# Patient Record
Sex: FEMALE | Race: WHITE | NOT HISPANIC OR LATINO | Employment: UNEMPLOYED | URBAN - METROPOLITAN AREA
[De-identification: names, ages, dates, MRNs, and addresses within clinical notes are randomized per-mention and may not be internally consistent; named-entity substitution may affect disease eponyms.]

---

## 2019-10-03 ENCOUNTER — TRANSFERRED RECORDS (OUTPATIENT)
Dept: HEALTH INFORMATION MANAGEMENT | Facility: CLINIC | Age: 29
End: 2019-10-03

## 2019-10-08 ENCOUNTER — TRANSFERRED RECORDS (OUTPATIENT)
Dept: HEALTH INFORMATION MANAGEMENT | Facility: CLINIC | Age: 29
End: 2019-10-08

## 2019-10-09 ENCOUNTER — TRANSFERRED RECORDS (OUTPATIENT)
Dept: HEALTH INFORMATION MANAGEMENT | Facility: CLINIC | Age: 29
End: 2019-10-09

## 2019-10-11 ENCOUNTER — TELEPHONE (OUTPATIENT)
Dept: DERMATOLOGY | Facility: CLINIC | Age: 29
End: 2019-10-11

## 2019-10-11 NOTE — TELEPHONE ENCOUNTER
[facility] Call Center    Phone Message    May a detailed message be left on voicemail: yes    Reason for Call: Other: Pt being referred by [facility] for Hidradenitis Suppurativa By Dr. Jared Ceron. Will fax over the referral No records attached with referral.      Action Taken: Message routed to:  [facility]

## 2019-10-22 NOTE — TELEPHONE ENCOUNTER
FUTURE VISIT INFORMATION      FUTURE VISIT INFORMATION:    Date: 10.25.19    Time: 8:00 am    Location:  Derm  REFERRAL INFORMATION:    Referring provider:  Dr. Jared Ceron    Referring providers clinic:  [facility]    Reason for visit/diagnosis:  new HS    RECORDS REQUESTED FROM:       Clinic name Comments Records Status Imaging Status   [facility] Requested                                       Action Deborah Khalil 10.22.19 3:45 pm   Action Taken Faxed records request to [facility]     Action Deborah Khalil 10.24.19 2:55 pm   Action Taken Called [facility] as records have not yet been received. They said they would fax records yet today. I let Dr. Mix know that records may not arrive in time.

## 2019-10-25 ENCOUNTER — OFFICE VISIT (OUTPATIENT)
Dept: DERMATOLOGY | Facility: CLINIC | Age: 29
End: 2019-10-25
Payer: COMMERCIAL

## 2019-10-25 ENCOUNTER — PRE VISIT (OUTPATIENT)
Dept: DERMATOLOGY | Facility: CLINIC | Age: 29
End: 2019-10-25

## 2019-10-25 VITALS — DIASTOLIC BLOOD PRESSURE: 82 MMHG | SYSTOLIC BLOOD PRESSURE: 139 MMHG | HEART RATE: 81 BPM

## 2019-10-25 DIAGNOSIS — L73.2 HIDRADENITIS SUPPURATIVA: Primary | ICD-10-CM

## 2019-10-25 DIAGNOSIS — L70.0 ACNE VULGARIS: ICD-10-CM

## 2019-10-25 RX ORDER — TRETINOIN 0.25 MG/G
CREAM TOPICAL AT BEDTIME
Qty: 45 G | Refills: 3 | Status: SHIPPED | OUTPATIENT
Start: 2019-10-25 | End: 2023-05-16

## 2019-10-25 RX ORDER — CLINDAMYCIN PHOSPHATE 10 UG/ML
LOTION TOPICAL 2 TIMES DAILY
Qty: 60 ML | Refills: 3 | Status: SHIPPED | OUTPATIENT
Start: 2019-10-25 | End: 2023-05-16

## 2019-10-25 RX ORDER — CLINDAMYCIN HCL 300 MG
300 CAPSULE ORAL 2 TIMES DAILY
Qty: 60 CAPSULE | Refills: 3 | Status: SHIPPED | OUTPATIENT
Start: 2019-10-25 | End: 2023-05-16

## 2019-10-25 ASSESSMENT — PAIN SCALES - GENERAL: PAINLEVEL: NO PAIN (0)

## 2019-10-25 NOTE — LETTER
10/25/2019       RE: Toma Bello  [address]  [place] [address]     Dear Colleague,    Thank you for referring your patient, Toma Bello, to the [facility] DERMATOLOGY at [facility]. Please see a copy of my visit note below.    [facility] Dermatology Note      Dermatology Problem List:  1. Urias stage I/II hidradenitis suppurativa - Urias II in the groin, Urias I elsewhere (abdominal panniculus, axillae)   - patient will not use contraceptives for Mandaen reasons   - start clindamycin topical, clindamycin 300 mg BID, chlorhexidine   - until becomes sexually active, can use tretinoin 0.025% cream, but must stop once becomes sexually active    Encounter Date: Oct 25, 2019    CC:  Chief Complaint   Patient presents with     Derm Problem     Toma is here today for consult for HS          History of Present Illness:  Ms. Toma Bello is a 28 year old female who presents in consultation from Dr. Ceron at [facility] for treatment evaluation her Hidradenitis Suppurativa (HS). Ms. Bello was diagnosed with HS in 2008. She has previously been on birth control; however, had multiple negative side effects. When she was in college at the [employer], she was on cephalexin, spironolactone, and zinc, which was helpful in controlling her symptoms and flares. She moved to [place] and was switched from cephalexin to minocycline. She has been adequately controlled on minocycline as well. Ms. Bello would like to know what other treatment options are available to her as she is getting  in April 2020 and is planning on getting pregnant soon. She will not use contraceptives for Mandaen reasons. She knows that she cannot be on minocycline when pregnant. She states that her HS is primarily in the genital area, although she has had minor flares in the axillae and breast previously. She has never had any cosmetic management of her HS in  the groin area. She would like to know what options are available to her for black head control prior to getting .     Past Medical History:   Patient Active Problem List   Diagnosis     Hidradenitis suppurativa     History reviewed. No pertinent past medical history.  History reviewed. No pertinent surgical history.    Social History:  Patient reports that she has never smoked. She has never used smokeless tobacco.    Family History:  History reviewed. No pertinent family history.    Medications:  Current Outpatient Medications   Medication Sig Dispense Refill     chlorhexidine (HIBICLENS) 4 % liquid Apply topically daily 473 mL 11     clindamycin (CLEOCIN T) 1 % external lotion Apply topically 2 times daily 60 mL 3     clindamycin (CLEOCIN) 300 MG capsule Take 1 capsule (300 mg) by mouth 2 times daily 60 capsule 3     tretinoin (RETIN-A) 0.025 % external cream Apply topically At Bedtime 45 g 3     methylPREDNISolone (MEDROL DOSEPAK) 4 MG tablet Follow package instructions (Patient not taking: Reported on 10/25/2019) 21 tablet 0     No Known Allergies      Review of Systems:  -As per HPI  -Constitutional: Otherwise feeling well today, in usual state of health.  -HEENT: Patient denies nonhealing oral sores.  -Skin: As above in HPI. No additional skin concerns.    Physical exam:  Vitals: /82 (BP Location: Right arm, Patient Position: Sitting, Cuff Size: Adult Regular)   Pulse 81   GEN: This is a well developed, well-nourished female in no acute distress, in a pleasant mood.    SKIN: Exam of the face, neck, chest, abdomen, back, buttocks, upper extremities, lower extremities, scalp, axillae, genitalia, and groin  -Boyd skin type: II  -3-4 firm, hyperpigmented erythematous papulonodules primarily on the labia majora/mons  -4-5 1 cm hyperpigmented scars on the lower abdomen  -double-headed comedones on the abdominal panniculus, axillae, groin  -No other lesions of concern on areas examined.      Impression/Plan:  1. Hidradenitis Suppurativa - Urias II in the groin, Urias I elsewhere (abdominal panniculus, axillae). Management must be adapted to lack of contraception, as most treatments are not safe to use during pregnancy. We spent quite a bit of time discussing numerous topical/intralesional, systemic, and surgical therapeutic options, including risks/benefits/alternatives to all of these various treatments.    Start oral clindamycin 300 mg BID    Apply clindamycin 1% external lotion BID.     Use topical chlorhexidine 4% liquid    Apply tretinoin 0.025% external cream for black heads. Told to stop using when sexually active and not on birth control    Referral to Dr. Sofía Alfred for consultation regarding laser hair removal for blackhead treatment.    Follow-up in 3 months, earlier for new or changing lesions.     Staff Involved:  I, Stacey Reynoso MS3, saw and examined the patient in the presence of Dr. Mix.    Staff Physician:  I was present with the medical student who participated in the service and in the documentation of the note. I have verified the history and personally performed the physical exam and medical decision making. I edited the assessment and plan of care as documented in the note.     Armando Mix MD   of Dermatology  Department of Dermatology  [facility] [employer]             Again, thank you for allowing me to participate in the care of your patient.      Sincerely,    Armando Mix MD

## 2019-10-25 NOTE — PROGRESS NOTES
[facility] Dermatology Note      Dermatology Problem List:  1. Urias stage I/II hidradenitis suppurativa - Urias II in the groin, Urias I elsewhere (abdominal panniculus, axillae)   - patient will not use contraceptives for Confucianism reasons   - start clindamycin topical, clindamycin 300 mg BID, chlorhexidine   - until becomes sexually active, can use tretinoin 0.025% cream, but must stop once becomes sexually active    Encounter Date: Oct 25, 2019    CC:  Chief Complaint   Patient presents with     Derm Problem     Toma is here today for consult for HS          History of Present Illness:  Ms. Toma Bello is a 28 year old female who presents in consultation from Dr. Ceron at [facility] for treatment evaluation her Hidradenitis Suppurativa (HS). Ms. Bello was diagnosed with HS in 2008. She has previously been on birth control; however, had multiple negative side effects. When she was in college at the [employer], she was on cephalexin, spironolactone, and zinc, which was helpful in controlling her symptoms and flares. She moved to [place] and was switched from cephalexin to minocycline. She has been adequately controlled on minocycline as well. Ms. Bello would like to know what other treatment options are available to her as she is getting  in April 2020 and is planning on getting pregnant soon. She will not use contraceptives for Confucianism reasons. She knows that she cannot be on minocycline when pregnant. She states that her HS is primarily in the genital area, although she has had minor flares in the axillae and breast previously. She has never had any cosmetic management of her HS in the groin area. She would like to know what options are available to her for black head control prior to getting .     Past Medical History:   Patient Active Problem List   Diagnosis     Hidradenitis suppurativa     History reviewed. No pertinent past medical  history.  History reviewed. No pertinent surgical history.    Social History:  Patient reports that she has never smoked. She has never used smokeless tobacco.    Family History:  History reviewed. No pertinent family history.    Medications:  Current Outpatient Medications   Medication Sig Dispense Refill     chlorhexidine (HIBICLENS) 4 % liquid Apply topically daily 473 mL 11     clindamycin (CLEOCIN T) 1 % external lotion Apply topically 2 times daily 60 mL 3     clindamycin (CLEOCIN) 300 MG capsule Take 1 capsule (300 mg) by mouth 2 times daily 60 capsule 3     tretinoin (RETIN-A) 0.025 % external cream Apply topically At Bedtime 45 g 3     methylPREDNISolone (MEDROL DOSEPAK) 4 MG tablet Follow package instructions (Patient not taking: Reported on 10/25/2019) 21 tablet 0     No Known Allergies      Review of Systems:  -As per HPI  -Constitutional: Otherwise feeling well today, in usual state of health.  -HEENT: Patient denies nonhealing oral sores.  -Skin: As above in HPI. No additional skin concerns.    Physical exam:  Vitals: /82 (BP Location: Right arm, Patient Position: Sitting, Cuff Size: Adult Regular)   Pulse 81   GEN: This is a well developed, well-nourished female in no acute distress, in a pleasant mood.    SKIN: Exam of the face, neck, chest, abdomen, back, buttocks, upper extremities, lower extremities, scalp, axillae, genitalia, and groin  -Boyd skin type: II  -3-4 firm, hyperpigmented erythematous papulonodules primarily on the labia majora/mons  -4-5 1 cm hyperpigmented scars on the lower abdomen  -double-headed comedones on the abdominal panniculus, axillae, groin  -No other lesions of concern on areas examined.     Impression/Plan:  1. Hidradenitis Suppurativa - Urias II in the groin, Urias I elsewhere (abdominal panniculus, axillae). Management must be adapted to lack of contraception, as most treatments are not safe to use during pregnancy. We spent quite a bit of time  discussing numerous topical/intralesional, systemic, and surgical therapeutic options, including risks/benefits/alternatives to all of these various treatments.    Start oral clindamycin 300 mg BID    Apply clindamycin 1% external lotion BID.     Use topical chlorhexidine 4% liquid    Apply tretinoin 0.025% external cream for black heads. Told to stop using when sexually active and not on birth control    Referral to Dr. Sofía Alfred for consultation regarding laser hair removal for blackhead treatment.    Follow-up in 3 months, earlier for new or changing lesions.     Staff Involved:  I, Stacey Reynoso MS3, saw and examined the patient in the presence of Dr. Mix.    Staff Physician:  I was present with the medical student who participated in the service and in the documentation of the note. I have verified the history and personally performed the physical exam and medical decision making. I edited the assessment and plan of care as documented in the note.     Armando Mix MD   of Dermatology  Department of Dermatology  [facility]

## 2019-10-25 NOTE — NURSING NOTE
Chief Complaint   Patient presents with     Derm Problem     Toma is here today for consult for HS      Kenia Vasquez LPN

## 2020-01-31 NOTE — LETTER
Date:October 28, 2019      Patient was self referred, no letter generated. Do not send.        [facility] Health Information       05-Feb-2020

## 2020-03-10 ENCOUNTER — HEALTH MAINTENANCE LETTER (OUTPATIENT)
Age: 30
End: 2020-03-10

## 2020-12-20 ENCOUNTER — HEALTH MAINTENANCE LETTER (OUTPATIENT)
Age: 30
End: 2020-12-20

## 2021-04-24 ENCOUNTER — HEALTH MAINTENANCE LETTER (OUTPATIENT)
Age: 31
End: 2021-04-24

## 2021-10-03 ENCOUNTER — HEALTH MAINTENANCE LETTER (OUTPATIENT)
Age: 31
End: 2021-10-03

## 2022-05-15 ENCOUNTER — HEALTH MAINTENANCE LETTER (OUTPATIENT)
Age: 32
End: 2022-05-15

## 2022-09-11 ENCOUNTER — HEALTH MAINTENANCE LETTER (OUTPATIENT)
Age: 32
End: 2022-09-11

## 2022-10-06 ENCOUNTER — TELEPHONE (OUTPATIENT)
Dept: ORTHOPEDICS | Facility: CLINIC | Age: 32
End: 2022-10-06

## 2022-10-06 ENCOUNTER — TRANSCRIBE ORDERS (OUTPATIENT)
Dept: OTHER | Age: 32
End: 2022-10-06

## 2022-10-06 DIAGNOSIS — M79.671 RIGHT FOOT PAIN: ICD-10-CM

## 2022-10-06 DIAGNOSIS — M72.2 PLANTAR FASCIAL FIBROMATOSIS OF RIGHT FOOT: Primary | ICD-10-CM

## 2022-10-06 NOTE — TELEPHONE ENCOUNTER
[facility] Call Center    Phone Message    May a detailed message be left on voicemail: yes     Reason for Call: Other: Pt is having referral may be under  name Francis . Referring from DR Nolan Giraldo for Dr Redding for soft tissue tumor in foot please review and let pt know when is avail      Action Taken: Other: ortho csc    Travel Screening: Not Applicable

## 2022-10-10 ENCOUNTER — TELEPHONE (OUTPATIENT)
Dept: ORTHOPEDICS | Facility: CLINIC | Age: 32
End: 2022-10-10

## 2022-10-10 NOTE — TELEPHONE ENCOUNTER
LVM for patient to schedule [facility] NEW TUMOR appointment with Vahid Skaggs or Luci, next available.  diagnosis is soft tissue tumor in foot.  Patient was provided with contact information and encouraged to call.

## 2022-10-13 NOTE — TELEPHONE ENCOUNTER
Action October 13, 2022 4:03 PM MT   Action Taken Sent a request for images from [facility] [phone].     Action October 14, 2022 4:32 PM MT   Action Taken Called [facility] [place] film room, rep: Eri says she will send the images as soon as she can.      Action October 17, 2022 8:09 AM MT   Action Taken Images received and resolved to PACS.      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

## 2022-10-17 ENCOUNTER — OFFICE VISIT (OUTPATIENT)
Dept: ORTHOPEDICS | Facility: CLINIC | Age: 32
End: 2022-10-17
Payer: COMMERCIAL

## 2022-10-17 ENCOUNTER — PRE VISIT (OUTPATIENT)
Dept: ORTHOPEDICS | Facility: CLINIC | Age: 32
End: 2022-10-17

## 2022-10-17 VITALS — BODY MASS INDEX: 36.08 KG/M2 | HEIGHT: 70 IN | WEIGHT: 252 LBS

## 2022-10-17 DIAGNOSIS — M72.2 PLANTAR FASCIAL FIBROMATOSIS OF RIGHT FOOT: ICD-10-CM

## 2022-10-17 DIAGNOSIS — M79.671 RIGHT FOOT PAIN: ICD-10-CM

## 2022-10-17 PROBLEM — I82.4Y2 ACUTE DEEP VEIN THROMBOSIS (DVT) OF PROXIMAL VEIN OF LEFT LOWER EXTREMITY (H): Status: ACTIVE | Noted: 2021-06-03

## 2022-10-17 PROCEDURE — 99204 OFFICE O/P NEW MOD 45 MIN: CPT | Performed by: ORTHOPAEDIC SURGERY

## 2022-10-17 RX ORDER — ACETAMINOPHEN 325 MG/1
325-650 TABLET ORAL
Status: ON HOLD | COMMUNITY
Start: 2021-04-21 | End: 2023-01-17

## 2022-10-17 RX ORDER — DOXYCYCLINE 50 MG/1
50 CAPSULE ORAL
COMMUNITY
Start: 2021-12-28 | End: 2023-05-16

## 2022-10-17 RX ORDER — IBUPROFEN 200 MG
200-600 TABLET ORAL EVERY 6 HOURS PRN
COMMUNITY
Start: 2021-04-21 | End: 2023-05-16

## 2022-10-17 RX ORDER — VIT C/HESPERIDIN/BIOFLAVONOIDS 500-100 MG
TABLET ORAL
COMMUNITY
Start: 2021-12-28 | End: 2023-05-16

## 2022-10-17 NOTE — NURSING NOTE
"Chief Complaint   Patient presents with     Consult     Consult for soft tissue tumor in right foot, Referred by Dr. El. Pt noticed a mass that was painful when pressed on about 8 months ago. Pt got an MRI that appeared like a \"nerve sheath tumor.\" Only treatment that was attempted was a topical gel that did not help.       31 year old  1990    Ht 1.79 m (5' 10.47\")   Wt 114.3 kg (252 lb)   BMI 35.68 kg/m      No past surgical history on file.        Pain Assessment  Patient Currently in Pain: Denies (Denies pain at time of evaluation)                   [id number] - [place], [place] - [address] AT [id number] - [facility] [address] AT [facility] & [address]        No Known Allergies        Current Outpatient Medications   Medication     acetaminophen (TYLENOL) 325 MG tablet     doxycycline monohydrate (MONODOX) 50 MG capsule     ibuprofen (ADVIL/MOTRIN) 200 MG tablet     Zinc 30 MG TABS     chlorhexidine (HIBICLENS) 4 % liquid     clindamycin (CLEOCIN T) 1 % external lotion     clindamycin (CLEOCIN) 300 MG capsule     methylPREDNISolone (MEDROL DOSEPAK) 4 MG tablet     tretinoin (RETIN-A) 0.025 % external cream     No current facility-administered medications for this visit.             Questionnaires:      "

## 2022-10-17 NOTE — PROGRESS NOTES
[facility], Orthopaedic Oncology Surgery Consultation  by Hunter Redding M.D.    Toma Blanco MRN# [id number]    YOB: 1990     Requesting physician: QING Garcia Dr, [facility]  No primary care provider on file.            Assessment and Plan:   Assessment:  1. 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    2. History of left lower extremity DVT postpartum    Plan:  Discussed management consisting of either observation or proceeding directly with surgical excision.  Likelihood of malignancy is small.  Recommended avoiding core needle biopsy procedure due to potential nerve injury at time of biopsy and the fact that surgical exposure of the tumor likely would not change the nature and extent of subsequent surgery, or risk of forefoot amputation, should this prove to be malignancy.    Patient expressed a desire to undergo surgical excision.  Timing will depend upon patient's personal and family needs as her infant son has a pending cardiac procedure.  She will contact us regarding timing of surgery.  Meanwhile we will place case request.  I discussed the risk benefits and alternatives of the surgical procedure, along with his anticipated recovery of limited weightbearing for 2 weeks postoperatively, with the patient and she has a good understanding.           History of Present Illness:   31 year old female  chief complaint    Patient is noticed a mass along the plantar surface of the right foot for several years.  Over the past year she believes it has been increasing in size and she has begun having more frequent symptoms of dysesthesias involving the second third toes.  With palpation, she notices tenderness and tingling or shock type feeling into her toes.    Current symptoms:  Problem: Soft tissue tumor in right foot  Onset and duration: Pt has had pain for a  couple of years. Noticed a mass about 8 months ago.  Awakens from sleep due to sx's:  No  Precipitating Injury:  No    Other joints or sites painful:  No  Fever: No  Appetite change or weight loss: No  History of prior or existing cancer: No         Physical Exam:     EXAMINATION pertinent findings:   PSYCH: Pleasant, healthy-appearing, alert, oriented x3, cooperative. Normal mood and affect.  VITAL SIGNS: There were no vitals taken for this visit..  Reviewed nursing intake notes.   There is no height or weight on file to calculate BMI.  RESP: non labored breathing   ABD: benign, soft, non-tender, no acute peritoneal findings  SKIN: grossly normal .  Patient denies any café au lait spots birthmarks other than solitary resolving 1.  LYMPHATIC: grossly normal, no adenopathy, no extremity edema  NEURO: grossly normal , no motor deficits  VASCULAR: satisfactory perfusion of all extremities   MUSCULOSKELETAL:   Her gait is normal.  Full range of motion of hips and knees.  Palpable mass along the plantar surface of the right foot with associated tenderness.  Positive Tinel's sign.  Left foot examination is normal           Data:   All laboratory data reviewed  All imaging studies reviewed by me      MR FOOT RIGHT WWO CONTRAST    Anatomical Region Laterality Modality   FOOT R -- Magnetic Resonance     Impression    1.  Enhancing 1.7 cm lesion which is within or displacing the distal plantar fascia. While a plantar fibroma could cause this appearance, the lesion does have certain imaging characteristics that suggest that it is deep to the plantar fascia, and therefore an etiology such as a neurogenic/nerve sheath tumor is favored. Recommend tissue diagnosis.  Narrative    For Patients: As a result of the 21st Century Cures Act, medical imaging exams and procedure reports are released immediately into your electronic medical record. You may view this report before your referring provider. If you have questions, please  contact your health care provider.     EXAM: MR FOOT RIGHT WWO   LOCATION: [facility] MEDICAL IMAGING   DATE/TIME: 8/29/2022 7:31 PM     INDICATION: Plantar fascial fibromatosis of right foot.   COMPARISON: 2/21/2022 radiograph.   TECHNIQUE: Routine. Additional postgadolinium T1 sequences were obtained.   IV CONTRAST: Gadobutrol 1 mmol/mL IV 10 mL vial: 10 mL     FINDINGS:   MUSCLES AND SOFT TISSUES:   -Within or displacing the distal plantar fascia of the forefoot superficial to the 3rd metatarsal shaft there is a T2 hyperintense, T1 hypointense enhancing lesion measuring 1.7 x 1.6 x 1.0 cm. No additional soft tissue masses. Muscle bulk is normal. No fluid collections.     JOINTS AND BONES:   -No fracture or bone contusion. Normal articular cartilage. No effusion.     TENDONS:   -No tendon tear, tendinopathy, or tenosynovitis.     LIGAMENTS:   -Lisfranc ligament: Intact. No subluxation.        DATA for DOCUMENTATION:         Past Medical History:     Patient Active Problem List   Diagnosis     Hidradenitis suppurativa     No past medical history on file.    Also see scanned health assessment forms.       Past Surgical History:   No past surgical history on file.         Social History:     Social History     Socioeconomic History     Marital status: Single     Spouse name: Not on file     Number of children: Not on file     Years of education: Not on file     Highest education level: Not on file   Occupational History     Not on file   Tobacco Use     Smoking status: Never     Smokeless tobacco: Never   Substance and Sexual Activity     Alcohol use: Not on file     Drug use: Not on file     Sexual activity: Not on file   Other Topics Concern     Not on file   Social History Narrative     Not on file     Social Determinants of Health     Financial Resource Strain: Not on file   Food Insecurity: Not on file   Transportation Needs: Not on file   Physical Activity: Not on file   Stress: Not on file   Social Connections: Not  on file   Intimate Partner Violence: Not on file   Housing Stability: Not on file            Family History:     No family history on file.         Medications:     Current Outpatient Medications   Medication Sig     chlorhexidine (HIBICLENS) 4 % liquid Apply topically daily     clindamycin (CLEOCIN T) 1 % external lotion Apply topically 2 times daily     clindamycin (CLEOCIN) 300 MG capsule Take 1 capsule (300 mg) by mouth 2 times daily     methylPREDNISolone (MEDROL DOSEPAK) 4 MG tablet Follow package instructions (Patient not taking: Reported on 10/25/2019)     tretinoin (RETIN-A) 0.025 % external cream Apply topically At Bedtime     No current facility-administered medications for this visit.              Review of Systems:   A comprehensive 10 point review of systems (constitutional, ENT, cardiac, peripheral vascular, lymphatic, respiratory, GI, , Musculoskeletal, skin, Neurological) was performed and found to be negative except as described in this note.     See intake form completed by patient

## 2022-10-17 NOTE — LETTER
10/17/2022         RE: Toma Blanco  [address] [place] [address]        Dear Colleague,    Thank you for referring your patient, Toma Blanco, to the [facility] [place]. Please see a copy of my visit note below.        [facility], Orthopaedic Oncology Surgery Consultation  by Hunter Redding M.D.    Toma Blanco MRN# [id number]    YOB: 1990     Requesting physician: QING Garcia Dr, [facility]  No primary care provider on file.            Assessment and Plan:   Assessment:  1. 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    2. History of left lower extremity DVT postpartum    Plan:  Discussed management consisting of either observation or proceeding directly with surgical excision.  Likelihood of malignancy is small.  Recommended avoiding core needle biopsy procedure due to potential nerve injury at time of biopsy and the fact that surgical exposure of the tumor likely would not change the nature and extent of subsequent surgery, or risk of forefoot amputation, should this prove to be malignancy.    Patient expressed a desire to undergo surgical excision.  Timing will depend upon patient's personal and family needs as her infant son has a pending cardiac procedure.  She will contact us regarding timing of surgery.  Meanwhile we will place case request.  I discussed the risk benefits and alternatives of the surgical procedure, along with his anticipated recovery of limited weightbearing for 2 weeks postoperatively, with the patient and she has a good understanding.           History of Present Illness:   31 year old female  chief complaint    Patient is noticed a mass along the plantar surface of the right foot for several years.  Over the past year she believes it has been increasing in size and she has begun  having more frequent symptoms of dysesthesias involving the second third toes.  With palpation, she notices tenderness and tingling or shock type feeling into her toes.    Current symptoms:  Problem: Soft tissue tumor in right foot  Onset and duration: Pt has had pain for a couple of years. Noticed a mass about 8 months ago.  Awakens from sleep due to sx's:  No  Precipitating Injury:  No    Other joints or sites painful:  No  Fever: No  Appetite change or weight loss: No  History of prior or existing cancer: No         Physical Exam:     EXAMINATION pertinent findings:   PSYCH: Pleasant, healthy-appearing, alert, oriented x3, cooperative. Normal mood and affect.  VITAL SIGNS: There were no vitals taken for this visit..  Reviewed nursing intake notes.   There is no height or weight on file to calculate BMI.  RESP: non labored breathing   ABD: benign, soft, non-tender, no acute peritoneal findings  SKIN: grossly normal .  Patient denies any café au lait spots birthmarks other than solitary resolving 1.  LYMPHATIC: grossly normal, no adenopathy, no extremity edema  NEURO: grossly normal , no motor deficits  VASCULAR: satisfactory perfusion of all extremities   MUSCULOSKELETAL:   Her gait is normal.  Full range of motion of hips and knees.  Palpable mass along the plantar surface of the right foot with associated tenderness.  Positive Tinel's sign.  Left foot examination is normal           Data:   All laboratory data reviewed  All imaging studies reviewed by me      MR FOOT RIGHT WWO CONTRAST    Anatomical Region Laterality Modality   FOOT R -- Magnetic Resonance     Impression    1.  Enhancing 1.7 cm lesion which is within or displacing the distal plantar fascia. While a plantar fibroma could cause this appearance, the lesion does have certain imaging characteristics that suggest that it is deep to the plantar fascia, and therefore an etiology such as a neurogenic/nerve sheath tumor is favored. Recommend tissue  diagnosis.  Narrative    For Patients: As a result of the 21st Century Cures Act, medical imaging exams and procedure reports are released immediately into your electronic medical record. You may view this report before your referring provider. If you have questions, please contact your health care provider.     EXAM: MR FOOT RIGHT WWO   LOCATION: [facility] MEDICAL IMAGING   DATE/TIME: 8/29/2022 7:31 PM     INDICATION: Plantar fascial fibromatosis of right foot.   COMPARISON: 2/21/2022 radiograph.   TECHNIQUE: Routine. Additional postgadolinium T1 sequences were obtained.   IV CONTRAST: Gadobutrol 1 mmol/mL IV 10 mL vial: 10 mL     FINDINGS:   MUSCLES AND SOFT TISSUES:   -Within or displacing the distal plantar fascia of the forefoot superficial to the 3rd metatarsal shaft there is a T2 hyperintense, T1 hypointense enhancing lesion measuring 1.7 x 1.6 x 1.0 cm. No additional soft tissue masses. Muscle bulk is normal. No fluid collections.     JOINTS AND BONES:   -No fracture or bone contusion. Normal articular cartilage. No effusion.     TENDONS:   -No tendon tear, tendinopathy, or tenosynovitis.     LIGAMENTS:   -Lisfranc ligament: Intact. No subluxation.        DATA for DOCUMENTATION:         Past Medical History:     Patient Active Problem List   Diagnosis     Hidradenitis suppurativa     No past medical history on file.    Also see scanned health assessment forms.       Past Surgical History:   No past surgical history on file.         Social History:     Social History     Socioeconomic History     Marital status: Single     Spouse name: Not on file     Number of children: Not on file     Years of education: Not on file     Highest education level: Not on file   Occupational History     Not on file   Tobacco Use     Smoking status: Never     Smokeless tobacco: Never   Substance and Sexual Activity     Alcohol use: Not on file     Drug use: Not on file     Sexual activity: Not on file   Other Topics Concern     Not on  file   Social History Narrative     Not on file     Social Determinants of Health     Financial Resource Strain: Not on file   Food Insecurity: Not on file   Transportation Needs: Not on file   Physical Activity: Not on file   Stress: Not on file   Social Connections: Not on file   Intimate Partner Violence: Not on file   Housing Stability: Not on file            Family History:     No family history on file.         Medications:     Current Outpatient Medications   Medication Sig     chlorhexidine (HIBICLENS) 4 % liquid Apply topically daily     clindamycin (CLEOCIN T) 1 % external lotion Apply topically 2 times daily     clindamycin (CLEOCIN) 300 MG capsule Take 1 capsule (300 mg) by mouth 2 times daily     methylPREDNISolone (MEDROL DOSEPAK) 4 MG tablet Follow package instructions (Patient not taking: Reported on 10/25/2019)     tretinoin (RETIN-A) 0.025 % external cream Apply topically At Bedtime     No current facility-administered medications for this visit.              Review of Systems:   A comprehensive 10 point review of systems (constitutional, ENT, cardiac, peripheral vascular, lymphatic, respiratory, GI, , Musculoskeletal, skin, Neurological) was performed and found to be negative except as described in this note.     See intake form completed by patient        Hunter Redding MD

## 2022-10-25 ENCOUNTER — TELEPHONE (OUTPATIENT)
Dept: ORTHOPEDICS | Facility: CLINIC | Age: 32
End: 2022-10-25

## 2022-10-25 NOTE — TELEPHONE ENCOUNTER
-Spoke to the patient over the phone and performed pre-op teaching.    Teaching Flowsheet   Relevant Diagnosis: Pre-Op TeachingTeaching Topic: Excision and neuroplasty of R plantar foot mass         Person(s) involved in teaching:   Patient     Motivation Level:  Asks Questions: Yes  Eager to Learn: Yes  Cooperative: Yes  Receptive (willing/able to accept information): Yes  Any cultural factors/Confucianism beliefs that may influence understanding or compliance? No  Comments:      Patient demonstrates understanding of the following:  Reason for the appointment, diagnosis and treatment plan: Yes  Knowledge of proper use of medications and conditions for which they are ordered (with special attention to potential side effects or drug interactions): Yes  Which situations necessitate calling provider and whom to contact: Yes  What has the patient tried?    Has your symptoms improved?       Teaching Concerns Addressed:   Comments:      Proper use and care of surgical scrub.  (Which the patient received at her previous clinic visit).  (medical equip, care aids, etc.): Yes  Nutritional needs and diet plan: Yes  Pain management techniques: Yes  Wound Care: Yes  How and/when to access community resources: Yes     Instructional Materials Used/Given: Patient also notes that she received a surgery packet at her last clinic visit as well.    In addition to the information above, the following was also discussed:    -important contact info/ phone numbers  -map/ location of surgery  -medications to avoid  -showering instructions  -stoplight tool  -pre-op needed within 30 days  -Covid test: make take a test at home, 1-2 days before surgery.  If negative, bring in a photo of the negative result.  If positive, call our office.  -patient lists her , James, as the person to be driving her home and staying with her after surgery.    -Next step: Luke to contact the patient to schedule a surgery date.     Aurora Vanegas  RN  10/25/2022 9:58 AM        Time spent with patient: 15 minutes.

## 2022-11-17 ENCOUNTER — TELEPHONE (OUTPATIENT)
Dept: ORTHOPEDICS | Facility: CLINIC | Age: 32
End: 2022-11-17

## 2022-11-17 NOTE — TELEPHONE ENCOUNTER
Called patient to schedule surgery with Dr. Redding    Date of Surgery: 12/20/22    Location of surgery: [facility] OR    Pre-Op H&P: 12/06/22    Pre/Post Imaging:  No    Discussed COVID-19 Testing: Yes    Post-Op Appt Date: TBD-No info in case req    Surgery Packet Mailed: Given in clinic      Additional comments: Scheduled pt via my chart. Kyle PAC and routing to RN to look into POP as there is no mention in case request        Chica Peterson on 11/17/2022 at 9:16 AM

## 2022-11-18 NOTE — TELEPHONE ENCOUNTER
FUTURE VISIT INFORMATION        SURGERY INFORMATION:    Date: 12/20/22    [facility]    Surgeon:  Hunter Redding MD    Anesthesia Type:  choice    Procedure: Excision and neuroplasty of Right plantar foot mass    Consult: ov 10/17     RECORDS REQUESTED FROM:            Pertinent Medical History:  DVT

## 2022-12-06 ENCOUNTER — PRE VISIT (OUTPATIENT)
Dept: SURGERY | Facility: CLINIC | Age: 32
End: 2022-12-06

## 2022-12-06 ENCOUNTER — LAB (OUTPATIENT)
Dept: LAB | Facility: CLINIC | Age: 32
End: 2022-12-06
Payer: COMMERCIAL

## 2022-12-06 ENCOUNTER — OFFICE VISIT (OUTPATIENT)
Dept: SURGERY | Facility: CLINIC | Age: 32
End: 2022-12-06
Payer: COMMERCIAL

## 2022-12-06 ENCOUNTER — ANESTHESIA EVENT (OUTPATIENT)
Dept: SURGERY | Facility: CLINIC | Age: 32
End: 2022-12-06
Payer: COMMERCIAL

## 2022-12-06 VITALS
BODY MASS INDEX: 35.31 KG/M2 | DIASTOLIC BLOOD PRESSURE: 70 MMHG | OXYGEN SATURATION: 99 % | RESPIRATION RATE: 16 BRPM | TEMPERATURE: 97.5 F | HEIGHT: 71 IN | SYSTOLIC BLOOD PRESSURE: 110 MMHG | HEART RATE: 64 BPM | WEIGHT: 252.2 LBS

## 2022-12-06 DIAGNOSIS — Z01.818 PREOP EXAMINATION: ICD-10-CM

## 2022-12-06 DIAGNOSIS — M79.671 RIGHT FOOT PAIN: ICD-10-CM

## 2022-12-06 DIAGNOSIS — Z01.818 PREOP EXAMINATION: Primary | ICD-10-CM

## 2022-12-06 LAB
ANION GAP SERPL CALCULATED.3IONS-SCNC: 11 MMOL/L (ref 7–15)
BUN SERPL-MCNC: 9.5 MG/DL (ref 6–20)
CALCIUM SERPL-MCNC: 9.1 MG/DL (ref 8.6–10)
CHLORIDE SERPL-SCNC: 104 MMOL/L (ref 98–107)
CREAT SERPL-MCNC: 0.72 MG/DL (ref 0.51–0.95)
DEPRECATED HCO3 PLAS-SCNC: 24 MMOL/L (ref 22–29)
ERYTHROCYTE [DISTWIDTH] IN BLOOD BY AUTOMATED COUNT: 13 % (ref 10–15)
GFR SERPL CREATININE-BSD FRML MDRD: >90 ML/MIN/1.73M2
GLUCOSE SERPL-MCNC: 94 MG/DL (ref 70–99)
HCT VFR BLD AUTO: 41 % (ref 35–47)
HGB BLD-MCNC: 13.4 G/DL (ref 11.7–15.7)
MCH RBC QN AUTO: 27.7 PG (ref 26.5–33)
MCHC RBC AUTO-ENTMCNC: 32.7 G/DL (ref 31.5–36.5)
MCV RBC AUTO: 85 FL (ref 78–100)
PLATELET # BLD AUTO: 321 10E3/UL (ref 150–450)
POTASSIUM SERPL-SCNC: 4.6 MMOL/L (ref 3.4–5.3)
RBC # BLD AUTO: 4.83 10E6/UL (ref 3.8–5.2)
SODIUM SERPL-SCNC: 139 MMOL/L (ref 136–145)
WBC # BLD AUTO: 8.8 10E3/UL (ref 4–11)

## 2022-12-06 PROCEDURE — 36415 COLL VENOUS BLD VENIPUNCTURE: CPT | Performed by: PATHOLOGY

## 2022-12-06 PROCEDURE — 85027 COMPLETE CBC AUTOMATED: CPT | Performed by: PATHOLOGY

## 2022-12-06 PROCEDURE — 99204 OFFICE O/P NEW MOD 45 MIN: CPT | Performed by: CLINICAL NURSE SPECIALIST

## 2022-12-06 PROCEDURE — 80048 BASIC METABOLIC PNL TOTAL CA: CPT | Performed by: CLINICAL NURSE SPECIALIST

## 2022-12-06 ASSESSMENT — PAIN SCALES - GENERAL: PAINLEVEL: NO PAIN (0)

## 2022-12-06 ASSESSMENT — LIFESTYLE VARIABLES: TOBACCO_USE: 0

## 2022-12-06 ASSESSMENT — ENCOUNTER SYMPTOMS: SEIZURES: 0

## 2022-12-06 NOTE — H&P
Pre-Operative H & P     CC:  Preoperative exam to assess for increased cardiopulmonary risk while undergoing surgery and anesthesia.    Date of Encounter: 12/6/2022  Primary Care Physician:  No primary care provider on file.     Reason for visit:   Encounter Diagnoses   Name Primary?     Preop examination Yes     Right foot pain        HPI  Toma Blanco is a 31 year old female who presents for pre-operative H & P in preparation for  Procedure Information     Case: [id number] Date/Time: 12/20/22 1300    Procedure: Excision and neuroplasty of Right plantar foot mass (Right: Foot)    Anesthesia type: Choice    Diagnosis: Right foot pain [M79.671]    Pre-op diagnosis: Right foot pain [M79.671]    [facility]    Providers: Hunter Redding MD          History is obtained from the patient and chart review    Patient who has been recently followed by Dr. Redding for a mass along the plantar surface of the right foot for several years. This area has been increasing in size and she has begun having more frequent symptoms of dysesthesias involving the second third toes. She also has tenderness and tingling or shock type feelings into her toes with palpation. Her imaging was reviewed and she was counseled for above procedures.     Her history is otherwise significant for a kidney stone which she passed in 7/2022 with no recurrence. She has hidradenitis suppurativa, well managed. She developed an acute DVT of her left lower extremity post partum in 5/30/2021 and was anticoagulated for a time. No recurrence and she is no longer on anticoagulation. She does have family history of PE in a paternal uncle and DVT in her maternal grandmother. She was evaluated by Hematology and her work up was negative for inherited or other factors.       Hx of abnormal bleeding or anti-platelet use: Denies.     Menstrual history: Patient's last menstrual period was 11/27/2022 (exact date).     Past Medical History  Past Medical  History:   Diagnosis Date     Acute deep vein thrombosis (DVT) of proximal vein of left lower extremity (H)     post partum     Anxiety      Hidradenitis suppurativa      Infection due to 2019 novel coronavirus      Nephrolithiasis      Plantar fascial fibromatosis of right foot        Past Surgical History  Past Surgical History:   Procedure Laterality Date     [place] teeth extraction         Prior to Admission Medications  Current Outpatient Medications   Medication Sig Dispense Refill     acetaminophen (TYLENOL) 325 MG tablet Take 325-650 mg by mouth       ibuprofen (ADVIL/MOTRIN) 200 MG tablet Take 200-600 mg by mouth       chlorhexidine (HIBICLENS) 4 % liquid Apply topically daily (Patient not taking: Reported on 12/6/2022) 473 mL 11     clindamycin (CLEOCIN T) 1 % external lotion Apply topically 2 times daily (Patient not taking: Reported on 10/17/2022) 60 mL 3     clindamycin (CLEOCIN) 300 MG capsule Take 1 capsule (300 mg) by mouth 2 times daily (Patient not taking: Reported on 10/17/2022) 60 capsule 3     doxycycline monohydrate (MONODOX) 50 MG capsule Take 50 mg by mouth (Patient not taking: Reported on 12/6/2022)       methylPREDNISolone (MEDROL DOSEPAK) 4 MG tablet Follow package instructions (Patient not taking: Reported on 10/25/2019) 21 tablet 0     tretinoin (RETIN-A) 0.025 % external cream Apply topically At Bedtime (Patient not taking: Reported on 10/17/2022) 45 g 3     Zinc 30 MG TABS  (Patient not taking: Reported on 12/6/2022)         Allergies  No Known Allergies    Social History  Social History     Socioeconomic History     Marital status: Single     Spouse name: Not on file     Number of children: Not on file     Years of education: Not on file     Highest education level: Not on file   Occupational History     Not on file   Tobacco Use     Smoking status: Never     Smokeless tobacco: Never   Substance and Sexual Activity     Alcohol use: Yes     Alcohol/week: 1.0 - 2.0 standard drink      Types: 1 - 2 Standard drinks or equivalent per week     Comment: once weekly     Drug use: Never     Sexual activity: Not on file   Other Topics Concern     Not on file   Social History Narrative     Not on file     Social Determinants of Health     Financial Resource Strain: Not on file   Food Insecurity: Not on file   Transportation Needs: Not on file   Physical Activity: Not on file   Stress: Not on file   Social Connections: Not on file   Intimate Partner Violence: Not on file   Housing Stability: Not on file       Family History  Family History   Problem Relation Age of Onset     Diabetes Father      Kidney Disease Father      Post Operative Nausea and Vomiting Sister      Diabetes Brother      Mental Illness Brother      Deep Vein Thrombosis (DVT) Maternal Grandmother      Dementia Maternal Grandfather      Heart Disease Maternal Grandfather      Dementia Paternal Grandmother      Diabetes Paternal Grandfather      Heart Failure Paternal Grandfather       Birth Son      Pulmonary Embolism Paternal Uncle        Review of Systems  The complete review of systems is negative other than noted in the HPI or here.   Anesthesia Evaluation   Pt has had prior anesthetic. Type: MAC and Regional.    No history of anesthetic complications       ROS/MED HX  ENT/Pulmonary:     (+) DIMAS risk factors, obese,  (-) tobacco use and recent URI   Neurologic:  - neg neurologic ROS  (-) no seizures and no CVA   Cardiovascular: Comment: /70    Occ palpitations    (+) -----Irregular Heartbeat/Palpitations, No previous cardiac testing  (-) taking anticoagulants/antiplatelets and LAMBERT   METS/Exercise Tolerance: >4 METS    Hematologic:     (+) History of blood clots, pt is not anticoagulated,  (-) history of blood transfusion   Musculoskeletal: Comment: Foot pain      GI/Hepatic:  - neg GI/hepatic ROS  (-) GERD   Renal/Genitourinary:     (+) Nephrolithiasis ,     Endo:     (+) Obesity,     Psychiatric/Substance Use:  - neg  "psychiatric ROS  (-) psychiatric history   Infectious Disease:  - neg infectious disease ROS     Malignancy:  - neg malignancy ROS     Other: Comment: Hidradenitis suppurativa     (+) LMP: 11/27/22, ,         /70 (BP Location: Right arm, Patient Position: Sitting, Cuff Size: Adult Large)   Pulse 64   Temp 97.5  F (36.4  C) (Oral)   Resp 16   Ht 1.791 m (5' 10.5\")   Wt 114.4 kg (252 lb 3.2 oz)   LMP 11/27/2022 (Exact Date)   SpO2 99%   Breastfeeding No   BMI 35.68 kg/m      Physical Exam   Constitutional: Awake, alert, cooperative, no apparent distress, and appears stated age.   Eyes: Pupils equal, round and reactive to light, extra ocular muscles intact, sclera clear, conjunctiva normal.  HENT: Normocephalic, oral pharynx with moist mucus membranes, good dentition. No goiter appreciated.   Respiratory: Clear to auscultation bilaterally, no crackles or wheezing. No cough or obvious dyspnea.  Cardiovascular: Regular rate and rhythm, normal S1 and S2, and no murmur noted.  Carotids +2, no bruits. No edema. Bilateral calves soft and nontender to palpation. Palpable pulses to radial  DP and PT arteries.   GI: Normal bowel sounds, soft, non-distended, non-tender, no masses palpated, no hepatosplenomegaly.   Lymph/Hematologic: No cervical lymphadenopathy and no supraclavicular lymphadenopathy.  Genitourinary: Deferred.   Skin: Warm and dry.   Musculoskeletal: Full ROM of neck. There is no redness, warmth, or swelling of the joints. Gross motor strength is normal.    Neurologic: Awake, alert, oriented to name, place and time. Cranial nerves II-XII are grossly intact. Gait is normal.   Neuropsychiatric: Calm, cooperative. Normal affect.     Prior Labs/Diagnostic Studies   All labs and imaging personally reviewed     EKG: Not indicated    US Venous lower extremity 11/5/22     No deep venous thrombosis in the left lower extremity.    MR right foot 8/29/22    Enhancing 1.7 cm lesion which is within or displacing " the distal plantar fascia. While a plantar fibroma could cause this appearance, the lesion does have certain imaging characteristics that suggest that it is deep to the plantar fascia, and therefore an etiology such as a neurogenic/nerve sheath tumor is favored. Recommend tissue diagnosis.    6/22/22 CT abd/pelvis    1.  Moderate right hydroureteronephrosis with an obstructing 5 mm distal ureteral calculus, just proximal to the ureterovesical junction.    2.  Small nonobstructive left renal calculus. No left hydronephrosis.    3.  A 2.6 cm probable right ovarian cyst can be further evaluated by nonemergent transabdominal and transvaginal pelvic ultrasound.      The patient's records and results personally reviewed by this provider.     Outside records reviewed from: Care Everywhere    LAB/DIAGNOSTIC STUDIES TODAY:   Lab Results   Component Value Date    WBC 8.8 12/06/2022    WBC 8.9 12/06/2014     Lab Results   Component Value Date    RBC 4.83 12/06/2022    RBC 4.52 12/06/2014     Lab Results   Component Value Date    HGB 13.4 12/06/2022    HGB 12.7 12/06/2014     Lab Results   Component Value Date    HCT 41.0 12/06/2022    HCT 39.1 12/06/2014     Lab Results   Component Value Date    MCV 85 12/06/2022    MCV 87 12/06/2014     Lab Results   Component Value Date    MCH 27.7 12/06/2022    MCH 28.1 12/06/2014     Lab Results   Component Value Date    MCHC 32.7 12/06/2022    MCHC 32.5 12/06/2014     Lab Results   Component Value Date    RDW 13.0 12/06/2022    RDW 12.9 12/06/2014     Lab Results   Component Value Date     12/06/2022     12/06/2014     Last Comprehensive Metabolic Panel:  Sodium   Date Value Ref Range Status   12/06/2022 139 136 - 145 mmol/L Final     Potassium   Date Value Ref Range Status   12/06/2022 4.6 3.4 - 5.3 mmol/L Final     Chloride   Date Value Ref Range Status   12/06/2022 104 98 - 107 mmol/L Final     Carbon Dioxide (CO2)   Date Value Ref Range Status   12/06/2022 24 22 - 29  mmol/L Final     Anion Gap   Date Value Ref Range Status   12/06/2022 11 7 - 15 mmol/L Final     Glucose   Date Value Ref Range Status   12/06/2022 94 70 - 99 mg/dL Final     Urea Nitrogen   Date Value Ref Range Status   12/06/2022 9.5 6.0 - 20.0 mg/dL Final     Creatinine   Date Value Ref Range Status   12/06/2022 0.72 0.51 - 0.95 mg/dL Final     GFR Estimate   Date Value Ref Range Status   12/06/2022 >90 >60 mL/min/1.73m2 Final     Comment:     Effective December 21, 2021 eGFRcr in adults is calculated using the 2021 CKD-EPI creatinine equation which includes age and gender (Abhi et al., NEJM, DOI: 10.1056/SCOOef5016557)     Calcium   Date Value Ref Range Status   12/06/2022 9.1 8.6 - 10.0 mg/dL Final       Assessment      Toma Blanco is a 31 year old female seen as a PAC referral for risk assessment and optimization for anesthesia.    Plan/Recommendations  Pt will be optimized for the proposed procedure.  See below for details on the assessment, risk, and preoperative recommendations    NEUROLOGY  - No history of TIA, CVA or seizure    -Post Op delirium risk factors:  No risk identified    ENT  - No current airway concerns.  Will need to be reassessed day of surgery.  Mallampati: I  TM: > 3    CARDIAC  No cardiac history, symptoms or meds. Good exercise tolerance. /70  - METS (Metabolic Equivalents)>4    RCRI: 0.4% risk of serious cardiac events    PULMONARY  Denies asthma, cough or shortness of breath.     Low risk for DIMAS  - Tobacco History      History   Smoking Status     Never   Smokeless Tobacco     Never       GI: Denies GERD  PONV Medium Risk  Total Score: 2           1 AN PONV: Pt is Female    1 AN PONV: Patient is not a current smoker      Family history of PONV in sister.    /RENAL  - Baseline Creatinine  Cr 0.72  - History of kidney stone passed 7/2022. No recurrence.    ENDOCRINE    - BMI: Estimated body mass index is 35.68 kg/m  as calculated from the following:    Height as of this  "encounter: 1.791 m (5' 10.5\").    Weight as of this encounter: 114.4 kg (252 lb 3.2 oz).  Obesity (BMI >30)  - No history of Diabetes Mellitus Random BS 94    HEME VTE risk 3%  Personal and family history of blood clots  Patient had provoked DVT left calf (posterior tibial and peroneal vein) DVT on 5/30/21. Anticoagulated for a time. No recurrence. Heme evaluation negative.   Patient voices concern for post op DVT since she will have to be immobile for 3 weeks after surgery. Encouraged her to contact her Hematologist for recommendations and to inform Dr. Redding of these recommendations. After discussion with anesthesia also informed that a baby aspirin could be considered post op. She will finalize with her Hematologist.     Denies history of blood transfusion     MSK: Right foot pain.  Patient is not frail    SKIN: Hydradenitis suppurativa in groin area. Controlled with prn meds.     Different anesthesia methods/types have been discussed with the patient, but they are aware that the final plan will be decided by the assigned anesthesia provider on the date of service.    Patient was discussed with Dr Peña. Message also to Dr. Redding informing of patient's concern for post op DVT, and her plan to discuss with her Hematologist.     The patient is optimized for their procedure. AVS with information on surgery time/arrival time, meds and NPO status given by nursing staff. No further diagnostic testing indicated.      On the day of service:     Prep time: 12 minutes  Visit time: 16 minutes  Documentation time: 23 minutes  ------------------------------------------  Total time: 51 minutes      ROSITA Brownlee CNS  Preoperative Assessment Center  [facility]  Clinic and Surgery Center  Phone: [phone]  Fax: [phone]  "

## 2022-12-06 NOTE — PATIENT INSTRUCTIONS
Preparing for Your Surgery      Name:  Toma Blanco   MRN:  [id number]   :  1990   Today's Date:  2022       Arriving for surgery:  Surgery date:  22  Arrival time:  10:30 am     Surgeries and procedures: Adult patients can have 2 visitors all through the surgery process.     Visiting hours: 8 a.m. to 8:30 p.m.     Hospital: Adult patients and children under age 18 can have 4 visitor at a time     No visitors under the age of 5 are allowed for hospital patients.  Double occupancy rooms: Patients can have only two visitors at a time.     Patients with disabilities: Can have a support person with them (family member, service provider     Or someone well informed about their needs) plus the allowed number of visitors     Patients confirmed or suspected to have symptoms of COVID 19 or flu:     No visitors allowed for adult patients.   Children (under age 18) can have 1 named visitor.     People who are sick or showing symptoms of COVID 19 or flu:    Are not allowed to visit patients--we can only make exceptions in special situations.       Please follow these guidelines for your visit:   Arrive wearing a mask over your mouth and nose; we will give you a medical mask to wear    If you arrive wearing a cloth mask.   Keep it on during your entire visit, even when in patient's room.   If you don't wear a mask we'll ask you to leave.     Clean your hands with alcohol hand . Do this when you arrive at and leave the building and patient room,    And again after you touch your mask or anything in the room.     You can t visit if you have a fever, cough, shortness of breath, muscle aches, headaches, sore throat    Or diarrhea      Stay 6 feet away from others during your visit and between visits     Go directly to and from the room you are visiting.     Stay in the patient s room during your visit. Limit going to other places in the hospital as much as possible     Leave bags and jackets at home  or in the car.     For everyone s health, please don t come and go during your visit. That includes for smoking   during your visit.     Please come to:     [facility] [address]  [address] [place]  [address]    -Parking is available in the Green Lot.    -Proceed to the 3rd floor, check in at the Adult Surgery Waiting Lounge. [phone]    If an escort is needed stop at the Information Desk in the lobby. Inform the information person that you are here for surgery. An escort to the Adult Surgery Waiting Lounge will be provided.    What can I eat or drink?  -  You may eat and drink normally up to 8 hours prior to arrival time. (Until 2:30 am)  -  You may have clear liquids until 2 hours prior to arrival time. (Until 8:30 am)    Examples of clear liquids:  Water  Clear broth  Juices (apple, white grape, white cranberry  and cider) without pulp  Noncarbonated, powder based beverages  (lemonade and Riaz-Aid)  Sodas (Sprite, 7-Up, ginger ale and seltzer)  Coffee or tea (without milk or cream)  Gatorade    -  No Alcohol for at least 24 hours before surgery.     Which medicines can I take?  Hold Aspirin for 7 days before surgery.   Hold Multivitamins for 7 days before surgery.  Hold Supplements for 7 days before surgery.  Hold Ibuprofen (Advil, Motrin) for 1 day before surgery--unless otherwise directed by surgeon.  Hold Naproxen (Aleve) for 4 days before surgery.    -  PLEASE TAKE these medications the day of surgery:  Acetaminophen (Tylenol) if needed    How do I prepare myself?  - Please take 2 showers before surgery using Scrubcare or Hibiclens soap.    Use this soap only from the neck to your toes.     Leave the soap on your skin for one minute--then rinse thoroughly.      You may use your own shampoo and conditioner. No other hair products.   - Please remove all jewelry and body piercings.  - No lotions, deodorants or fragrance.  - No makeup or  fingernail polish.   - Bring your ID and insurance card.    -If you have a Deep Brain Stimulator, Spinal Cord Stimulator, or any Neuro Stimulator device---you must bring the remote control to the hospital.      ALL PATIENTS GOING HOME THE SAME DAY OF SURGERY ARE REQUIRED TO HAVE A RESPONSIBLE ADULT TO DRIVE AND BE IN ATTENDANCE WITH THEM FOR 24 HOURS FOLLOWING SURGERY.    Covid testing policy as of 12/06/2022  Your surgeon will notify and schedule you for a COVID test if one is needed before surgery--please direct any questions or COVID symptoms to your surgeon      Questions or Concerns:    - For any questions regarding the day of surgery or your hospital stay, please contact the Pre Admission Nursing Office at [phone].       - If you have health changes between today and your surgery, please call your surgeon.       - For questions after surgery, please call your surgeons office.

## 2022-12-13 ENCOUNTER — PREP FOR PROCEDURE (OUTPATIENT)
Dept: OTHER | Facility: CLINIC | Age: 32
End: 2022-12-13

## 2022-12-13 NOTE — PROGRESS NOTES
SURGERY PLAN (PRE-OP PLAN)    Patient Position (indicated by x):    Supine     Supine with torso rolled up on a bump     Floppy lateral on torso length bean bag     Lateral decubitus, bean bag, full length     Lateral decubitus, Wixson hip positioner     Safety paddle side supports x 2 clamped to side rail     Lithotomy, both legs in yellow padded leg garcia     Lithotomy, single leg in yellow padded leg garcia   x  Prone on blanket rolls/round gel pad     Prone on Jah (arched) frame on Ankit table     Single thigh in orange arthroscopy clamp     Beach chair semi recumbent     Spider limb positioner     Arm out on radiolucent arm table     Split drape with top bar     Revision NING drape with plastic side bags for leg   x  Extremity drape     Shoulder pack drape     Laparotomy drape     Vogel catheter          General Equipment Requests (indicated by x):      C-Arm with C-Armor drape     C-Arm (video capable, AOT Bedding [employer]00 model)     O-Arm with Stealth imaging     Fracture Table     Ankit XR Table     SurgiGraphic 6000 (diving board) fluoro table     Cell saver     Redding Biopsy trephine set w/ K-wire & pituitary rongeurs   x  Small pituitary rongeur   x  Vahid's angled curettes, narrow shaft     Bone graft, kapner gouges     Midas Suman Medtronic ilda, electric motor     Phenol 5%     Browning BMAC stem cell     Vancomycin 1 gram powder     Zometa 4 mg vials     Depo Medrol steroid     Blunt Pelvic Retractor (.55, Blunt Hohmann with  slight bend)     (1) Portable hand held radiation detector machine for sentinel node biopsy and (2) Lymphazurin     Lambotte Osteotomes     Specimens and cultures (indicated by x):     Tissue cultures, aerobic and anaerobic without gram stain     Frozen section   x  pathology specimens - fresh   x  pathology specimens - formalin         Umu Holt MD  Adult Reconstructive Surgery Fellow  Department of Orthopaedic Surgery, [facility]

## 2022-12-16 ENCOUNTER — MYC MEDICAL ADVICE (OUTPATIENT)
Dept: ORTHOPEDICS | Facility: CLINIC | Age: 32
End: 2022-12-16

## 2022-12-29 NOTE — TELEPHONE ENCOUNTER
Rescheduled  Patient is scheduled for surgery with Dr. Redding    Spoke with: Toma    Date of Surgery: 1/17/23    Location: [facility] OR    Informed patient they will need an adult  Yes    Pre op with Provider PAC    Pre-procedure COVID-19 Test: N/A    Additional imaging/appointments: POP Made     Additional comments: N/A

## 2022-12-29 NOTE — TELEPHONE ENCOUNTER
FUTURE VISIT INFORMATION      SURGERY INFORMATION:    Date: 1/17/23    [facility]    Surgeon:  Hunter Redding MD    Anesthesia Type:  choice    Procedure: Excision and neuroplasty of Right plantar foot mass    RECORDS REQUESTED FROM:       Primary Care Provider: Kristal    Pertinent Medical History: dvt

## 2023-01-05 ENCOUNTER — VIRTUAL VISIT (OUTPATIENT)
Dept: SURGERY | Facility: CLINIC | Age: 33
End: 2023-01-05
Payer: COMMERCIAL

## 2023-01-05 ENCOUNTER — PRE VISIT (OUTPATIENT)
Dept: SURGERY | Facility: CLINIC | Age: 33
End: 2023-01-05

## 2023-01-05 DIAGNOSIS — M79.671 RIGHT FOOT PAIN: ICD-10-CM

## 2023-01-05 DIAGNOSIS — Z01.818 PREOP EXAM FOR INTERNAL MEDICINE: Primary | ICD-10-CM

## 2023-01-05 PROCEDURE — 99215 OFFICE O/P EST HI 40 MIN: CPT | Mod: GT | Performed by: CLINICAL NURSE SPECIALIST

## 2023-01-05 RX ORDER — TAMSULOSIN HYDROCHLORIDE 0.4 MG/1
CAPSULE ORAL EVERY MORNING
COMMUNITY
Start: 2022-12-22 | End: 2023-05-16

## 2023-01-05 ASSESSMENT — ENCOUNTER SYMPTOMS: SEIZURES: 0

## 2023-01-05 ASSESSMENT — LIFESTYLE VARIABLES: TOBACCO_USE: 0

## 2023-01-05 ASSESSMENT — PAIN SCALES - GENERAL: PAINLEVEL: NO PAIN (0)

## 2023-01-05 NOTE — H&P
Pre-Operative H & P     CC:  Preoperative exam to assess for increased cardiopulmonary risk while undergoing surgery and anesthesia.    Date of Encounter: 1/5/2023  Primary Care Physician:  No primary care provider on file.     Reason for visit:   Encounter Diagnoses   Name Primary?     Preop exam Yes     Right foot pain        HPI  Toma Blanco is a 32 year old female who presents for pre-operative H & P in preparation for  Procedure Information     Case: [id number] Date/Time: 01/17/23 1230    Procedure: Excision and neuroplasty of Right plantar foot mass (Right: Foot)    Anesthesia type: Choice    Diagnosis: Right foot pain [M79.671]    Pre-op diagnosis: Right foot pain [M79.671]    Location: [facility] OR 14 / [facility] OR    Providers: Hunter Redding MD        History is obtained from the patient and chart review    Patient who has been recently followed by Dr. Redding for a mass along the plantar surface of the right foot for several years. This area has been increasing in size and she has begun having more frequent symptoms of dysesthesias involving the second third toes. She also has tenderness and tingling or shock type feelings into her toes with palpation. Her imaging was reviewed and she was counseled for above procedures.     She was originally evaluated in the Preop Assessment Center on 12/6/22 for an earlier surgery date, however she had to postpone surgery due to influenza, now resolved. In addition on 12/22/22 she had an ED visit for a kidney stone which was treated conservatively, and symptoms resolved. She remains on Flomax and will have Urology follow up in the future. She has additional history of a passed kidney stone in 7/2022.     She is now preparing for her surgery again and was seen virtually today for an updated preop evaluation.      Her history is otherwise significant for hidradenitis suppurativa, well managed. She developed an acute DVT of her left lower extremity post partum in 5/30/2021 and was  anticoagulated for a time. No recurrence and she is no longer on anticoagulation. She does have family history of PE in a paternal uncle and DVT in her maternal grandmother. She was evaluated by Hematology and her work up was negative for inherited or other factors.     Hx of abnormal bleeding or anti-platelet use: Denies    Menstrual history: Patient's last menstrual period was 12/19/2022 (exact date).     Past Medical History  Past Medical History:   Diagnosis Date     Acute deep vein thrombosis (DVT) of proximal vein of left lower extremity (H)     post partum     Anxiety      Hidradenitis suppurativa      Infection due to 2019 novel coronavirus      Nephrolithiasis      Plantar fascial fibromatosis of right foot        Past Surgical History  Past Surgical History:   Procedure Laterality Date     [place] teeth extraction         Prior to Admission Medications  Current Outpatient Medications   Medication Sig Dispense Refill     acetaminophen (TYLENOL) 325 MG tablet Take 325-650 mg by mouth       ibuprofen (ADVIL/MOTRIN) 200 MG tablet Take 200-600 mg by mouth every 6 hours as needed       tamsulosin (FLOMAX) 0.4 MG capsule every morning       chlorhexidine (HIBICLENS) 4 % liquid Apply topically daily (Patient not taking: Reported on 12/6/2022) 473 mL 11     clindamycin (CLEOCIN T) 1 % external lotion Apply topically 2 times daily (Patient not taking: Reported on 10/17/2022) 60 mL 3     clindamycin (CLEOCIN) 300 MG capsule Take 1 capsule (300 mg) by mouth 2 times daily (Patient not taking: Reported on 10/17/2022) 60 capsule 3     doxycycline monohydrate (MONODOX) 50 MG capsule Take 50 mg by mouth (Patient not taking: Reported on 12/6/2022)       tretinoin (RETIN-A) 0.025 % external cream Apply topically At Bedtime (Patient not taking: Reported on 10/17/2022) 45 g 3     Zinc 30 MG TABS  (Patient not taking: Reported on 12/6/2022)         Allergies  No Known Allergies    Social History  Social History     Socioeconomic  History     Marital status:      Spouse name: Not on file     Number of children: Not on file     Years of education: Not on file     Highest education level: Not on file   Occupational History     Not on file   Tobacco Use     Smoking status: Never     Smokeless tobacco: Never   Substance and Sexual Activity     Alcohol use: Yes     Alcohol/week: 1.0 - 2.0 standard drink     Types: 1 - 2 Standard drinks or equivalent per week     Comment: once weekly     Drug use: Never     Sexual activity: Not on file   Other Topics Concern     Not on file   Social History Narrative     Not on file     Social Determinants of Health     Financial Resource Strain: Not on file   Food Insecurity: Not on file   Transportation Needs: Not on file   Physical Activity: Not on file   Stress: Not on file   Social Connections: Not on file   Intimate Partner Violence: Not on file   Housing Stability: Not on file       Family History  Family History   Problem Relation Age of Onset     Diabetes Father      Kidney Disease Father      Post Operative Nausea and Vomiting Sister      Diabetes Brother      Mental Illness Brother      Deep Vein Thrombosis (DVT) Maternal Grandmother      Dementia Maternal Grandfather      Heart Disease Maternal Grandfather      Dementia Paternal Grandmother      Diabetes Paternal Grandfather      Heart Failure Paternal Grandfather       Birth Son      Pulmonary Embolism Paternal Uncle        Review of Systems  The complete review of systems is negative other than noted in the HPI or here.   Anesthesia Evaluation   Pt has had prior anesthetic. Type: MAC and Regional.    No history of anesthetic complications       ROS/MED HX  ENT/Pulmonary:     (+) DIMAS risk factors, obese, recent URI, resolved, influenza early to mid December:  (-) tobacco use   Neurologic:  - neg neurologic ROS  (-) no seizures and no CVA   Cardiovascular: Comment: BP range 110/70    (+) -----Irregular Heartbeat/Palpitations, No previous  cardiac testing  (-) taking anticoagulants/antiplatelets   METS/Exercise Tolerance: >4 METS    Hematologic:     (+) History of blood clots, pt is not anticoagulated,  (-) history of blood transfusion   Musculoskeletal: Comment: Foot pain      GI/Hepatic:    (-) GERD   Renal/Genitourinary: Comment: Recent kidney stone with ED visit 12/22/22. Resolved, assumed passed    (+) Nephrolithiasis ,     Endo:     (+) Obesity,     Psychiatric/Substance Use:  - neg psychiatric ROS     Infectious Disease:  - neg infectious disease ROS     Malignancy:  - neg malignancy ROS     Other: Comment: Hidradenitis suppurativa           Virtual visit -  No vitals were obtained    Physical Exam  Constitutional: Awake, alert, no apparent distress, and appears stated age.  HENT: Normocephalic  Respiratory: non labored breathing; no cough   Neurologic: Oriented to name, place and time.   Neuropsychiatric: Calm, cooperative. Normal affect.      Prior Labs/Diagnostic Studies   All labs and imaging personally reviewed   OSH 12/22/22  WBC 8.2  Hgb 14.1  hematocrit 41.8  Platelets 311    Na 140  K 3.9  Cl 111  Glu 121  Cr 0.80    EKG: Not indicated    US Venous lower extremity 11/5/22      No deep venous thrombosis in the left lower extremity.     MR right foot 8/29/22     Enhancing 1.7 cm lesion which is within or displacing the distal plantar fascia. While a plantar fibroma could cause this appearance, the lesion does have certain imaging characteristics that suggest that it is deep to the plantar fascia, and therefore an etiology such as a neurogenic/nerve sheath tumor is favored. Recommend tissue diagnosis.    CT abd/pelvis 12/22/22  Obstructing 5 mm stone distal right ureter with moderate upstream ureterectasis and hydronephrosis. Findings are similar to previous findings on 6/22/2022.     The patient's records and results personally reviewed by this provider.     Outside records reviewed from: Care Everywhere      Assessment      Toma MUKHERJEE  "Francis is a 32 year old female seen as a PAC referral for risk assessment and optimization for anesthesia.    Plan/Recommendations  Pt will be optimized for the proposed procedure.  See below for details on the assessment, risk, and preoperative recommendations    NEUROLOGY  - No history of TIA, CVA or seizure    -Post Op delirium risk factors:  No risk identified    ENT  - No current airway concerns.  Will need to be reassessed day of surgery.  Mallampati: Unable to assess  TM: Unable to assess    CARDIAC  No cardiac history, symptoms or meds. Good exercise tolerance. /70  - METS (Metabolic Equivalents)>4    RCRI: 0.4% risk of serious cardiac events    PULMONARY  Denies asthma, cough or shortness of breath.      Low risk for DIMAS    - Tobacco History      History   Smoking Status     Never   Smokeless Tobacco     Never       GI: Denies GERD  PONV Medium Risk  Total Score: 2           1 AN PONV: Pt is Female    1 AN PONV: Patient is not a current smoker      Family history of PONV in sister.    /RENAL  - Baseline Creatinine  0.80  - History of kidney stones passed 7/2022 and 12/22/22. Will take Flomax on DOS. Has planned Urology follow up.    ENDOCRINE   - BMI: Estimated body mass index is 35.68 kg/m  as calculated from the following:    Height as of 12/6/22: 1.791 m (5' 10.5\").    Weight as of 12/6/22: 114.4 kg (252 lb 3.2 oz).  Obesity (BMI >30)  - No history of Diabetes Mellitus  Random BG on 12/6/22- 94, in ED 12/22/22 121.    HEME VTE risk 3%  Personal and family history of blood clots  Patient had provoked DVT left calf (posterior tibial and peroneal vein) DVT on 5/30/21. Anticoagulated for a time. No recurrence. Heme evaluation negative.   Patient voices concern for post op DVT since she will have to be immobile for 3 weeks after surgery. Encouraged her to contact her Hematologist for recommendations and to inform Dr. Redding of these recommendations.    Patient did discuss with Hematologist who " recommends that patient have postoperative prophylactic Lovenox 40 mg daily until she is no longer immobile (nonweightbearing).  This recommendation has been provided to Dr. Redding/team     Denies history of blood transfusion     MSK: Right foot pain.  Patient is not frail     SKIN: Hydradenitis suppurativa in groin area. Controlled with prn meds.      Different anesthesia methods/types have been discussed with the patient, but they are aware that the final plan will be decided by the assigned anesthesia provider on the date of service.    Patient was discussed with Dr. Salas Will proceed    The patient is optimized for their procedure. AVS with information on surgery time/arrival time, meds and NPO status given by nursing staff. No further diagnostic testing indicated.    Please refer to the physical examination documented by the anesthesiologist in the anesthesia record on the day of surgery.    Video-Visit Details    Type of service:  Video Visit    Provider received verbal consent for a Video Visit from the patient? Yes     Originating Location (pt. Location): Home    Distant Location (provider location):  Off-site  Mode of Communication:  Video Conference via simpleFLOORS  On the day of service:     Prep time:15 minutes  Visit time: 12 minutes  Documentation time: 22 minutes  ------------------------------------------  Total time: 49 minutes      ROSITA Brownlee CNS  Preoperative Assessment Center  [facility]  Clinic and Surgery Center  Phone: [phone]  Fax: [phone]

## 2023-01-05 NOTE — PROGRESS NOTES
Toma is a 32 year old who is being evaluated via a billable video visit.      How would you like to obtain your AVS? MyChart    Pre-Op Exam      HPI       Review of Systems         Objective    Vitals - Patient Reported  Pain Score: No Pain (0)        Physical Exam       SUSAN Morales LPN

## 2023-01-05 NOTE — PATIENT INSTRUCTIONS
Preparing for Your Surgery      Name:  Toma Blanco   MRN:  [id number]   :  1990   Today's Date:  2023       Arriving for surgery:  Surgery date:  23  Arrival time:  10 am     Surgeries and procedures: Adult patients can have 2 visitors all through the surgery process.     Visiting hours: 8 a.m. to 8:30 p.m.     Hospital: Adult patients and children under age 18 can have 4 visitor at a time     No visitors under the age of 5 are allowed for hospital patients.  Double occupancy rooms: Patients can have only two visitors at a time.     Patients with disabilities: Can have a support person with them (family member, service provider     Or someone well informed about their needs) plus the allowed number of visitors     Patients confirmed or suspected to have symptoms of COVID 19 or flu:     No visitors allowed for adult patients.   Children (under age 18) can have 1 named visitor.     People who are sick or showing symptoms of COVID 19 or flu:    Are not allowed to visit patients--we can only make exceptions in special situations.       Please follow these guidelines for your visit:   Arrive wearing a mask over your mouth and nose; we will give you a medical mask to wear    If you arrive wearing a cloth mask.   Keep it on during your entire visit, even when in patient's room.   If you don't wear a mask we'll ask you to leave.     Clean your hands with alcohol hand . Do this when you arrive at and leave the building and patient room,    And again after you touch your mask or anything in the room.     You can t visit if you have a fever, cough, shortness of breath, muscle aches, headaches, sore throat    Or diarrhea      Stay 6 feet away from others during your visit and between visits     Go directly to and from the room you are visiting.     Stay in the patient s room during your visit. Limit going to other places in the hospital as much as possible     Leave bags and jackets at home or  in the car.     For everyone s health, please don t come and go during your visit. That includes for smoking   during your visit.     Please come to:     [facility] [address]  (Address takes you to the [facility]  [address] [place]  [address]    -Parking is available in the Green Lot.    -Proceed to the 3rd floor, check in at the Adult Surgery Waiting Lounge. [phone]    If an escort is needed stop at the Information Desk in the lobby. Inform the information person that you are here for surgery. An escort to the Adult Surgery Waiting Lounge will be provided.    What can I eat or drink?  -  You may eat and drink normally up to 8 hours prior to arrival time. (Until 2 am)  -  You may have clear liquids until 2 hours prior to arrival time. (Until 8 am)    Examples of clear liquids:  Water  Clear broth  Juices (apple, white grape, white cranberry  and cider) without pulp  Noncarbonated, powder based beverages  (lemonade and Riaz-Aid)  Sodas (Sprite, 7-Up, ginger ale and seltzer)  Coffee or tea (without milk or cream)  Gatorade    -  No Alcohol for at least 24 hours before surgery.     Which medicines can I take?  Hold Aspirin for 7 days before surgery.   Hold Multivitamins for 7 days before surgery.  Hold Supplements for 7 days before surgery.  Hold Ibuprofen (Advil, Motrin) for 1 day before surgery--unless otherwise directed by surgeon.  Hold Naproxen (Aleve) for 4 days before surgery.    -  PLEASE TAKE these medications the day of surgery:  Tamsulosin (Flomax),  Acetaminophen (Tylenol) if needed    How do I prepare myself?  - Please take 2 showers before surgery using Scrubcare or Hibiclens soap.    Use this soap only from the neck to your toes.     Leave the soap on your skin for one minute--then rinse thoroughly.      You may use your own shampoo and conditioner. No other hair products.   - Please remove all jewelry and body  piercings.  - No lotions, deodorants or fragrance.  - No makeup or fingernail polish.   - Bring your ID and insurance card.    -If you have a Deep Brain Stimulator, Spinal Cord Stimulator, or any Neuro Stimulator device---you must bring the remote control to the hospital.      ALL PATIENTS GOING HOME THE SAME DAY OF SURGERY ARE REQUIRED TO HAVE A RESPONSIBLE ADULT TO DRIVE AND BE IN ATTENDANCE WITH THEM FOR 24 HOURS FOLLOWING SURGERY.    Covid testing policy as of 12/06/2022  Your surgeon will notify and schedule you for a COVID test if one is needed before surgery--please direct any questions or COVID symptoms to your surgeon      Questions or Concerns:    - For any questions regarding the day of surgery or your hospital stay, please contact the Pre Admission Nursing Office at [phone].       - If you have health changes between today and your surgery, please call your surgeon.       - For questions after surgery, please call your surgeons office.

## 2023-01-09 ENCOUNTER — TEAM CONFERENCE (OUTPATIENT)
Dept: ORTHOPEDICS | Facility: CLINIC | Age: 33
End: 2023-01-09

## 2023-01-09 NOTE — TELEPHONE ENCOUNTER
===View-only below this line===  ----- Message -----  From: Brittany Overton APRN CNS  Sent: 1/5/2023   4:23 PM CST  To: Hunter Redding MD    Patient received the following recommendations from her Hematologist. I believe this was provided for your team but making sure:    Hematologist recommends that patient have post operative prophylactic Lovenox 40 mg daily until she is no longer immobile (nonweightbearing).    Brittany LYN

## 2023-01-16 ASSESSMENT — ENCOUNTER SYMPTOMS: SEIZURES: 0

## 2023-01-16 ASSESSMENT — LIFESTYLE VARIABLES: TOBACCO_USE: 0

## 2023-01-16 NOTE — ANESTHESIA PREPROCEDURE EVALUATION
Anesthesia Pre-Procedure Evaluation    Patient: Toma Blanco   MRN: [id number] : 1990        Procedure : Procedure(s):  Excision and Neuroplasty of Right Plantar Foot Mass          Past Medical History:   Diagnosis Date     Acute deep vein thrombosis (DVT) of proximal vein of left lower extremity (H)     post partum     Anxiety      Hidradenitis suppurativa      Infection due to 2019 novel coronavirus      Nephrolithiasis      Plantar fascial fibromatosis of right foot       Past Surgical History:   Procedure Laterality Date     [place] teeth extraction        No Known Allergies   Social History     Tobacco Use     Smoking status: Never     Smokeless tobacco: Never   Substance Use Topics     Alcohol use: Yes     Alcohol/week: 1.0 - 2.0 standard drink     Types: 1 - 2 Standard drinks or equivalent per week     Comment: once weekly      Wt Readings from Last 1 Encounters:   22 114.4 kg (252 lb 3.2 oz)        Anesthesia Evaluation   Pt has had prior anesthetic. Type: MAC and Regional.    No history of anesthetic complications       ROS/MED HX  ENT/Pulmonary:     (+) DIMAS risk factors, obese, recent URI, resolved, influenza early to mid December:  (-) tobacco use   Neurologic:  - neg neurologic ROS  (-) no seizures and no CVA   Cardiovascular: Comment: BP range 110/70    (+) -----Irregular Heartbeat/Palpitations, No previous cardiac testing  (-) taking anticoagulants/antiplatelets   METS/Exercise Tolerance: >4 METS    Hematologic:     (+) History of blood clots, pt is not anticoagulated,  (-) history of blood transfusion   Musculoskeletal: Comment: Foot pain      GI/Hepatic:    (-) GERD   Renal/Genitourinary: Comment: Recent kidney stone with ED visit 22. Resolved, assumed passed    (+) Nephrolithiasis ,     Endo:     (+) Obesity,     Psychiatric/Substance Use:  - neg psychiatric ROS     Infectious Disease:  - neg infectious disease ROS     Malignancy:  - neg malignancy ROS     Other: Comment:  Hidradenitis suppurativa           Physical Exam    Airway        Mallampati: II       Respiratory Devices and Support         Dental           Cardiovascular          Rhythm and rate: regular     Pulmonary           breath sounds clear to auscultation           OUTSIDE LABS:  CBC:   Lab Results   Component Value Date    WBC 8.8 12/06/2022    WBC 8.9 12/06/2014    HGB 13.4 12/06/2022    HGB 12.7 12/06/2014    HCT 41.0 12/06/2022    HCT 39.1 12/06/2014     12/06/2022     12/06/2014     BMP:   Lab Results   Component Value Date     12/06/2022    POTASSIUM 4.6 12/06/2022    CHLORIDE 104 12/06/2022    CO2 24 12/06/2022    BUN 9.5 12/06/2022    CR 0.72 12/06/2022    GLC 94 12/06/2022     COAGS: No results found for: PTT, INR, FIBR  POC: No results found for: BGM, HCG, HCGS  HEPATIC: No results found for: ALBUMIN, PROTTOTAL, ALT, AST, GGT, ALKPHOS, BILITOTAL, BILIDIRECT, MANE  OTHER:   Lab Results   Component Value Date    TAURUS 9.1 12/06/2022    SED 27 (H) 12/06/2014       Anesthesia Plan    ASA Status:  2   NPO Status:  NPO Appropriate    Anesthesia Type: General.     - Airway: ETT   Induction: Intravenous.   Maintenance: TIVA.        Consents    Anesthesia Plan(s) and associated risks, benefits, and realistic alternatives discussed. Questions answered and patient/representative(s) expressed understanding.    - Discussed:     - Discussed with:  Patient         Postoperative Care    Pain management: Oral pain medications, IV analgesics, Multi-modal analgesia.   PONV prophylaxis: Ondansetron (or other 5HT-3), Dexamethasone or Solumedrol     Comments:                Pierre Swift MD

## 2023-01-17 ENCOUNTER — ANESTHESIA (OUTPATIENT)
Dept: SURGERY | Facility: CLINIC | Age: 33
End: 2023-01-17
Payer: COMMERCIAL

## 2023-01-17 ENCOUNTER — HOSPITAL ENCOUNTER (OUTPATIENT)
Facility: CLINIC | Age: 33
Discharge: HOME | End: 2023-01-17
Attending: ORTHOPAEDIC SURGERY | Admitting: ORTHOPAEDIC SURGERY
Payer: COMMERCIAL

## 2023-01-17 ENCOUNTER — DOCUMENTATION ONLY (OUTPATIENT)
Dept: SURGERY | Facility: CLINIC | Age: 33
End: 2023-01-17

## 2023-01-17 VITALS
SYSTOLIC BLOOD PRESSURE: 103 MMHG | DIASTOLIC BLOOD PRESSURE: 53 MMHG | RESPIRATION RATE: 16 BRPM | BODY MASS INDEX: 35.85 KG/M2 | OXYGEN SATURATION: 99 % | HEIGHT: 70 IN | WEIGHT: 250.44 LBS | HEART RATE: 62 BPM | TEMPERATURE: 97.7 F

## 2023-01-17 DIAGNOSIS — M79.89 MASS OF SOFT TISSUE OF FOOT: Primary | ICD-10-CM

## 2023-01-17 PROCEDURE — 370N000017 HC ANESTHESIA TECHNICAL FEE, PER MIN: Performed by: ORTHOPAEDIC SURGERY

## 2023-01-17 PROCEDURE — 258N000003 HC RX IP 258 OP 636: Performed by: NURSE ANESTHETIST, CERTIFIED REGISTERED

## 2023-01-17 PROCEDURE — 250N000013 HC RX MED GY IP 250 OP 250 PS 637: Performed by: PHYSICIAN ASSISTANT

## 2023-01-17 PROCEDURE — 360N000075 HC SURGERY LEVEL 2, PER MIN: Performed by: ORTHOPAEDIC SURGERY

## 2023-01-17 PROCEDURE — 250N000011 HC RX IP 250 OP 636: Performed by: ANESTHESIOLOGY

## 2023-01-17 PROCEDURE — 250N000013 HC RX MED GY IP 250 OP 250 PS 637: Performed by: ANESTHESIOLOGY

## 2023-01-17 PROCEDURE — 710N000012 HC RECOVERY PHASE 2, PER MINUTE: Performed by: ORTHOPAEDIC SURGERY

## 2023-01-17 PROCEDURE — 710N000010 HC RECOVERY PHASE 1, LEVEL 2, PER MIN: Performed by: ORTHOPAEDIC SURGERY

## 2023-01-17 PROCEDURE — 88307 TISSUE EXAM BY PATHOLOGIST: CPT | Mod: TC | Performed by: ORTHOPAEDIC SURGERY

## 2023-01-17 PROCEDURE — 999N000141 HC STATISTIC PRE-PROCEDURE NURSING ASSESSMENT: Performed by: ORTHOPAEDIC SURGERY

## 2023-01-17 PROCEDURE — 250N000009 HC RX 250: Performed by: NURSE ANESTHETIST, CERTIFIED REGISTERED

## 2023-01-17 PROCEDURE — 28045 EXC FOOT/TOE TUM DEEP <1.5CM: CPT | Mod: RT | Performed by: ORTHOPAEDIC SURGERY

## 2023-01-17 PROCEDURE — 250N000011 HC RX IP 250 OP 636: Performed by: PHYSICIAN ASSISTANT

## 2023-01-17 PROCEDURE — 272N000001 HC OR GENERAL SUPPLY STERILE: Performed by: ORTHOPAEDIC SURGERY

## 2023-01-17 PROCEDURE — 250N000025 HC SEVOFLURANE, PER MIN: Performed by: ORTHOPAEDIC SURGERY

## 2023-01-17 PROCEDURE — 250N000011 HC RX IP 250 OP 636: Performed by: NURSE ANESTHETIST, CERTIFIED REGISTERED

## 2023-01-17 RX ORDER — ONDANSETRON 4 MG/1
4 TABLET, ORALLY DISINTEGRATING ORAL EVERY 8 HOURS PRN
Qty: 4 TABLET | Refills: 0 | Status: SHIPPED | OUTPATIENT
Start: 2023-01-17 | End: 2023-05-16

## 2023-01-17 RX ORDER — SODIUM CHLORIDE, SODIUM LACTATE, POTASSIUM CHLORIDE, CALCIUM CHLORIDE 600; 310; 30; 20 MG/100ML; MG/100ML; MG/100ML; MG/100ML
INJECTION, SOLUTION INTRAVENOUS CONTINUOUS
Status: DISCONTINUED | OUTPATIENT
Start: 2023-01-17 | End: 2023-01-17 | Stop reason: HOSPADM

## 2023-01-17 RX ORDER — FENTANYL CITRATE 50 UG/ML
25 INJECTION, SOLUTION INTRAMUSCULAR; INTRAVENOUS
Status: DISCONTINUED | OUTPATIENT
Start: 2023-01-17 | End: 2023-01-17 | Stop reason: HOSPADM

## 2023-01-17 RX ORDER — FENTANYL CITRATE 50 UG/ML
50 INJECTION, SOLUTION INTRAMUSCULAR; INTRAVENOUS EVERY 5 MIN PRN
Status: DISCONTINUED | OUTPATIENT
Start: 2023-01-17 | End: 2023-01-17 | Stop reason: HOSPADM

## 2023-01-17 RX ORDER — PROPOFOL 10 MG/ML
INJECTION, EMULSION INTRAVENOUS PRN
Status: DISCONTINUED | OUTPATIENT
Start: 2023-01-17 | End: 2023-01-17

## 2023-01-17 RX ORDER — ONDANSETRON 2 MG/ML
INJECTION INTRAMUSCULAR; INTRAVENOUS PRN
Status: DISCONTINUED | OUTPATIENT
Start: 2023-01-17 | End: 2023-01-17

## 2023-01-17 RX ORDER — LIDOCAINE HYDROCHLORIDE 20 MG/ML
INJECTION, SOLUTION INFILTRATION; PERINEURAL PRN
Status: DISCONTINUED | OUTPATIENT
Start: 2023-01-17 | End: 2023-01-17

## 2023-01-17 RX ORDER — FENTANYL CITRATE 50 UG/ML
25 INJECTION, SOLUTION INTRAMUSCULAR; INTRAVENOUS EVERY 5 MIN PRN
Status: DISCONTINUED | OUTPATIENT
Start: 2023-01-17 | End: 2023-01-17 | Stop reason: HOSPADM

## 2023-01-17 RX ORDER — OXYCODONE HYDROCHLORIDE 5 MG/1
5 TABLET ORAL EVERY 4 HOURS PRN
Status: DISCONTINUED | OUTPATIENT
Start: 2023-01-17 | End: 2023-01-17 | Stop reason: HOSPADM

## 2023-01-17 RX ORDER — ONDANSETRON 2 MG/ML
4 INJECTION INTRAMUSCULAR; INTRAVENOUS EVERY 30 MIN PRN
Status: DISCONTINUED | OUTPATIENT
Start: 2023-01-17 | End: 2023-01-17 | Stop reason: HOSPADM

## 2023-01-17 RX ORDER — NALOXONE HYDROCHLORIDE 0.4 MG/ML
0.2 INJECTION, SOLUTION INTRAMUSCULAR; INTRAVENOUS; SUBCUTANEOUS
Status: DISCONTINUED | OUTPATIENT
Start: 2023-01-17 | End: 2023-01-17 | Stop reason: HOSPADM

## 2023-01-17 RX ORDER — FENTANYL CITRATE 50 UG/ML
INJECTION, SOLUTION INTRAMUSCULAR; INTRAVENOUS PRN
Status: DISCONTINUED | OUTPATIENT
Start: 2023-01-17 | End: 2023-01-17

## 2023-01-17 RX ORDER — SODIUM CHLORIDE, SODIUM LACTATE, POTASSIUM CHLORIDE, CALCIUM CHLORIDE 600; 310; 30; 20 MG/100ML; MG/100ML; MG/100ML; MG/100ML
INJECTION, SOLUTION INTRAVENOUS CONTINUOUS PRN
Status: DISCONTINUED | OUTPATIENT
Start: 2023-01-17 | End: 2023-01-17

## 2023-01-17 RX ORDER — DEXAMETHASONE SODIUM PHOSPHATE 4 MG/ML
INJECTION, SOLUTION INTRA-ARTICULAR; INTRALESIONAL; INTRAMUSCULAR; INTRAVENOUS; SOFT TISSUE PRN
Status: DISCONTINUED | OUTPATIENT
Start: 2023-01-17 | End: 2023-01-17

## 2023-01-17 RX ORDER — ACETAMINOPHEN 325 MG/1
650 TABLET ORAL
Status: DISCONTINUED | OUTPATIENT
Start: 2023-01-17 | End: 2023-01-17 | Stop reason: HOSPADM

## 2023-01-17 RX ORDER — ACETAMINOPHEN 325 MG/1
975 TABLET ORAL ONCE
Status: COMPLETED | OUTPATIENT
Start: 2023-01-17 | End: 2023-01-17

## 2023-01-17 RX ORDER — HYDROMORPHONE HYDROCHLORIDE 1 MG/ML
0.2 INJECTION, SOLUTION INTRAMUSCULAR; INTRAVENOUS; SUBCUTANEOUS EVERY 5 MIN PRN
Status: DISCONTINUED | OUTPATIENT
Start: 2023-01-17 | End: 2023-01-17 | Stop reason: HOSPADM

## 2023-01-17 RX ORDER — KETOROLAC TROMETHAMINE 30 MG/ML
INJECTION, SOLUTION INTRAMUSCULAR; INTRAVENOUS PRN
Status: DISCONTINUED | OUTPATIENT
Start: 2023-01-17 | End: 2023-01-17

## 2023-01-17 RX ORDER — OXYCODONE HYDROCHLORIDE 10 MG/1
10 TABLET ORAL EVERY 4 HOURS PRN
Status: DISCONTINUED | OUTPATIENT
Start: 2023-01-17 | End: 2023-01-17 | Stop reason: HOSPADM

## 2023-01-17 RX ORDER — HYDROMORPHONE HYDROCHLORIDE 1 MG/ML
0.4 INJECTION, SOLUTION INTRAMUSCULAR; INTRAVENOUS; SUBCUTANEOUS EVERY 5 MIN PRN
Status: DISCONTINUED | OUTPATIENT
Start: 2023-01-17 | End: 2023-01-17 | Stop reason: HOSPADM

## 2023-01-17 RX ORDER — OXYCODONE HYDROCHLORIDE 5 MG/1
5 TABLET ORAL EVERY 6 HOURS PRN
Qty: 10 TABLET | Refills: 0 | Status: SHIPPED | OUTPATIENT
Start: 2023-01-17 | End: 2023-05-16

## 2023-01-17 RX ORDER — ACETAMINOPHEN 325 MG/1
650 TABLET ORAL EVERY 4 HOURS PRN
Qty: 50 TABLET | Refills: 0 | Status: SHIPPED | OUTPATIENT
Start: 2023-01-17 | End: 2023-05-16

## 2023-01-17 RX ORDER — OXYCODONE HYDROCHLORIDE 5 MG/1
5 TABLET ORAL
Status: DISCONTINUED | OUTPATIENT
Start: 2023-01-17 | End: 2023-01-17 | Stop reason: HOSPADM

## 2023-01-17 RX ORDER — ONDANSETRON 4 MG/1
4 TABLET, ORALLY DISINTEGRATING ORAL EVERY 30 MIN PRN
Status: DISCONTINUED | OUTPATIENT
Start: 2023-01-17 | End: 2023-01-17 | Stop reason: HOSPADM

## 2023-01-17 RX ORDER — MEPERIDINE HYDROCHLORIDE 25 MG/ML
12.5 INJECTION INTRAMUSCULAR; INTRAVENOUS; SUBCUTANEOUS
Status: DISCONTINUED | OUTPATIENT
Start: 2023-01-17 | End: 2023-01-17 | Stop reason: HOSPADM

## 2023-01-17 RX ORDER — NALOXONE HYDROCHLORIDE 0.4 MG/ML
0.4 INJECTION, SOLUTION INTRAMUSCULAR; INTRAVENOUS; SUBCUTANEOUS
Status: DISCONTINUED | OUTPATIENT
Start: 2023-01-17 | End: 2023-01-17 | Stop reason: HOSPADM

## 2023-01-17 RX ORDER — AMOXICILLIN 250 MG
1-2 CAPSULE ORAL 2 TIMES DAILY
Qty: 30 TABLET | Refills: 0 | Status: SHIPPED | OUTPATIENT
Start: 2023-01-17 | End: 2023-05-16

## 2023-01-17 RX ORDER — CEFAZOLIN SODIUM/WATER 2 G/20 ML
2 SYRINGE (ML) INTRAVENOUS
Status: COMPLETED | OUTPATIENT
Start: 2023-01-17 | End: 2023-01-17

## 2023-01-17 RX ORDER — CEFAZOLIN SODIUM/WATER 2 G/20 ML
2 SYRINGE (ML) INTRAVENOUS SEE ADMIN INSTRUCTIONS
Status: DISCONTINUED | OUTPATIENT
Start: 2023-01-17 | End: 2023-01-17 | Stop reason: HOSPADM

## 2023-01-17 RX ADMIN — FENTANYL CITRATE 50 MCG: 50 INJECTION INTRAMUSCULAR; INTRAVENOUS at 09:17

## 2023-01-17 RX ADMIN — ONDANSETRON 4 MG: 2 INJECTION INTRAMUSCULAR; INTRAVENOUS at 08:46

## 2023-01-17 RX ADMIN — FENTANYL CITRATE 75 MCG: 50 INJECTION, SOLUTION INTRAMUSCULAR; INTRAVENOUS at 08:08

## 2023-01-17 RX ADMIN — LIDOCAINE HYDROCHLORIDE 60 MG: 20 INJECTION, SOLUTION INFILTRATION; PERINEURAL at 08:08

## 2023-01-17 RX ADMIN — DEXAMETHASONE SODIUM PHOSPHATE 4 MG: 4 INJECTION, SOLUTION INTRA-ARTICULAR; INTRALESIONAL; INTRAMUSCULAR; INTRAVENOUS; SOFT TISSUE at 08:19

## 2023-01-17 RX ADMIN — ACETAMINOPHEN 975 MG: 325 TABLET ORAL at 07:08

## 2023-01-17 RX ADMIN — FENTANYL CITRATE 25 MCG: 50 INJECTION, SOLUTION INTRAMUSCULAR; INTRAVENOUS at 09:06

## 2023-01-17 RX ADMIN — PROPOFOL 200 MG: 10 INJECTION, EMULSION INTRAVENOUS at 08:08

## 2023-01-17 RX ADMIN — MIDAZOLAM 2 MG: 1 INJECTION INTRAMUSCULAR; INTRAVENOUS at 08:00

## 2023-01-17 RX ADMIN — Medication 50 MG: at 08:09

## 2023-01-17 RX ADMIN — PROPOFOL 150 MCG/KG/MIN: 10 INJECTION, EMULSION INTRAVENOUS at 08:25

## 2023-01-17 RX ADMIN — SODIUM CHLORIDE, POTASSIUM CHLORIDE, SODIUM LACTATE AND CALCIUM CHLORIDE: 600; 310; 30; 20 INJECTION, SOLUTION INTRAVENOUS at 08:00

## 2023-01-17 RX ADMIN — OXYCODONE HYDROCHLORIDE 5 MG: 5 TABLET ORAL at 09:37

## 2023-01-17 RX ADMIN — KETOROLAC TROMETHAMINE 30 MG: 30 INJECTION, SOLUTION INTRAMUSCULAR at 08:46

## 2023-01-17 RX ADMIN — Medication 2 G: at 08:15

## 2023-01-17 ASSESSMENT — ACTIVITIES OF DAILY LIVING (ADL)
ADLS_ACUITY_SCORE: 35

## 2023-01-17 NOTE — BRIEF OP NOTE
[facility]    Brief Operative Note    Pre-operative diagnosis: Right foot pain [M79.671]  Post-operative diagnosis Same as pre-operative diagnosis    Procedure: Procedure(s):  Excision and Neuroplasty of Right Plantar Foot Mass  Surgeon: Surgeon(s) and Role:     * Hunter Redding MD - Primary     * Mohit Cisneros PA-C - Assisting  Anesthesia: Choice   Estimated Blood Loss: Minimal    Drains: None  Specimens:   ID Type Source Tests Collected by Time Destination   1 : Right Foot Mass Tissue Foot, Right SURGICAL PATHOLOGY EXAM Hunter Redding MD 1/17/2023  8:48 AM      Findings:   See full operative note.  Complications: None.  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 Redding    Activity: Up with assist.  Weight bearing status: NWB RLE x2 weeks.  Antibiotics: Ancef periop comp;lete  Diet: Begin with clear fluids and progress diet as tolerated.  DVT prophylaxis: Eliquis for DVT PPx  Elevation: Elevate RLE as much as possible.  Wound Care: Dressing to remain clean/dry/intact  Pain management: Over-the-counter pain medications as needed, oxycodone prescription at discharge  Pathology: Monitor    Follow-up: Clinic with Mohit Cisneros PA-C in 2 weeks for wound check, suture removal, and review of surgical pathology    Disposition: Discharge home from PACU when meets criteria    Kris Ames MD  Orthopaedic Surgery PGY-4  [phone]    Please page me at [phone] with any questions/concerns. If there is no response, if it is a weekend, or if it is during evening hours then please page the orthopaedic surgery resident on call.

## 2023-01-17 NOTE — PROGRESS NOTES
Prior Authorization **APPROVED**    Authorization Effective Date: 12/18/2022  Authorization Expiration Date: 1/17/2024  Medication: Eliquis 2.5mg tabs **APPROVED**  Approved Dose/Quantity: 2 tablets daily  Reference #: CoverMyMeds Key: [id number] - [place] Case ID: [id number], [employer] Case ID: [id number]   Insurance Company: [employer] ([employer]) - Phone [phone] Fax [phone]  Expected CoPay: $0.00     CoPay Card Available: No    Foundation Assistance Needed: n/a  Which Pharmacy is filling the prescription (Not needed for infusion/clinic administered): [facility] [place], [place] - [address]  Pharmacy Notified: Yes  Patient Notified: Yes  Comments:  -        Oliva Coleman CPhT  [facility]charge [facility] Liaison  Pronouns: She/Her/Hers    [facility]  [address] [facility]  [address] [facility] [address], [place] [address]   [email or web address]  [email or web address]   Phone: [phone]  Pager: [phone]  Fax: [phone]

## 2023-01-17 NOTE — ANESTHESIA CARE TRANSFER NOTE
Patient: Toma Blanco    Procedure: Procedure(s):  Excision and Neuroplasty of Right Plantar Foot Mass       Diagnosis: Right foot pain [M79.671]  Diagnosis Additional Information: No value filed.    Anesthesia Type:   General     Note:    Oropharynx: oropharynx clear of all foreign objects  Level of Consciousness: drowsy  Oxygen Supplementation: face mask    Independent Airway: airway patency satisfactory and stable  Dentition: dentition unchanged  Vital Signs Stable: post-procedure vital signs reviewed and stable  Report to RN Given: handoff report given  Patient transferred to: PACU    Handoff Report: Identifed the Patient, Identified the Reponsible Provider, Reviewed the pertinent medical history, Discussed the surgical course, Reviewed Intra-OP anesthesia mangement and issues during anesthesia, Set expectations for post-procedure period and Allowed opportunity for questions and acknowledgement of understanding      Vitals:  Vitals Value Taken Time   BP     Temp     Pulse 71 01/17/23 0906   Resp 17 01/17/23 0906   SpO2 100 % 01/17/23 0906   Vitals shown include unvalidated device data.    Electronically Signed By: ROSITA Mcmillan CRNA  January 17, 2023  9:07 AM

## 2023-01-17 NOTE — PROGRESS NOTES
SPIRITUAL HEALTH SERVICES  [facility] [address]  PRE-SURGERY VISIT    Had pre-surgery visit with pt who had requested  and let them know that  was unable to visit. Pt declined  visit after surgery. Provided spiritual support.    Tessa James  Chaplain Resident  Pager [phone]    * [facility] remains available 24/7 for emergent requests/referrals, either by having the switchboard page the on-call  or by entering an ASAP/STAT consult in Epic (this will also page the on-call ). Routine Epic consults receive an initial response within 24 hours.*

## 2023-01-17 NOTE — OP NOTE
DATE OF SURGERY: 1/17/2023    PREOPERATIVE DIAGNOSIS: Right foot pain [M79.671]             POSTOPERATIVE DIAGNOSIS: Same as above    PROCEDURES:  1.  Marginal excision of right plantar midfoot, deep, subfascial soft tissue mass, 1 x 1 cm    PRIMARY SURGEON: Hunter Redding MD  ASSISTANTS:   1. Kris Ames MD, PGY-4  2. Mohit Cisneros PA-C    ANESTHESIA: General Endotracheal    COMPLICATIONS: None apparent immediately postoperatively.    SPECIMENS:       ID Type Source Tests Collected by Time Destination   1 : Right Foot Mass Tissue Foot, Right SURGICAL PATHOLOGY EXAM Hunter Redding MD 1/17/2023  8:48 AM          DRAINS: N/a    ESTIMATED BLOOD LOSS: 1 ml     INDICATIONS:                          Toma Blanco is a 32 year old female who noticed a mass on the plantar surface of the right foot for several years with increasing size and frequency of dysesthesias to the second and third toes.  On exam, palpable mass along the plantar surface of the right foot with associated tenderness and positive Tinel sign.  MRI with 1.7 cm enhancing lesion concerning for possible neurogenic versus nerve sheath tumor.  Patient extensively counseled on the risks, benefits, and alternatives to surgical intervention and elected to proceed    SIGNIFICANT FINDINGS:  1 x 1 cm subfascial plantar midfoot soft tissue mass    DESCRIPTION OF PROCEDURE:             Toma Blanco was met in the preoperative holding area where the correct surgical site was identified and marked by the primary surgeon. The patient was then taken to the operating room, where the Anesthesiology Service induced satisfactory general anesthesia.  Ancef was given IV.  Venous thromboembolic prophylaxis was performed with sequential devices. The patient was placed prone on the operating room table with all bony prominences well padded and a safety strap across the patient's back. A tourniquet was placed proximal to the surgical site on the operative extremity.  The patient was then prepped and draped in the usual sterile fashion. Prior to proceeding with the operation a timeout was held per hospital policy correctly identifying the patient, operative site, and procedure to be performed. All in the room agreed.    The right lower extremity was elevated to obtain exsanguination and the tourniquet was insufflated to 250 mmHg.  Palpable soft tissue mass was felt to determine the location of the incision.  A 2 cm incision in line with the second ray was created right over the palpable mass with a 15 blade scalpel.  The incision was carried down through the subcutaneous tissue to the level of the fascia.  The fascia was incised in line with the skin incision with proper visualization of the soft tissue mass.  The mass was mobilized circumferentially via blunt dissection with a Schnidt and the marginal excision completed by truncating the proximal soft tissues.  The specimen was sent in formalin for surgical pathology.  The fascia was closed with figure-of-eight 0 Vicryl sutures.  The skin was approximated with 3-0 nylon sutures.  The tourniquet was deflated and no significant bleeding appreciated.  10 cc of bupivacaine was injected about the incision for a local anesthesia.  The incision was dressed with Adaptic, 4 x 4 gauze, an ABD pad, Webril cast padding, and overlying Ace and immobilized with a postop shoe    All surgical counts were correct at the end of the case.    Dr. Redding was present for all critical portions of the case.    POSTOPERATIVE PLAN:  Activity: Up with assist.  Weight bearing status: NWB RLE x2 weeks.  Antibiotics: Ancef periop comp;lete  Diet: Begin with clear fluids and progress diet as tolerated.  DVT prophylaxis: Eliquis for DVT PPx  Elevation: Elevate RLE as much as possible.  Wound Care: Dressing to remain clean/dry/intact  Pain management: Over-the-counter pain medications as needed, oxycodone prescription at discharge  Pathology:  Monitor     Follow-up: Clinic with Mohit Cisneros PA-C in 2 weeks for wound check, suture removal, and review of surgical pathology     Disposition: Discharge home from PACU when meets criteria     Kris Ames MD  Orthopaedic Surgery PGY-4  [phone]     Please page me at [phone] with any questions/concerns. If there is no response, if it is a weekend, or if it is during evening hours then please page the orthopaedic surgery resident on call.      Attending MD (Dr. Hunter Redding) Attestation:  I was present during the key portions of the procedure and I was immediately available for the entire procedure between opening and closing.    MD Reji Moralez Family Professor  Oncology and Adult Reconstructive Surgery  Dept Orthopaedic Surgery, [facility]

## 2023-01-17 NOTE — DISCHARGE INSTRUCTIONS

## 2023-01-17 NOTE — ANESTHESIA POSTPROCEDURE EVALUATION
Patient: Toma Blanco    Procedure: Procedure(s):  Excision and Neuroplasty of Right Plantar Foot Mass       Anesthesia Type:  General    Note:  Disposition: Outpatient   Postop Pain Control: Uneventful            Sign Out: Well controlled pain   PONV: No   Neuro/Psych: Uneventful            Sign Out: Acceptable/Baseline neuro status   Airway/Respiratory: Uneventful            Sign Out: Acceptable/Baseline resp. status   CV/Hemodynamics: Uneventful            Sign Out: Acceptable CV status; No obvious hypovolemia; No obvious fluid overload   Other NRE: NONE   DID A NON-ROUTINE EVENT OCCUR?            Last vitals:  Vitals Value Taken Time   /58 01/17/23 1000   Temp 36.7  C (98  F) 01/17/23 1000   Pulse 45 01/17/23 1000   Resp 12 01/17/23 1000   SpO2 100 % 01/17/23 1006   Vitals shown include unvalidated device data.    Electronically Signed By: Pierre Swift MD  January 17, 2023  1:14 PM

## 2023-01-17 NOTE — ANESTHESIA PROCEDURE NOTES
Airway       Patient location during procedure: OR       Procedure Start/Stop Times: 1/17/2023 8:13 AM  Staff -        CRNA: Dilcia Snow APRN CRNA       Performed By: other anesthesia staff and resident  Consent for Airway        Urgency: elective  Indications and Patient Condition       Indications for airway management: arabella-procedural       Induction type:intravenous       Mask difficulty assessment: 1 - vent by mask    Final Airway Details       Final airway type: endotracheal airway       Successful airway: ETT - single and Oral  Endotracheal Airway Details        ETT size (mm): 7.5       Cuffed: yes       Successful intubation technique: video laryngoscopy       VL Blade Size: MAC 3       Grade View of Cords: 1       Adjucts: stylet       Position: Right       Measured from: gums/teeth       Secured at (cm): 22       Bite block used: Soft    Post intubation assessment        Placement verified by: capnometry, equal breath sounds and chest rise        Number of attempts at approach: 1       Secured with: silk tape       Ease of procedure: easy       Dentition: Intact and Unchanged    Medication(s) Administered   Medication Administration Time: 1/17/2023 8:13 AM

## 2023-01-19 LAB
PATH REPORT.COMMENTS IMP SPEC: NORMAL
PATH REPORT.COMMENTS IMP SPEC: NORMAL
PATH REPORT.FINAL DX SPEC: NORMAL
PATH REPORT.GROSS SPEC: NORMAL
PATH REPORT.MICROSCOPIC SPEC OTHER STN: NORMAL
PATH REPORT.RELEVANT HX SPEC: NORMAL
PHOTO IMAGE: NORMAL

## 2023-01-19 PROCEDURE — 88307 TISSUE EXAM BY PATHOLOGIST: CPT | Mod: 26 | Performed by: PATHOLOGY

## 2023-01-20 NOTE — RESULT ENCOUNTER NOTE
Dear Toma Blanco:    As expected, the pathology report confirmed a benign tumor known as schwannoma.  Nothing more to do for treatment.    Best regards,    Hunter Redding MD  [employer] Family Professor  Oncology and Adult Reconstructive Surgery  Dept Orthopaedic Surgery, [facility]  [phone] office  [email or web address]

## 2023-01-30 ENCOUNTER — OFFICE VISIT (OUTPATIENT)
Dept: ORTHOPEDICS | Facility: CLINIC | Age: 33
End: 2023-01-30
Payer: COMMERCIAL

## 2023-01-30 DIAGNOSIS — D36.13 SCHWANNOMA OF NERVE OF LOWER EXTREMITY: Primary | ICD-10-CM

## 2023-01-30 PROCEDURE — 99024 POSTOP FOLLOW-UP VISIT: CPT | Performed by: PHYSICIAN ASSISTANT

## 2023-01-30 NOTE — LETTER
1/30/2023         RE: Toma Blanco  [address] [place] [address]        Dear Colleague,    Thank you for referring your patient, Toma Blanco, to the [facility] [place]. Please see a copy of my visit note below.        [facility]  Orthopaedic Surgery  by Mohit Cisneros PA-C    Toma Blanco MRN# [id number]    YOB: 1990            Assessment and Plan:   Assessment:  32-year-old female who is approximately 2 weeks status post excision of right plantar foot mass.  Recovering appropriately (schwannoma)     Plan:    Sutures removed incisions healing well.  Steri-Strips placed.  She can start to weight-bear as tolerated and increase her activities as tolerated.  Should avoid high impact exercises for 2 weeks.  Recommended hard bottom shoes or postop boot for comfort.  Also wearing multiple layers of socks or padding could help with the sensitive area of incisional.    Pathology consistent with benign schwannoma.  No surveillance needed.  Recommended symptomatic surveillance.  She will follow-up on an as-needed basis     Mohit Cisneros PA-C  Physician Assistant   Oncology and Adult Reconstructive Surgery  Dept Orthopaedic Surgery, [facility]             History of Present Illness:   32 year old female who presents to clinic today 2 weeks status post excision of right foot soft tissue mass.  Overall she is doing well and her pain is controlled with no medications.  She has been nonweightbearing and using a knee scooter for ambulation.  Postoperatively she had some neuropathic pain which I prescribed gabapentin for.  She states that worked well to alleviate the pins-and-needles pain.  She states that the foot is still sensitive to the touch she denies any chest pain, shortness of breath, or concerns for infection.    Background history:  DX:  1. Right foot mass    TREATMENTS:  1. 1/17/2023, excision and neuroplasty of right plantar  foot mass ([place]) [facility]           Physical Exam:     EXAMINATION pertinent findings:   PSYCH: Pleasant, healthy-appearing, alert, oriented x3, cooperative. Normal mood and affect.  VITAL SIGNS: Last menstrual period 12/19/2022, not currently breastfeeding..  Reviewed nursing intake notes.   There is no height or weight on file to calculate BMI.  RESP: non labored breathing   ABD: benign, soft, non-tender, no acute peritoneal findings  SKIN: grossly normal   LYMPHATIC: grossly normal, no adenopathy, no extremity edema  NEURO: grossly normal , no motor deficits  VASCULAR: satisfactory perfusion of all extremities   MUSCULOSKELETAL:     Right foot: Incision is clean dry and intact with no dehiscence, erythema, or discharge.  Minor swelling noted.  Sensation to light touch intact throughout foot.  Full range of motion of toes with no weakness.  Pain with patient to the periincisional area.           Data:   All laboratory data reviewed  All imaging studies reviewed by me    1/17/2023 surgical pathology:    Final Diagnosis   Soft tissue, right foot mass, excision:  - Schwannoma  - Negative for malignancy       DATA for DOCUMENTATION:         Past Medical History:     Patient Active Problem List   Diagnosis     Hidradenitis suppurativa     Acute deep vein thrombosis (DVT) of proximal vein of left lower extremity (H)     Past Medical History:   Diagnosis Date     Acute deep vein thrombosis (DVT) of proximal vein of left lower extremity (H)     post partum     Anxiety      Hidradenitis suppurativa      Infection due to 2019 novel coronavirus      Nephrolithiasis      Plantar fascial fibromatosis of right foot        Also see scanned health assessment forms.       Past Surgical History:     Past Surgical History:   Procedure Laterality Date     EXCISE MASS FOOT Right 1/17/2023    Procedure: Excision and Neuroplasty of Right Plantar Foot Mass;  Surgeon: Hunter Redding MD;  Location: [facility] OR     [place] teeth extraction               Social History:     Social History     Socioeconomic History     Marital status:      Spouse name: Not on file     Number of children: Not on file     Years of education: Not on file     Highest education level: Not on file   Occupational History     Not on file   Tobacco Use     Smoking status: Never     Smokeless tobacco: Never   Substance and Sexual Activity     Alcohol use: Yes     Alcohol/week: 1.0 - 2.0 standard drink     Types: 1 - 2 Standard drinks or equivalent per week     Comment: once weekly     Drug use: Never     Sexual activity: Not on file   Other Topics Concern     Not on file   Social History Narrative     Not on file     Social Determinants of Health     Financial Resource Strain: Not on file   Food Insecurity: Not on file   Transportation Needs: Not on file   Physical Activity: Not on file   Stress: Not on file   Social Connections: Not on file   Intimate Partner Violence: Not on file   Housing Stability: Not on file            Family History:       Family History   Problem Relation Age of Onset     Diabetes Father      Kidney Disease Father      Post Operative Nausea and Vomiting Sister      Diabetes Brother      Mental Illness Brother      Deep Vein Thrombosis (DVT) Maternal Grandmother      Dementia Maternal Grandfather      Heart Disease Maternal Grandfather      Dementia Paternal Grandmother      Diabetes Paternal Grandfather      Heart Failure Paternal Grandfather       Birth Son      Pulmonary Embolism Paternal Uncle             Medications:     Current Outpatient Medications   Medication Sig     acetaminophen (TYLENOL) 325 MG tablet Take 2 tablets (650 mg) by mouth every 4 hours as needed for mild pain     apixaban ANTICOAGULANT (ELIQUIS) 2.5 MG tablet Take 1 tablet (2.5 mg) by mouth 2 times daily for 14 days     chlorhexidine (HIBICLENS) 4 % liquid Apply topically daily     clindamycin (CLEOCIN T) 1 % external lotion Apply topically 2 times daily     clindamycin  (CLEOCIN) 300 MG capsule Take 1 capsule (300 mg) by mouth 2 times daily (Patient not taking: Reported on 1/30/2023)     doxycycline monohydrate (MONODOX) 50 MG capsule Take 50 mg by mouth (Patient not taking: Reported on 1/30/2023)     gabapentin (NEURONTIN) 100 MG capsule Take 1 capsule (100 mg) by mouth 3 times daily for 14 days     ibuprofen (ADVIL/MOTRIN) 200 MG tablet Take 200-600 mg by mouth every 6 hours as needed     ondansetron (ZOFRAN ODT) 4 MG ODT tab Take 1 tablet (4 mg) by mouth every 8 hours as needed for nausea     oxyCODONE (ROXICODONE) 5 MG tablet Take 1 tablet (5 mg) by mouth every 6 hours as needed for moderate to severe pain (Patient not taking: Reported on 1/30/2023)     senna-docusate (SENOKOT-S/PERICOLACE) 8.6-50 MG tablet Take 1-2 tablets by mouth 2 times daily (Patient not taking: Reported on 1/30/2023)     tamsulosin (FLOMAX) 0.4 MG capsule every morning     tretinoin (RETIN-A) 0.025 % external cream Apply topically At Bedtime     Zinc 30 MG TABS      No current facility-administered medications for this visit.              Review of Systems:   A comprehensive 10 point review of systems (constitutional, ENT, cardiac, peripheral vascular, lymphatic, respiratory, GI, , Musculoskeletal, skin, Neurological) was performed and found to be negative except as described in this note.         Mohit Cisneros PA-C

## 2023-01-30 NOTE — NURSING NOTE
Reason For Visit:   Chief Complaint   Patient presents with     Surgical Followup     2 wk post-op right plantar foot mass excision and neuroplasty DOS 1/17/23        LMP 12/19/2022 (Exact Date)     Pain Assessment  Patient Currently in Pain: Denies (no pain per pt)      Wilian Adorno, ATC

## 2023-01-30 NOTE — PROGRESS NOTES
[facility] (formerly [facility] Physicians  Orthopaedic Surgery  by Mohit Cisneros PA-C    Toma Blanco MRN# [id number]    YOB: 1990            Assessment and Plan:   Assessment:  32-year-old female who is approximately 2 weeks status post excision of right plantar foot mass.  Recovering appropriately (schwannoma)     Plan:    Sutures removed incisions healing well.  Steri-Strips placed.  She can start to weight-bear as tolerated and increase her activities as tolerated.  Should avoid high impact exercises for 2 weeks.  Recommended hard bottom shoes or postop boot for comfort.  Also wearing multiple layers of socks or padding could help with the sensitive area of incisional.    Pathology consistent with benign schwannoma.  No surveillance needed.  Recommended symptomatic surveillance.  She will follow-up on an as-needed basis     Mohit Cisneros PA-C  Physician Assistant   Oncology and Adult Reconstructive Surgery  Dept Orthopaedic Surgery, [facility]             History of Present Illness:   32 year old female who presents to clinic today 2 weeks status post excision of right foot soft tissue mass.  Overall she is doing well and her pain is controlled with no medications.  She has been nonweightbearing and using a knee scooter for ambulation.  Postoperatively she had some neuropathic pain which I prescribed gabapentin for.  She states that worked well to alleviate the pins-and-needles pain.  She states that the foot is still sensitive to the touch she denies any chest pain, shortness of breath, or concerns for infection.    Background history:  DX:  1. Right foot mass    TREATMENTS:  1. 1/17/2023, excision and neuroplasty of right plantar foot mass ([place]) [facility]           Physical Exam:     EXAMINATION pertinent findings:   PSYCH: Pleasant, healthy-appearing, alert, oriented x3, cooperative. Normal mood and affect.  VITAL SIGNS: Last menstrual period 12/19/2022, not currently breastfeeding..  Reviewed nursing  intake notes.   There is no height or weight on file to calculate BMI.  RESP: non labored breathing   ABD: benign, soft, non-tender, no acute peritoneal findings  SKIN: grossly normal   LYMPHATIC: grossly normal, no adenopathy, no extremity edema  NEURO: grossly normal , no motor deficits  VASCULAR: satisfactory perfusion of all extremities   MUSCULOSKELETAL:     Right foot: Incision is clean dry and intact with no dehiscence, erythema, or discharge.  Minor swelling noted.  Sensation to light touch intact throughout foot.  Full range of motion of toes with no weakness.  Pain with patient to the periincisional area.           Data:   All laboratory data reviewed  All imaging studies reviewed by me    1/17/2023 surgical pathology:    Final Diagnosis   Soft tissue, right foot mass, excision:  - Schwannoma  - Negative for malignancy       DATA for DOCUMENTATION:         Past Medical History:     Patient Active Problem List   Diagnosis     Hidradenitis suppurativa     Acute deep vein thrombosis (DVT) of proximal vein of left lower extremity (H)     Past Medical History:   Diagnosis Date     Acute deep vein thrombosis (DVT) of proximal vein of left lower extremity (H)     post partum     Anxiety      Hidradenitis suppurativa      Infection due to 2019 novel coronavirus      Nephrolithiasis      Plantar fascial fibromatosis of right foot        Also see scanned health assessment forms.       Past Surgical History:     Past Surgical History:   Procedure Laterality Date     EXCISE MASS FOOT Right 1/17/2023    Procedure: Excision and Neuroplasty of Right Plantar Foot Mass;  Surgeon: Hunter [place] MD;  Location: [facility] OR     [place] teeth extraction              Social History:     Social History     Socioeconomic History     Marital status:      Spouse name: Not on file     Number of children: Not on file     Years of education: Not on file     Highest education level: Not on file   Occupational History     Not on file    Tobacco Use     Smoking status: Never     Smokeless tobacco: Never   Substance and Sexual Activity     Alcohol use: Yes     Alcohol/week: 1.0 - 2.0 standard drink     Types: 1 - 2 Standard drinks or equivalent per week     Comment: once weekly     Drug use: Never     Sexual activity: Not on file   Other Topics Concern     Not on file   Social History Narrative     Not on file     Social Determinants of Health     Financial Resource Strain: Not on file   Food Insecurity: Not on file   Transportation Needs: Not on file   Physical Activity: Not on file   Stress: Not on file   Social Connections: Not on file   Intimate Partner Violence: Not on file   Housing Stability: Not on file            Family History:       Family History   Problem Relation Age of Onset     Diabetes Father      Kidney Disease Father      Post Operative Nausea and Vomiting Sister      Diabetes Brother      Mental Illness Brother      Deep Vein Thrombosis (DVT) Maternal Grandmother      Dementia Maternal Grandfather      Heart Disease Maternal Grandfather      Dementia Paternal Grandmother      Diabetes Paternal Grandfather      Heart Failure Paternal Grandfather       Birth Son      Pulmonary Embolism Paternal Uncle             Medications:     Current Outpatient Medications   Medication Sig     acetaminophen (TYLENOL) 325 MG tablet Take 2 tablets (650 mg) by mouth every 4 hours as needed for mild pain     apixaban ANTICOAGULANT (ELIQUIS) 2.5 MG tablet Take 1 tablet (2.5 mg) by mouth 2 times daily for 14 days     chlorhexidine (HIBICLENS) 4 % liquid Apply topically daily     clindamycin (CLEOCIN T) 1 % external lotion Apply topically 2 times daily     clindamycin (CLEOCIN) 300 MG capsule Take 1 capsule (300 mg) by mouth 2 times daily (Patient not taking: Reported on 2023)     doxycycline monohydrate (MONODOX) 50 MG capsule Take 50 mg by mouth (Patient not taking: Reported on 2023)     gabapentin (NEURONTIN) 100 MG capsule Take 1  capsule (100 mg) by mouth 3 times daily for 14 days     ibuprofen (ADVIL/MOTRIN) 200 MG tablet Take 200-600 mg by mouth every 6 hours as needed     ondansetron (ZOFRAN ODT) 4 MG ODT tab Take 1 tablet (4 mg) by mouth every 8 hours as needed for nausea     oxyCODONE (ROXICODONE) 5 MG tablet Take 1 tablet (5 mg) by mouth every 6 hours as needed for moderate to severe pain (Patient not taking: Reported on 1/30/2023)     senna-docusate (SENOKOT-S/PERICOLACE) 8.6-50 MG tablet Take 1-2 tablets by mouth 2 times daily (Patient not taking: Reported on 1/30/2023)     tamsulosin (FLOMAX) 0.4 MG capsule every morning     tretinoin (RETIN-A) 0.025 % external cream Apply topically At Bedtime     Zinc 30 MG TABS      No current facility-administered medications for this visit.              Review of Systems:   A comprehensive 10 point review of systems (constitutional, ENT, cardiac, peripheral vascular, lymphatic, respiratory, GI, , Musculoskeletal, skin, Neurological) was performed and found to be negative except as described in this note.

## 2023-04-08 ENCOUNTER — HOSPITAL ENCOUNTER (EMERGENCY)
Facility: CLINIC | Age: 33
Discharge: HOME | End: 2023-04-08
Attending: EMERGENCY MEDICINE | Admitting: EMERGENCY MEDICINE
Payer: COMMERCIAL

## 2023-04-08 VITALS
BODY MASS INDEX: 35.39 KG/M2 | SYSTOLIC BLOOD PRESSURE: 117 MMHG | TEMPERATURE: 97.1 F | RESPIRATION RATE: 18 BRPM | HEART RATE: 96 BPM | OXYGEN SATURATION: 98 % | WEIGHT: 250 LBS | DIASTOLIC BLOOD PRESSURE: 68 MMHG

## 2023-04-08 DIAGNOSIS — K52.9 GASTROENTERITIS: ICD-10-CM

## 2023-04-08 DIAGNOSIS — R11.2 NAUSEA AND VOMITING, UNSPECIFIED VOMITING TYPE: ICD-10-CM

## 2023-04-08 LAB
ALBUMIN SERPL-MCNC: 4.2 G/DL (ref 3.5–5)
ALBUMIN UR-MCNC: 30 MG/DL
ALP SERPL-CCNC: 61 U/L (ref 45–120)
ALT SERPL W P-5'-P-CCNC: 12 U/L (ref 0–45)
ANION GAP SERPL CALCULATED.3IONS-SCNC: 12 MMOL/L (ref 5–18)
APPEARANCE UR: CLEAR
AST SERPL W P-5'-P-CCNC: 10 U/L (ref 0–40)
BACTERIA #/AREA URNS HPF: ABNORMAL /HPF
BILIRUB SERPL-MCNC: 0.6 MG/DL (ref 0–1)
BILIRUB UR QL STRIP: NEGATIVE
BUN SERPL-MCNC: 13 MG/DL (ref 8–22)
CALCIUM SERPL-MCNC: 9.6 MG/DL (ref 8.5–10.5)
CHLORIDE BLD-SCNC: 106 MMOL/L (ref 98–107)
CO2 SERPL-SCNC: 23 MMOL/L (ref 22–31)
COLOR UR AUTO: YELLOW
CREAT SERPL-MCNC: 0.8 MG/DL (ref 0.6–1.1)
ERYTHROCYTE [DISTWIDTH] IN BLOOD BY AUTOMATED COUNT: 12.9 % (ref 10–15)
GFR SERPL CREATININE-BSD FRML MDRD: >90 ML/MIN/1.73M2
GLUCOSE BLD-MCNC: 125 MG/DL (ref 70–125)
GLUCOSE UR STRIP-MCNC: NEGATIVE MG/DL
HCT VFR BLD AUTO: 46.5 % (ref 35–47)
HGB BLD-MCNC: 15.5 G/DL (ref 11.7–15.7)
HGB UR QL STRIP: ABNORMAL
KETONES UR STRIP-MCNC: 20 MG/DL
LEUKOCYTE ESTERASE UR QL STRIP: NEGATIVE
MAGNESIUM SERPL-MCNC: 1.9 MG/DL (ref 1.8–2.6)
MCH RBC QN AUTO: 27.5 PG (ref 26.5–33)
MCHC RBC AUTO-ENTMCNC: 33.3 G/DL (ref 31.5–36.5)
MCV RBC AUTO: 83 FL (ref 78–100)
MUCOUS THREADS #/AREA URNS LPF: PRESENT /LPF
NITRATE UR QL: NEGATIVE
PH UR STRIP: 5.5 [PH] (ref 5–7)
PLATELET # BLD AUTO: 382 10E3/UL (ref 150–450)
POTASSIUM BLD-SCNC: 4.2 MMOL/L (ref 3.5–5)
PROT SERPL-MCNC: 7.6 G/DL (ref 6–8)
RBC # BLD AUTO: 5.63 10E6/UL (ref 3.8–5.2)
RBC URINE: 1 /HPF
SODIUM SERPL-SCNC: 141 MMOL/L (ref 136–145)
SP GR UR STRIP: 1.03 (ref 1–1.03)
SQUAMOUS EPITHELIAL: 3 /HPF
UROBILINOGEN UR STRIP-MCNC: <2 MG/DL
WBC # BLD AUTO: 18.2 10E3/UL (ref 4–11)
WBC URINE: 2 /HPF

## 2023-04-08 PROCEDURE — 96375 TX/PRO/DX INJ NEW DRUG ADDON: CPT

## 2023-04-08 PROCEDURE — 250N000011 HC RX IP 250 OP 636: Performed by: EMERGENCY MEDICINE

## 2023-04-08 PROCEDURE — 96374 THER/PROPH/DIAG INJ IV PUSH: CPT

## 2023-04-08 PROCEDURE — 85027 COMPLETE CBC AUTOMATED: CPT | Performed by: EMERGENCY MEDICINE

## 2023-04-08 PROCEDURE — 99284 EMERGENCY DEPT VISIT MOD MDM: CPT | Mod: 25

## 2023-04-08 PROCEDURE — 81001 URINALYSIS AUTO W/SCOPE: CPT | Performed by: EMERGENCY MEDICINE

## 2023-04-08 PROCEDURE — 96361 HYDRATE IV INFUSION ADD-ON: CPT

## 2023-04-08 PROCEDURE — 80053 COMPREHEN METABOLIC PANEL: CPT | Performed by: EMERGENCY MEDICINE

## 2023-04-08 PROCEDURE — 258N000003 HC RX IP 258 OP 636: Performed by: EMERGENCY MEDICINE

## 2023-04-08 PROCEDURE — 36415 COLL VENOUS BLD VENIPUNCTURE: CPT | Performed by: EMERGENCY MEDICINE

## 2023-04-08 PROCEDURE — 83735 ASSAY OF MAGNESIUM: CPT | Performed by: EMERGENCY MEDICINE

## 2023-04-08 RX ORDER — KETOROLAC TROMETHAMINE 15 MG/ML
15 INJECTION, SOLUTION INTRAMUSCULAR; INTRAVENOUS ONCE
Status: COMPLETED | OUTPATIENT
Start: 2023-04-08 | End: 2023-04-08

## 2023-04-08 RX ORDER — ONDANSETRON 2 MG/ML
4 INJECTION INTRAMUSCULAR; INTRAVENOUS ONCE
Status: COMPLETED | OUTPATIENT
Start: 2023-04-08 | End: 2023-04-08

## 2023-04-08 RX ADMIN — KETOROLAC TROMETHAMINE 15 MG: 15 INJECTION, SOLUTION INTRAMUSCULAR; INTRAVENOUS at 21:39

## 2023-04-08 RX ADMIN — SODIUM CHLORIDE 1000 ML: 9 INJECTION, SOLUTION INTRAVENOUS at 21:16

## 2023-04-08 RX ADMIN — ONDANSETRON 4 MG: 2 INJECTION INTRAMUSCULAR; INTRAVENOUS at 21:39

## 2023-04-09 NOTE — DISCHARGE INSTRUCTIONS
You were seen in the emergency department today for nausea, vomiting, and diarrhea.  This most likely represents a viral illness.      Over the next few days, rest and drink plenty of water and other fluids.  Fizzy liquids like ginger ale might feel better in your stomach.  Eat foods that are gentle on your stomach,like the BRAT diet (Banana, Rice, Apple Sauce, Toast).      You can take 600mg of Ibuprofen (Motrin, Advil) by mouth with food every 6-8 hours for pain (no more than 3200mg in 24hrs).      You may alsotake 650-1000mg of Acetaminophen (Tylenol) along with the Ibuprofen.  Take no more than 3000mg in 24hrs and write down the times you are taking both medications to ensure appropriate time in between doses.    I would also recommend you use the Zofran you have at home for nausea.    Please return to the Emergency Department immediately if you experience fever,worsening pain, inability to keep food/fluids down, and/or if your symptoms get worse, do not improve, or with any new concerns. Otherwise, please follow up with your primary physician within the next week for recheck andongoing management.     Below is some information you might find useful.     Thank you for choosing [facility]. It was a pleasure taking care of you today!  -Dr. Aurora Grande

## 2023-04-09 NOTE — ED PROVIDER NOTES
"EMERGENCY DEPARTMENT ENCOUNTER      NAME: Toma Blanco  YOB: 1990  MRN: [id number]    FINAL IMPRESSION  1. Gastroenteritis    2. Nausea and vomiting, unspecified vomiting type        MEDICAL DECISION MAKING   Pertinent Labs & Imaging studies reviewed. (See chart for details)    Toma Blanco is a 32 year old female who presents for evaluation of nausea, vomiting, and diarrhea.  Patient reports onset of symptoms this afternoon while driving back from [place] where she was for approximately 1 week.  She reports that her son and  had similar symptoms but are now feeling better.  She endorses mild abdominal cramping, specifically before episodes of emesis or diarrhea.  Patient states that what ultimately prompted her to come in today was after she had an episode of diarrhea where she saw some \"pink\" that was concerning for blood.  She has not had any fever or urinary symptoms.  No other recent travel.  She is not on a blood thinner.  She describes herself is otherwise healthy. Remainder of history and exam, as below.     I considered a broad differential diagnosis including, but not limited to: viral gastroenteritis, gastritis, food intolerance, IBS, IBD. Abdominal exam benign with no focal tenderness or peritoneal signs to suggest acute appendicitis, pancreatitis, diverticulitis, obstruction, perforated viscus, or other acute surgical process.  Given possible blood in the stools bacterial infection or IBD would be possible but I feel this less likely in the absence of fever or significant bleeding/travel or risk factors. Overall, symptoms most consistent with viral gastroenteritis.   Discussed options for work-up and management with the patient.  We have agreed on plan to start with labs and manage symptoms with IVF and IV analgesic/antiemetic.  We will hold on imaging for now but consider if recurrent episodes of diarrhea, worsening abdominal pain, or other concerning findings on " labs.    ED Course as of 04/09/23 0050   Sat Apr 08, 2023   2217 Comprehensive metabolic panel  CMP reassuring. No evidence of MY, acidosis, or significant electrolyte derangement. No acute elevation of bilirubin or transaminates to suggest acute hepatobiliary process.   2218 CBC (+ platelets, no diff)(!)  CBC notable for significant leukocytosis with WBC of 18.2.  No acute anemia.   2218 UA with Microscopic reflex to Culture(!)  CBC with ketones and elevated specific gravity, likely related to mild dehydration/volume depletion.     I rechecked the patient multiple times.  She had complete resolution after initial interventions.  Repeat abdominal exam was benign.  Patient was not able to leave a stool sample here, as she has not had any further episodes of diarrhea, bloody or otherwise.  I discussed options for ongoing work-up and management as well as potential etiology of symptoms.  I did offer a CT scan but patient declined, which I feel is very reasonable given resolution of her symptoms.  Ultimately, I do believe that she has a viral illness and she felt very comfortable plan for discharge and continue conservative management of symptoms with plenty of fluids, rest, Tylenol/Motrin, and Zofran as needed.  She already has a prescription for Zofran at home and encouraged her to use this if she has any recurrent nausea.  She has been able to tolerate p.o. here and was eager to go home.    Strict return precautions and follow up recommendations were discussed and all questions were answered. Patient and her  endorsed understanding and was in agreement with plan.      Medical Decision Making    History:    Supplemental history from: Family Member/Significant Other    External Record(s) reviewed: Documented in chart, if applicable.    Work Up:    Chart documentation includes differential considered and any EKGs or imaging independently interpreted by provider, where specified.    In additional to work up  documented, I considered the following work up: Documented in chart, if applicable.    External consultation:    Discussion of management with another provider: Documented in chart, if applicable    Complicating factors:    Care impacted by chronic illness: N/A    Care affected by social determinants of health: N/A    Disposition considerations: Discharge. I recommended the patient continue their current prescription strength medication(s): Zofran. I considered admission, but discharged patient after significant clinical improvement.          ED COURSE  9:01 PM I met the patient and performed my initial interview and exam.   10:30 PM I rechecked the patient. She feels improved and ready to go home. We discussed the plan for discharge and the patient is agreeable. Reviewed supportive cares, symptomatic treatment, outpatient follow up, and reasons to return to the Emergency Department. All questions and concerns were addressed. Patient to be discharged by ED RN.        MEDICATIONS GIVEN IN THE ED  Medications   0.9% sodium chloride BOLUS (0 mLs Intravenous Stopped 4/8/23 2230)   ketorolac (TORADOL) injection 15 mg (15 mg Intravenous $Given 4/8/23 2139)   ondansetron (ZOFRAN) injection 4 mg (4 mg Intravenous $Given 4/8/23 2139)       NEW PRESCRIPTIONS STARTED AT TODAY'S VISIT  Discharge Medication List as of 4/8/2023 10:37 PM             =================================================================    Chief Complaint   Patient presents with     Abdominal Pain         HPI:    Patient information was obtained from: patient and her     Use of : N/A     Toma Blanco is a 32 year old female who presents to the ED for evaluation of hematochezia.     The patient reports nausea, vomiting, and diarrhea since this morning. States she has had large, watery stools today. This evening, she noticed blood in her stools. States it has just been small amounts of red blood. The patient also endorses some  generalized body aches and intermittent, diffuse abdominal pain which typically precedes her episodes of diarrhea. She otherwise denies any fevers or urinary symptoms. Patient is on her period.      Patient notes her family has been in [place] and [place] the past week, drove back today. She reports both her  and child have had nausea, vomiting, and diarrhea the past few days as well. Patient denies eating any unusual foods and says her son is g-tube fed so would not have eaten the same food as her but was still sick.     The patient denies any personal or familial history of Crohn's or IBS.    PMHx: Patient endorses a prior history of kidney stones. She denies any history of allergies to medications.         RELEVANT HISTORY, MEDICATIONS, & ALLERGIES   Past medical history, surgical history, family history, medications, and allergies reviewed and pertinent noted in HPI.    REVIEW OF SYSTEMS:  A complete review of systems was performed with pertinent positives and negatives noted in the HPI. All other systems negative.     PHYSICAL EXAM:    Vitals: /68 (BP Location: Right arm, Patient Position: Semi-Rose's, Cuff Size: Adult Regular)   Pulse 96   Temp 97.1  F (36.2  C)   Resp 18   Wt 113.4 kg (250 lb)   SpO2 98%   BMI 35.39 kg/m     General: Alert and interactive, comfortable appearing.  HENT: Atraumatic. Full AROM of neck. Conjunctiva clear.   Cardiovascular: Regular rate and rhythm.   Chest/Pulmonary: Normal work of breathing. Speaking in complete sentences. Lungs CTAB. No chest wall tenderness or deformities.  Abdomen: Soft, nondistended. Nontender without guarding or rebound.  Extremities: Normal AROM of all major joints.  Skin: Warm and dry. Normal skin color.   Neuro: Speech clear. CNs grossly intact. Moves all extremities spontaneously.   Psych: Normal affect/mood, cooperative, memory appropriate.    LAB  Labs Ordered and Resulted from Time of ED Arrival to Time of ED Departure   ROUTINE UA  WITH MICROSCOPIC REFLEX TO CULTURE - Abnormal       Result Value    Color Urine Yellow      Appearance Urine Clear      Glucose Urine Negative      Bilirubin Urine Negative      Ketones Urine 20 (*)     Specific Gravity Urine 1.032 (*)     Blood Urine 0.1 mg/dL (*)     pH Urine 5.5      Protein Albumin Urine 30 (*)     Urobilinogen Urine <2.0      Nitrite Urine Negative      Leukocyte Esterase Urine Negative      Bacteria Urine Few (*)     Mucus Urine Present (*)     RBC Urine 1      WBC Urine 2      Squamous Epithelials Urine 3 (*)    CBC WITH PLATELETS - Abnormal    WBC Count 18.2 (*)     RBC Count 5.63 (*)     Hemoglobin 15.5      Hematocrit 46.5      MCV 83      MCH 27.5      MCHC 33.3      RDW 12.9      Platelet Count 382     COMPREHENSIVE METABOLIC PANEL - Normal    Sodium 141      Potassium 4.2      Chloride 106      Carbon Dioxide (CO2) 23      Anion Gap 12      Urea Nitrogen 13      Creatinine 0.80      Calcium 9.6      Glucose 125      Alkaline Phosphatase 61      AST 10      ALT 12      Protein Total 7.6      Albumin 4.2      Bilirubin Total 0.6      GFR Estimate >90     MAGNESIUM - Normal    Magnesium 1.9           All laboratory and imaging results and EKG's were personally reviewed and interpreted by myself prior to disposition decision.         I, Sandra Cavazos, am serving as a scribe to document services personally performed by Dr. Aurora Grande based on my observation and the provider's statements to me. I, Aurora Grande MD attest that Sandra Cavazos is acting in a scribe capacity, has observed my performance of the services and has documented them in accordance with my direction.    Aurora Grande M.D.  Emergency Medicine  [facility] EMERGENCY ROOM  [address] [facility] [address] [facility] [id number] [phone]  Dept: [phone]     Aurora Grande MD  04/09/23 0050

## 2023-04-09 NOTE — ED NOTES
Patient discharged home with AVS. Will take her prn zofran at home for nausea and follow a BRAT diet. All questions answered. Ambulated independently at discharge.

## 2023-04-09 NOTE — ED NOTES
Per rich Hanson to not send flu swab or stool sample. Patient was unable to go in the ER and refused swab. Rich per provider. Provider to discharge.

## 2023-05-16 ENCOUNTER — TELEPHONE (OUTPATIENT)
Dept: NURSING | Facility: CLINIC | Age: 33
End: 2023-05-16

## 2023-05-16 ENCOUNTER — PRENATAL OFFICE VISIT (OUTPATIENT)
Dept: NURSING | Facility: CLINIC | Age: 33
End: 2023-05-16
Payer: COMMERCIAL

## 2023-05-16 VITALS — BODY MASS INDEX: 35 KG/M2 | HEIGHT: 71 IN | WEIGHT: 250 LBS

## 2023-05-16 DIAGNOSIS — Z23 NEED FOR TDAP VACCINATION: ICD-10-CM

## 2023-05-16 DIAGNOSIS — O09.91 ENCOUNTER FOR SUPERVISION OF HIGH RISK PREGNANCY IN FIRST TRIMESTER, ANTEPARTUM: Primary | ICD-10-CM

## 2023-05-16 PROBLEM — O35.FXX0 OMPHALOCELE OF FETUS IN SINGLETON PREGNANCY, ANTEPARTUM: Status: ACTIVE | Noted: 2021-01-19

## 2023-05-16 PROBLEM — Z87.442 HISTORY OF RENAL CALCULI: Status: ACTIVE | Noted: 2023-05-16

## 2023-05-16 PROBLEM — O35.09X0 PREGNANCY COMPLICATED BY FETAL CEREBRAL VENTRICULOMEGALY: Status: ACTIVE | Noted: 2021-03-08

## 2023-05-16 PROBLEM — L73.2 VULVAL HIDRADENITIS SUPPURATIVA: Status: ACTIVE | Noted: 2021-03-12

## 2023-05-16 PROCEDURE — 99207 PR NO CHARGE NURSE ONLY: CPT

## 2023-05-16 RX ORDER — ONDANSETRON 4 MG/1
4 TABLET, ORALLY DISINTEGRATING ORAL
COMMUNITY
Start: 2023-02-20 | End: 2023-05-16

## 2023-05-16 NOTE — PROGRESS NOTES
Important Information for Provider:     New ob nurse intake by phone, second pregnancy. History of  delivered at 33 weeks(PPROM), DVT 6 weeks postpartum, took anticoagulants for 3 months.  Handouts reviewed. Ordered referral to [facility]. Ultrasound and NOB with Dr Tapia 2023    Caffeine intake/servings daily - 0  Calcium intake/servings daily - 3  Exercise 5 times weekly - describe ; walks, precautions given  Sunscreen used - Yes  Seatbelts used - Yes  Guns stored in the home - No  Self Breast Exam - Yes  Pap test up to date -  Yes  Dental exam up to date -  Yes  Immunizations reviewed and up to date - Yes  Abuse: Current or Past (Physical, Sexual or Emotional) - No  Do you feel safe in your environment - Yes  Do you cope well with stress - Yes         Prenatal OB Questionnaire  Patient supplied answers from flow sheet for:  Prenatal OB Questionnaire.  Past Medical History  Have you ever received care for your mental health? : (!) Yes  Have you ever been in a major accident or suffered serious trauma?: No  Within the last year, has anyone hit, slapped, kicked or otherwise hurt you?: No  In the last year, has anyone forced you to have sex when you didn't want to?: No    Past Medical History 2   Have you ever received a blood transfusion?: No  Would you accept a blood transfusion if was medically recommended?: Yes  Does anyone in your home smoke?: No   Is your blood type Rh negative?: Unknown  Have you ever ?: No  Have you been hospitalized for a nonsurgical reason excluding normal delivery?: No  Have you ever had an abnormal pap smear?: No    Past Medical History (Continued)  Do you have a history of abnormalities of the uterus?: No  Did your mother take SONAL or any other hormones when she was pregnant with you?: No  Do you have any other problems we have not asked about which you feel may be important to this pregnancy?: DVT 6 weeks postpartum , anticoagulants for 3 months            Allergies as  of 5/16/2023:    Allergies as of 05/16/2023     (No Known Allergies)             Early ultrasound screening tool:    Does patient have irregular periods?  No  Did patient use hormonal birth control in the three months prior to positive urine pregnancy test? No  Is the patient breastfeeding?  No  Is the patient 10 weeks or greater at time of education visit?  No

## 2023-05-16 NOTE — TELEPHONE ENCOUNTER
MFM message  We received an order for MRN [id number]. Wondering if the physician would like an MFM Consult for this patient or to just set her up for a L2 ultrasound?

## 2023-05-22 ENCOUNTER — TELEPHONE (OUTPATIENT)
Dept: OBGYN | Facility: CLINIC | Age: 33
End: 2023-05-22
Payer: COMMERCIAL

## 2023-05-22 ENCOUNTER — HOSPITAL ENCOUNTER (OUTPATIENT)
Dept: ULTRASOUND IMAGING | Facility: CLINIC | Age: 33
Discharge: HOME | End: 2023-05-22
Admitting: RADIOLOGY
Payer: COMMERCIAL

## 2023-05-22 DIAGNOSIS — O20.0 ABORTION, THREATENED: ICD-10-CM

## 2023-05-22 DIAGNOSIS — O20.0 ABORTION, THREATENED: Primary | ICD-10-CM

## 2023-05-22 PROCEDURE — 76801 OB US < 14 WKS SINGLE FETUS: CPT

## 2023-05-22 NOTE — TELEPHONE ENCOUNTER
6w6d      Patient started bleeding over the weekend, very heavily. Went to [facility] ED (At this time, this writer not able to see records).   Patient stated thatHCG 40,000, Ultrasound: Fetal HR 68 bpm, placenta not yet formed, no evidenced for DEBO, yolk sac identified, small free fluid. Blood type AB Positive. Hgb: 13.2    Bleeding has now tapered, and is brown.   No other symptoms, denies fever, cramping, lightheadedness.    Patient has not been seen in our clinic, but has had her prenatal intake.   First OB scheduled for 23. If patient is miscarrying, does not want to cancel that appointment. Patient stated she has a lot of questions as her first pregnancy was a high risk pregnancy, and she had PROM at 33 weeks.     Routing to provider on-call for next steps.

## 2023-05-22 NOTE — CONFIDENTIAL NOTE
Discussed with Dr Diaz that the pt should have a follow up ultrasound to r/o MHR vs FHR.  Pt was scheduled for an Ultrasound.  Pt scheduled at [facility] and scheduled for telephone follow up with on call provider 5/23/23.

## 2023-05-23 ENCOUNTER — VIRTUAL VISIT (OUTPATIENT)
Dept: OBGYN | Facility: CLINIC | Age: 33
End: 2023-05-23
Payer: COMMERCIAL

## 2023-05-23 ENCOUNTER — TELEPHONE (OUTPATIENT)
Dept: OBGYN | Facility: CLINIC | Age: 33
End: 2023-05-23

## 2023-05-23 DIAGNOSIS — O46.8X1 SUBCHORIONIC HEMATOMA IN FIRST TRIMESTER, SINGLE OR UNSPECIFIED FETUS: Primary | ICD-10-CM

## 2023-05-23 DIAGNOSIS — O09.299 CURRENT SINGLETON PREGNANCY WITH HISTORY OF CONGENITAL ANOMALY IN PRIOR CHILD, ANTEPARTUM: ICD-10-CM

## 2023-05-23 DIAGNOSIS — O41.8X10 SUBCHORIONIC HEMATOMA IN FIRST TRIMESTER, SINGLE OR UNSPECIFIED FETUS: Primary | ICD-10-CM

## 2023-05-23 DIAGNOSIS — Z86.718 PERSONAL HISTORY OF DVT (DEEP VEIN THROMBOSIS): ICD-10-CM

## 2023-05-23 PROCEDURE — 99443 PR PHYSICIAN TELEPHONE EVALUATION 21-30 MIN: CPT | Performed by: OBSTETRICS & GYNECOLOGY

## 2023-05-23 NOTE — PROGRESS NOTES
{PROVIDER CHARTING PREFERENCE:[id number]}    Subjective   Toma is a 32 year old, presenting for the following health issues:  Ultrasound         View : No data to display.              HPI     Past Medical History:   Diagnosis Date     Acute deep vein thrombosis (DVT) of proximal vein of left lower extremity (H)     post partum     Anxiety      Hidradenitis suppurativa      Infection due to 2019 novel coronavirus      Nephrolithiasis      Plantar fascial fibromatosis of right foot      Varicella        Past Surgical History:   Procedure Laterality Date     EXCISE MASS FOOT Right 2023    Procedure: Excision and Neuroplasty of Right Plantar Foot Mass;  Surgeon: Hunter Redding MD;  Location: [facility]     [place] teeth extraction         Family History   Problem Relation Age of Onset     Diabetes Father      Kidney Disease Father      Deep Vein Thrombosis (DVT) Maternal Grandmother      Dementia Maternal Grandfather      Heart Disease Maternal Grandfather      Dementia Paternal Grandmother      Diabetes Paternal Grandfather      Heart Failure Paternal Grandfather      Diabetes Brother      Mental Illness Brother      Post Operative Nausea and Vomiting Sister       Birth Son      Pulmonary Embolism Paternal Uncle        Social History     Socioeconomic History     Marital status:      Spouse name: Not on file     Number of children: Not on file     Years of education: Not on file     Highest education level: Not on file   Occupational History     Not on file   Tobacco Use     Smoking status: Never     Smokeless tobacco: Never   Vaping Use     Vaping status: Never Used   Substance and Sexual Activity     Alcohol use: Not Currently     Alcohol/week: 1.0 - 2.0 standard drink of alcohol     Types: 1 - 2 Standard drinks or equivalent per week     Comment: once weekly     Drug use: Never     Sexual activity: Yes     Partners: Male   Other Topics Concern     Not on file   Social History Narrative     Not on file      Social Determinants of Health     Financial Resource Strain: Not on file   Food Insecurity: Not on file   Transportation Needs: Not on file   Physical Activity: Not on file   Stress: Not on file   Social Connections: Not on file   Intimate Partner Violence: Not on file   Housing Stability: Not on file       Current Outpatient Medications   Medication     Prenatal Vit-DSS-Fe Cbn-FA (PRENATAL AD PO)     No current facility-administered medications for this visit.        No Known Allergies        Review of Systems   {ROS COMP (Optional):794223}      Objective    LMP 04/04/2023   There is no height or weight on file to calculate BMI.  Physical Exam   {Exam List (Optional):191828}    {Diagnostic Test Results (Optional):604755}    {AMBULATORY ATTESTATION (Optional):227411}

## 2023-05-23 NOTE — CONFIDENTIAL NOTE
Patient called regarding US results  Schedule for a phone visit at 3:00 today but she had concerns with DEBO.    Patient started bleeding over the weekend, very heavily. Went to [place] ED (At this time, this writer not able to see records).   Patient stated that HCG 40,000, Ultrasound: Fetal HR 68 bpm, placenta not yet formed, no evidenced for DEBO, yolk sac identified, small free fluid. Blood type AB Positive. Hgb: 13.2     Bleeding has now tapered, and is brown.    Patient repeated US yesterday with attached findings  FINDINGS:  UTERUS: Single intrauterine gestation sac.  CRL: Measures 0.3 cm, equals 6 weeks, 0 days.  FETAL HEART RATE: No fetal cardiac activity detected as of yet.  AMNIOTIC FLUID: Normal.  PLACENTA: Large complex subchorionic hemorrhage surrounds the gestational sac.    Patient wanted to know if she was miscarrying - RN discussed that this has not been reviewed by the provider but from looking at the reports it is hard to say what exactly it is.    Discussed there is no immediate concern with DEBO.  Patient OK to wait until phone visit at 3:00.    Josi Choudhury RN

## 2023-05-23 NOTE — Clinical Note
Navarro Jones,  You are seeing this patient next week after ultrasound. Large DEBO. Likely miscarriage.  History of DVT and prior pregnancy baby had congenital anomalies.   Just want you to have heads up. She is very smart and has a lot of questions, and there's a little more going on than just first tri bleeding.

## 2023-05-24 NOTE — PROGRESS NOTES
"Toma is a 32 year old who is being evaluated via a billable telephone visit.      What phone number would you like to be contacted at? [phone]    How would you like to obtain your AVS? MyChart    Distant Location (provider location):  On-site   Patient location: home     Assessment & Plan     Subchorionic hematoma in first trimester, single or unspecified fetus  Reviewed ultrasound report and images.   Compared ultrasound report to previous on 5/21 - new description of large DEBO.   Discussed Ddx: ongoing pregnancy, progress to miscarriage. On images, gestational sac is oblong and may be collapsing.   Discussed discrepancy between LMP and dating on US - by dating would be 7 weeks.   Discussed how miscarriage evolves - bleeding and cramping, when to call.   Discussed diagnostic difficulties at this early GA. Recommend keeping US appointment with follow-up for next week.   Rh positive    Personal history of DVT (deep vein thrombosis)  Discussed history of postpartum DVT.   Recommend not starting anti-coagulation yet given large DEBO; start if ultrasound shows ongoing pregnancy next week    Current peralta pregnancy with history of congenital anomaly in prior child, antepartum  Discussed prior pregnancy   Recommend first trimester screen and MFM consult in pregnancy if ongoing. Consider pre-conception consult if this pregnancy progresses to miscarriage.         45 minutes spent by me on the date of the encounter doing chart review, history and exam, documentation and further activities per the note       BMI:   Estimated body mass index is 35.36 kg/m  as calculated from the following:    Height as of 5/16/23: 1.791 m (5' 10.5\").    Weight as of 5/16/23: 113.4 kg (250 lb).   Weight management plan: not discussed      No follow-ups on file.    Jesica Tang MD  [facility]    Subjective   Toma is a 32 year old, presenting for the following health issues:  Ultrasound         View : No " data to display.              [facility]     Presents for f/u ultrasound results and ER visit.   Patient's last menstrual period was 2023.   Had bleeding  so presented to ER at [facility]  Us there showed possible fetal heart rate of 68 and no DEBO.   Had ultrasound scheduled today   Has not been having ongoing bleeding.     Concerned about history of DVT (provoked, postpartum) and possible need for anti-coagulation with the DEBO now seen.     Concerned about miscarriage in setting of son whose birth was complicated by anomalies and polyhydramnios causing  labor. She reports he gets his care at [facility]. She had genetic counseling and MFM consultation at [facility] and no cause has been found. Recurrence risk was unknown.     Past Medical History:   Diagnosis Date     Acute deep vein thrombosis (DVT) of proximal vein of left lower extremity (H)     post partum     Anxiety      Hidradenitis suppurativa      Infection due to 2019 novel coronavirus      Nephrolithiasis      Plantar fascial fibromatosis of right foot      Varicella        Past Surgical History:   Procedure Laterality Date     EXCISE MASS FOOT Right 2023    Procedure: Excision and Neuroplasty of Right Plantar Foot Mass;  Surgeon: Hunter Redding MD;  Location: [facility]     [place] teeth extraction         Family History   Problem Relation Age of Onset     Diabetes Father      Kidney Disease Father      Post Operative Nausea and Vomiting Sister      Diabetes Brother      Mental Illness Brother      Deep Vein Thrombosis (DVT) Maternal Grandmother      Dementia Maternal Grandfather      Heart Disease Maternal Grandfather      Dementia Paternal Grandmother      Diabetes Paternal Grandfather      Heart Failure Paternal Grandfather       Birth Son      Pulmonary Embolism Paternal Uncle        Social History     Socioeconomic History     Marital status:      Spouse name: Not on file     Number of children: Not on file     Years of education: Not on  file     Highest education level: Not on file   Occupational History     Not on file   Tobacco Use     Smoking status: Never     Smokeless tobacco: Never   Vaping Use     Vaping status: Never Used   Substance and Sexual Activity     Alcohol use: Not Currently     Alcohol/week: 1.0 - 2.0 standard drink of alcohol     Types: 1 - 2 Standard drinks or equivalent per week     Comment: once weekly     Drug use: Never     Sexual activity: Yes     Partners: Male   Other Topics Concern     Not on file   Social History Narrative     Not on file     Social Determinants of Health     Financial Resource Strain: Not on file   Food Insecurity: Not on file   Transportation Needs: Not on file   Physical Activity: Not on file   Stress: Not on file   Social Connections: Not on file   Intimate Partner Violence: Not on file   Housing Stability: Not on file       Current Outpatient Medications   Medication     Prenatal Vit-DSS-Fe Cbn-FA (PRENATAL AD PO)     No current facility-administered medications for this visit.        No Known Allergies        Review of Systems   Constitutional, HEENT, cardiovascular, pulmonary, gi and gu systems are negative, except as otherwise noted.      Objective           Vitals:  No vitals were obtained today due to virtual visit.    Physical Exam   healthy, alert and no distress  PSYCH: Alert and oriented times 3; coherent speech, normal   rate and volume, able to articulate logical thoughts, able   to abstract reason, no tangential thoughts, no hallucinations   or delusions  Her affect is normal  RESP: No cough, no audible wheezing, able to talk in full sentences  Remainder of exam unable to be completed due to telephone visits    EXAM: US OB <14 WEEKS WITH TRANSVAGINAL SINGLE  LOCATION: [facility]  DATE/TIME: 5/22/2023 5:20 PM CDT     INDICATION: Vaginal bleeding.  COMPARISON: None.  TECHNIQUE: Transabdominal scans were performed. Endovaginal ultrasound was performed to better  visualize the embryo.     FINDINGS:  UTERUS: Single intrauterine gestation sac.  CRL: Measures 0.3 cm, equals 6 weeks, 0 days.  FETAL HEART RATE: No fetal cardiac activity detected as of yet.  AMNIOTIC FLUID: Normal.  PLACENTA: Large complex subchorionic hemorrhage surrounds the gestational sac.     RIGHT OVARY: Normal.  LEFT OVARY: Normal.                                                                      IMPRESSION: Single intrauterine gestation at 6 weeks, 0 days. No fetal cardiac activity is detected as of yet. There is a very large complex subchorionic bleed surrounding the sac. Short term follow-up suggested.     NOTE: ABNORMAL REPORT     THE DICTATION ABOVE DESCRIBES AN ABNORMALITY FOR WHICH FOLLOW-UP IS NEEDED.               Ref Range & Units 2 d ago    HCG BETA QUANT,PREGNANCY mIU/mL 40,510          WHITE BLOOD COUNT         4.5 - 11.0 thou/cu mm 9.7    RED BLOOD COUNT           4.00 - 5.20 mil/cu mm 4.78    HEMOGLOBIN                12.0 - 16.0 g/dL 13.2    HEMATOCRIT                33.0 - 51.0 % 39.8    MCV                       80 - 100 fL 83    MCH                       26.0 - 34.0 pg 27.6    MCHC                      32.0 - 36.0 g/dL 33.2    RDW                       11.5 - 15.5 % 13.3    PLATELET COUNT            140 - 440 thou/cu mm 320    MPV                       6.5 - 11.0 fL 10.8    NRBC                      % 0.0    ABS NRBC thou /cu mm 0.0    % NEUT % 68.9    % LYMPH % 23.9    % MONO % 5.0    % EOS % 1.4    % BASO % 0.4    % IMMATURE GRAN (METAS,MYELOS,PROS) % 0.4    ABSOLUTE NEUTROPHILS      1.7 - 7.0 thou/cu mm 6.7    ABSOLUTE LYMPHOCYTES      0.9 - 2.9 thou/cu mm 2.3    ABSOLUTE MONOCYTES        <0.9 thou/cu mm 0.5    ABSOLUTE EOSINOPHILS      <0.5 thou/cu mm 0.1    ABSOLUTE BASOPHILS        <0.3 thou/cu mm 0.0    ABSOLUTE IMMATURE GRANULOCYTES(METAS,MYELOS,PROS) <0.3 thou/cu mm 0.0       SODIUM 136 - 145 mmol/L 139     POTASSIUM 3.5 - 5.1 mmol/L 3.8     CHLORIDE 98 - 107 mmol/L 106      CO2,TOTAL 22 - 29 mmol/L 22     ANION GAP 5 - 18 11     GLUCOSE 70 - 99 mg/dL 93     CALCIUM 8.6 - 10.0 mg/dL 9.1     BUN 6 - 20 mg/dL 6     CREATININE 0.50 - 0.90 mg/dL 0.63     BUN/CREAT RATIO 10 - 20 10     eGFR >90 mL/min/1.73m2 >90         ABORH                      AB Rh Positive    ANTIBODY SCREEN           Impression    1.  Single live intrauterine pregnancy at 6 weeks, 1 day, [place] 01/13/2024.   2.  Slow fetal heart rate at 68 bpm, which can portend poor future outcome or aneuploidy. Please consider attention on follow-up imaging.  Narrative    For Patients: As a result of the 21st Century Cures Act, medical imaging exams and procedure reports are released immediately into your electronic medical record. You may view this report before your referring provider. If you have questions, please contact your health care provider.     EXAM: US OB 1ST TRI SINGLE TA AND TV   LOCATION: [facility] MEDICAL IM[facility]   DATE/TIME: 5/21/2023 1:56 PM CDT     INDICATION: VAGINAL BLEEDING, beta hCG 64603 mIU/mL   COMPARISON: None.   TECHNIQUE: Transabdominal scans were performed. Endovaginal ultrasound was performed to better visualize the embryo.     FINDINGS:   UTERUS: 9.3 x 7.6 x 5.3 cm.   CRL: Measures 0.4 cm, equals 6 weeks, 1 day.   FETAL HEART RATE: 68 bpm.   AMNIOTIC FLUID: Normal.   PLACENTA: Not yet formed. No evidence for sub-chorionic hemorrhage.     Yolk sac identified.     RIGHT OVARY: Not seen due to overlying bowel gas.   LEFT OVARY: 2.6 x 2.4 x 2.3 cm. There is a 1.8 cm left ovarian simple cyst/follicle.     Small free fluid.      Phone call duration: 30 minutes

## 2023-05-24 NOTE — PROGRESS NOTES
.telep  {PROVIDER CHARTING PREFERENCE:[id number]}    Subjective   Toma is a 32 year old, presenting for the following health issues:  Ultrasound         View : No data to display.              HPI   Presents for f/u ultrasound results and ER visit.   Had bleeding  so presented to ER at [facility]  Us there showed possible fetal heart rate of 68 and no DEBO.   Had ultrasound scheduled today   Has not been having ongoing bleeding.     Concerned about history of DVT (provoked, postpartum) and possible need for anti-coagulation with the DEBO now seen.     Concerned about miscarriage in setting of son whose birth was complicated by anomalies and polyhydramnios causing  labor. She reports he gets his care at [facility]. She had genetic counseling and MFM consultation at [facility] and no cause has been found. Recurrence risk was unknown.       Past Medical History:   Diagnosis Date     Acute deep vein thrombosis (DVT) of proximal vein of left lower extremity (H)     post partum     Anxiety      Hidradenitis suppurativa      Infection due to 2019 novel coronavirus      Nephrolithiasis      Plantar fascial fibromatosis of right foot      Varicella        Past Surgical History:   Procedure Laterality Date     EXCISE MASS FOOT Right 2023    Procedure: Excision and Neuroplasty of Right Plantar Foot Mass;  Surgeon: Hunter Redding MD;  Location: [facility]     [place] teeth extraction         Family History   Problem Relation Age of Onset     Diabetes Father      Kidney Disease Father      Post Operative Nausea and Vomiting Sister      Diabetes Brother      Mental Illness Brother      Deep Vein Thrombosis (DVT) Maternal Grandmother      Dementia Maternal Grandfather      Heart Disease Maternal Grandfather      Dementia Paternal Grandmother      Diabetes Paternal Grandfather      Heart Failure Paternal Grandfather       Birth Son      Pulmonary Embolism Paternal Uncle        Social History     Socioeconomic History     Marital  status:      Spouse name: Not on file     Number of children: Not on file     Years of education: Not on file     Highest education level: Not on file   Occupational History     Not on file   Tobacco Use     Smoking status: Never     Smokeless tobacco: Never   Vaping Use     Vaping status: Never Used   Substance and Sexual Activity     Alcohol use: Not Currently     Alcohol/week: 1.0 - 2.0 standard drink of alcohol     Types: 1 - 2 Standard drinks or equivalent per week     Comment: once weekly     Drug use: Never     Sexual activity: Yes     Partners: Male   Other Topics Concern     Not on file   Social History Narrative     Not on file     Social Determinants of Health     Financial Resource Strain: Not on file   Food Insecurity: Not on file   Transportation Needs: Not on file   Physical Activity: Not on file   Stress: Not on file   Social Connections: Not on file   Intimate Partner Violence: Not on file   Housing Stability: Not on file       Current Outpatient Medications   Medication     Prenatal Vit-DSS-Fe Cbn-FA (PRENATAL AD PO)     No current facility-administered medications for this visit.        No Known Allergies        Review of Systems   Constitutional, HEENT, cardiovascular, pulmonary, gi and gu systems are negative, except as otherwise noted.      Objective    LMP 04/04/2023   There is no height or weight on file to calculate BMI.  Physical Exam   {Brief/partially selected :908731}    Ref Range & Units 2 d ago    HCG BETA QUANT,PREGNANCY mIU/mL 40,510          WHITE BLOOD COUNT         4.5 - 11.0 thou/cu mm 9.7    RED BLOOD COUNT           4.00 - 5.20 mil/cu mm 4.78    HEMOGLOBIN                12.0 - 16.0 g/dL 13.2    HEMATOCRIT                33.0 - 51.0 % 39.8    MCV                       80 - 100 fL 83    MCH                       26.0 - 34.0 pg 27.6    MCHC                      32.0 - 36.0 g/dL 33.2    RDW                       11.5 - 15.5 % 13.3    PLATELET COUNT            140 - 440  thou/cu mm 320    MPV                       6.5 - 11.0 fL 10.8    NRBC                      % 0.0    ABS NRBC thou /cu mm 0.0    % NEUT % 68.9    % LYMPH % 23.9    % MONO % 5.0    % EOS % 1.4    % BASO % 0.4    % IMMATURE GRAN (METAS,MYELOS,PROS) % 0.4    ABSOLUTE NEUTROPHILS      1.7 - 7.0 thou/cu mm 6.7    ABSOLUTE LYMPHOCYTES      0.9 - 2.9 thou/cu mm 2.3    ABSOLUTE MONOCYTES        <0.9 thou/cu mm 0.5    ABSOLUTE EOSINOPHILS      <0.5 thou/cu mm 0.1    ABSOLUTE BASOPHILS        <0.3 thou/cu mm 0.0    ABSOLUTE IMMATURE GRANULOCYTES(METAS,MYELOS,PROS) <0.3 thou/cu mm 0.0       SODIUM 136 - 145 mmol/L 139     POTASSIUM 3.5 - 5.1 mmol/L 3.8     CHLORIDE 98 - 107 mmol/L 106     CO2,TOTAL 22 - 29 mmol/L 22     ANION GAP 5 - 18 11     GLUCOSE 70 - 99 mg/dL 93     CALCIUM 8.6 - 10.0 mg/dL 9.1     BUN 6 - 20 mg/dL 6     CREATININE 0.50 - 0.90 mg/dL 0.63     BUN/CREAT RATIO 10 - 20 10     eGFR >90 mL/min/1.73m2 >90         ABORH                      AB Rh Positive    ANTIBODY SCREEN           Impression    1.  Single live intrauterine pregnancy at 6 weeks, 1 day, [place] 01/13/2024.   2.  Slow fetal heart rate at 68 bpm, which can portend poor future outcome or aneuploidy. Please consider attention on follow-up imaging.  Narrative    For Patients: As a result of the 21st Century Cures Act, medical imaging exams and procedure reports are released immediately into your electronic medical record. You may view this report before your referring provider. If you have questions, please contact your health care provider.     EXAM: US OB 1ST TRI SINGLE TA AND TV   LOCATION: [facility] MEDICAL IMAGING   DATE/TIME: 5/21/2023 1:56 PM CDT     INDICATION: VAGINAL BLEEDING, beta hCG 36102 mIU/mL   COMPARISON: None.   TECHNIQUE: Transabdominal scans were performed. Endovaginal ultrasound was performed to better visualize the embryo.     FINDINGS:   UTERUS: 9.3 x 7.6 x 5.3 cm.   CRL: Measures 0.4 cm, equals 6 weeks, 1 day.   FETAL HEART RATE: 68  bpm.   AMNIOTIC FLUID: Normal.   PLACENTA: Not yet formed. No evidence for sub-chorionic hemorrhage.     Yolk sac identified.     RIGHT OVARY: Not seen due to overlying bowel gas.   LEFT OVARY: 2.6 x 2.4 x 2.3 cm. There is a 1.8 cm left ovarian simple cyst/follicle.     Small free fluid.

## 2023-05-30 NOTE — ED TRIAGE NOTES
"Pt presents to the ED with c/o abdominal pain that started this morning. Pt endorses N/V/D. Pt endorses generalized abdominal pain. States family members \"recently had stomach bugs\".      Triage Assessment     Row Name 04/08/23 1954       Triage Assessment (Adult)    Airway WDL WDL       Respiratory WDL    Respiratory WDL WDL       Skin Circulation/Temperature WDL    Skin Circulation/Temperature WDL WDL       Cardiac WDL    Cardiac WDL WDL       Peripheral/Neurovascular WDL    Peripheral Neurovascular WDL WDL       Cognitive/Neuro/Behavioral WDL    Cognitive/Neuro/Behavioral WDL WDL              " Graft Donor Site Bandage (Optional-Leave Blank If You Don't Want In Note): Steri-strips and a pressure bandage were applied to the donor site.

## 2023-05-31 LAB
ABO/RH(D): NORMAL
ANTIBODY SCREEN: NEGATIVE
SPECIMEN EXPIRATION DATE: NORMAL

## 2023-06-01 ENCOUNTER — ANCILLARY PROCEDURE (OUTPATIENT)
Dept: ULTRASOUND IMAGING | Facility: CLINIC | Age: 33
End: 2023-06-01
Payer: COMMERCIAL

## 2023-06-01 ENCOUNTER — PRENATAL OFFICE VISIT (OUTPATIENT)
Dept: OBGYN | Facility: CLINIC | Age: 33
End: 2023-06-01
Payer: COMMERCIAL

## 2023-06-01 ENCOUNTER — TELEPHONE (OUTPATIENT)
Dept: OBGYN | Facility: CLINIC | Age: 33
End: 2023-06-01

## 2023-06-01 ENCOUNTER — ANESTHESIA EVENT (OUTPATIENT)
Dept: SURGERY | Facility: CLINIC | Age: 33
End: 2023-06-01
Payer: COMMERCIAL

## 2023-06-01 VITALS
TEMPERATURE: 97.3 F | BODY MASS INDEX: 34.06 KG/M2 | WEIGHT: 240.8 LBS | SYSTOLIC BLOOD PRESSURE: 102 MMHG | DIASTOLIC BLOOD PRESSURE: 78 MMHG | HEART RATE: 62 BPM

## 2023-06-01 DIAGNOSIS — O09.91 ENCOUNTER FOR SUPERVISION OF HIGH RISK PREGNANCY IN FIRST TRIMESTER, ANTEPARTUM: ICD-10-CM

## 2023-06-01 DIAGNOSIS — Z86.718 PERSONAL HISTORY OF DVT (DEEP VEIN THROMBOSIS): ICD-10-CM

## 2023-06-01 DIAGNOSIS — O02.1 MISSED AB: Primary | ICD-10-CM

## 2023-06-01 LAB
ERYTHROCYTE [DISTWIDTH] IN BLOOD BY AUTOMATED COUNT: 12.8 % (ref 10–15)
HBV SURFACE AG SERPL QL IA: NONREACTIVE
HCT VFR BLD AUTO: 41.5 % (ref 35–47)
HCV AB SERPL QL IA: NONREACTIVE
HGB BLD-MCNC: 13.5 G/DL (ref 11.7–15.7)
HIV 1+2 AB+HIV1 P24 AG SERPL QL IA: NONREACTIVE
MCH RBC QN AUTO: 27.8 PG (ref 26.5–33)
MCHC RBC AUTO-ENTMCNC: 32.5 G/DL (ref 31.5–36.5)
MCV RBC AUTO: 85 FL (ref 78–100)
PLATELET # BLD AUTO: 351 10E3/UL (ref 150–450)
RBC # BLD AUTO: 4.86 10E6/UL (ref 3.8–5.2)
T PALLIDUM AB SER QL: NONREACTIVE
WBC # BLD AUTO: 11.7 10E3/UL (ref 4–11)

## 2023-06-01 PROCEDURE — 36415 COLL VENOUS BLD VENIPUNCTURE: CPT | Performed by: OBSTETRICS & GYNECOLOGY

## 2023-06-01 PROCEDURE — 76817 TRANSVAGINAL US OBSTETRIC: CPT | Performed by: OBSTETRICS & GYNECOLOGY

## 2023-06-01 PROCEDURE — 86900 BLOOD TYPING SEROLOGIC ABO: CPT | Performed by: OBSTETRICS & GYNECOLOGY

## 2023-06-01 PROCEDURE — 87389 HIV-1 AG W/HIV-1&-2 AB AG IA: CPT | Performed by: OBSTETRICS & GYNECOLOGY

## 2023-06-01 PROCEDURE — 86850 RBC ANTIBODY SCREEN: CPT | Performed by: OBSTETRICS & GYNECOLOGY

## 2023-06-01 PROCEDURE — 87340 HEPATITIS B SURFACE AG IA: CPT | Performed by: OBSTETRICS & GYNECOLOGY

## 2023-06-01 PROCEDURE — 86762 RUBELLA ANTIBODY: CPT | Performed by: OBSTETRICS & GYNECOLOGY

## 2023-06-01 PROCEDURE — 86901 BLOOD TYPING SEROLOGIC RH(D): CPT | Performed by: OBSTETRICS & GYNECOLOGY

## 2023-06-01 PROCEDURE — 85027 COMPLETE CBC AUTOMATED: CPT | Performed by: OBSTETRICS & GYNECOLOGY

## 2023-06-01 PROCEDURE — 86780 TREPONEMA PALLIDUM: CPT | Performed by: OBSTETRICS & GYNECOLOGY

## 2023-06-01 PROCEDURE — 86803 HEPATITIS C AB TEST: CPT | Performed by: OBSTETRICS & GYNECOLOGY

## 2023-06-01 PROCEDURE — 99215 OFFICE O/P EST HI 40 MIN: CPT | Mod: 57 | Performed by: OBSTETRICS & GYNECOLOGY

## 2023-06-01 ASSESSMENT — ENCOUNTER SYMPTOMS: SEIZURES: 0

## 2023-06-01 ASSESSMENT — LIFESTYLE VARIABLES: TOBACCO_USE: 0

## 2023-06-01 NOTE — PROGRESS NOTES
HPI:  Toma Blanco is a 32 year old female  at 8w2d here for a consultation regarding:  Ob viability US follow-up.        She had bleeding on 23 and was seen in the ED at [facility].  pelvic ultrasound showed a fetal pole and FHR at 68.  Subsequently she had a repeat US the following day showing large subchorionic hemorrhage, and no fetal heart beat.     She presented with her  today for repeat US for confirmation.     US today shows nonviable pregnancy with irregular gest sac and large subchorionic hemorrhage.     Patient is not bleeding or cramping today.      Patient disappointed and wondering what went wrong.   Fist child has multiple defects.   Complications: patient had a DVT following birth of first child.       OB History    Para Term  AB Living   2 1 0 1 0 1   SAB IAB Ectopic Multiple Live Births   0 0 0 0 1      # Outcome Date GA Lbr Mikie/2nd Weight Sex Delivery Anes PTL Lv   2 Current            1  21 33w0d  1.531 kg (3 lb 6 oz) M   Y YVONNE      Complications: Polyhydramnios      Obstetric Comments   First baby: mult anomalies, poly, PTL; no genetic positive test                Past Medical History:   Diagnosis Date     Acute deep vein thrombosis (DVT) of proximal vein of left lower extremity (H)     post partum     Anxiety      Hidradenitis suppurativa      Infection due to 2019 novel coronavirus      Nephrolithiasis      Plantar fascial fibromatosis of right foot      Varicella        Past Surgical History:   Procedure Laterality Date     EXCISE MASS FOOT Right 2023    Procedure: Excision and Neuroplasty of Right Plantar Foot Mass;  Surgeon: Hunter Redding MD;  Location: [facility] OR     [place] teeth extraction         Family History   Problem Relation Age of Onset     Diabetes Father      Kidney Disease Father      Post Operative Nausea and Vomiting Sister      Diabetes Brother      Mental Illness Brother      Deep Vein Thrombosis (DVT) Maternal  Grandmother      Dementia Maternal Grandfather      Heart Disease Maternal Grandfather      Dementia Paternal Grandmother      Diabetes Paternal Grandfather      Heart Failure Paternal Grandfather       Birth Son      Pulmonary Embolism Paternal Uncle          Medications:    Current Outpatient Medications:      Prenatal Vit-DSS-Fe Cbn-FA (PRENATAL AD PO), , Disp: , Rfl:     Allergies:  Patient has no known allergies.         EXAM:  /78   Pulse 62   Temp 97.3  F (36.3  C) (Temporal)   Wt 109.2 kg (240 lb 12.8 oz)   LMP 2023   Breastfeeding No   BMI 34.06 kg/m      General - pleasant female,  Neurological - Alert and oriented  Psych:   mood and affect sad.  NECK: without thyromegaly or adenopathy  CHEST: clear to auscultation  CV: RRR without murmur  ABDOMEN: S, NT, no palpable masses or hepatosplenomegaly  MUSCULOSKELETAL: no obvious abnormalities.  NEUROLOGICAL: normal strength, sensation   BLE: nontender, no edema   PELVIC: EG - - deferred.         ASSESSMENT:/PLAN:  (O02.1) Missed ab  (primary encounter diagnosis)  Comment:  We reviewed the US images and discussed findings.   Options:  1. Expectant management:  - Waiting for miscarriage to start  - Heavy bleeding and cramping until pregnancy sac passes, then bleeding and cramping tapers off     2. Misoprostol  - Medication to start miscarriage    3. D&C  - Outpatient surgery to complete the miscarriage  - Home the same day  - Short procedure with IV sedation  - tissue to path     Patient wants this over ASAP, has been anticipating this for a week or 2.  Sad and wants to move on.   Would like suction D & C.   Option available tomorrow and  Patient would like to schedule.     Plan: ABO/Rh type and screen, CBC with platelets,         Case Request: DILATION AND CURETTAGE, UTERUS,         USING SUCTION       (Z86.718) Personal history of DVT (deep vein thrombosis)  Comment: in the postpartum period  Plan: will need anticoagulation with next  pregnancy but not indicated at this time.   discussed patient will be up moving around right after D & C so not necessary at this time.         Orders Placed This Encounter   Procedures     CBC with platelets     ABO/Rh type and screen         I spent a total of 40 minutes reviewing ultrasound reports, images, documentation and discussing with the patient on the date of encounter.          Karen Tapia MD

## 2023-06-01 NOTE — H&P (VIEW-ONLY)
HPI:  Toma Blanco is a 32 year old female  at 8w2d here for a consultation regarding:  Ob viability US follow-up.        She had bleeding on 23 and was seen in the ED at [facility].  pelvic ultrasound showed a fetal pole and FHR at 68.  Subsequently she had a repeat US the following day showing large subchorionic hemorrhage, and no fetal heart beat.     She presented with her  today for repeat US for confirmation.     US today shows nonviable pregnancy with irregular gest sac and large subchorionic hemorrhage.     Patient is not bleeding or cramping today.      Patient disappointed and wondering what went wrong.   Fist child has multiple defects.   Complications: patient had a DVT following birth of first child.       OB History    Para Term  AB Living   2 1 0 1 0 1   SAB IAB Ectopic Multiple Live Births   0 0 0 0 1      # Outcome Date GA Lbr Mikie/2nd Weight Sex Delivery Anes PTL Lv   2 Current            1  21 33w0d  1.531 kg (3 lb 6 oz) M   Y YVONNE      Complications: Polyhydramnios      Obstetric Comments   First baby: mult anomalies, poly, PTL; no genetic positive test                Past Medical History:   Diagnosis Date     Acute deep vein thrombosis (DVT) of proximal vein of left lower extremity (H)     post partum     Anxiety      Hidradenitis suppurativa      Infection due to 2019 novel coronavirus      Nephrolithiasis      Plantar fascial fibromatosis of right foot      Varicella        Past Surgical History:   Procedure Laterality Date     EXCISE MASS FOOT Right 2023    Procedure: Excision and Neuroplasty of Right Plantar Foot Mass;  Surgeon: Hunter Redding MD;  Location[facility]     [place] teeth extraction         Family History   Problem Relation Age of Onset     Diabetes Father      Kidney Disease Father      Post Operative Nausea and Vomiting Sister      Diabetes Brother      Mental Illness Brother      Deep Vein Thrombosis (DVT) Maternal  Grandmother      Dementia Maternal Grandfather      Heart Disease Maternal Grandfather      Dementia Paternal Grandmother      Diabetes Paternal Grandfather      Heart Failure Paternal Grandfather       Birth Son      Pulmonary Embolism Paternal Uncle          Medications:    Current Outpatient Medications:      Prenatal Vit-DSS-Fe Cbn-FA (PRENATAL AD PO), , Disp: , Rfl:     Allergies:  Patient has no known allergies.         EXAM:  /78   Pulse 62   Temp 97.3  F (36.3  C) (Temporal)   Wt 109.2 kg (240 lb 12.8 oz)   LMP 2023   Breastfeeding No   BMI 34.06 kg/m      General - pleasant female,  Neurological - Alert and oriented  Psych:   mood and affect sad.  NECK: without thyromegaly or adenopathy  CHEST: clear to auscultation  CV: RRR without murmur  ABDOMEN: S, NT, no palpable masses or hepatosplenomegaly  MUSCULOSKELETAL: no obvious abnormalities.  NEUROLOGICAL: normal strength, sensation   BLE: nontender, no edema   PELVIC: EG - - deferred.         ASSESSMENT:/PLAN:  (O02.1) Missed ab  (primary encounter diagnosis)  Comment:  We reviewed the US images and discussed findings.   Options:  1. Expectant management:  - Waiting for miscarriage to start  - Heavy bleeding and cramping until pregnancy sac passes, then bleeding and cramping tapers off     2. Misoprostol  - Medication to start miscarriage    3. D&C  - Outpatient surgery to complete the miscarriage  - Home the same day  - Short procedure with IV sedation  - tissue to path     Patient wants this over ASAP, has been anticipating this for a week or 2.  Sad and wants to move on.   Would like suction D & C.   Option available tomorrow and  Patient would like to schedule.     Plan: ABO/Rh type and screen, CBC with platelets,         Case Request: DILATION AND CURETTAGE, UTERUS,         USING SUCTION       (Z86.718) Personal history of DVT (deep vein thrombosis)  Comment: in the postpartum period  Plan: will need anticoagulation with next  pregnancy but not indicated at this time.   discussed patient will be up moving around right after D & C so not necessary at this time.         Orders Placed This Encounter   Procedures     CBC with platelets     ABO/Rh type and screen         I spent a total of 40 minutes reviewing ultrasound reports, images, documentation and discussing with the patient on the date of encounter.          Karen Tapia MD

## 2023-06-01 NOTE — ANESTHESIA PREPROCEDURE EVALUATION
Anesthesia Pre-Procedure Evaluation    Patient: Toma Blanco   MRN: [id number] : 1990        Procedure : Procedure(s):  DILATION AND CURETTAGE, UTERUS, USING SUCTION          Past Medical History:   Diagnosis Date     Acute deep vein thrombosis (DVT) of proximal vein of left lower extremity (H)     post partum     Anxiety      Hidradenitis suppurativa      Infection due to 2019 novel coronavirus      Nephrolithiasis      Plantar fascial fibromatosis of right foot      Varicella       Past Surgical History:   Procedure Laterality Date     EXCISE MASS FOOT Right 2023    Procedure: Excision and Neuroplasty of Right Plantar Foot Mass;  Surgeon: Hunter Redding MD;  Location[facility] OR     [place] teeth extraction        No Known Allergies   Social History     Tobacco Use     Smoking status: Never     Smokeless tobacco: Never   Vaping Use     Vaping status: Never Used   Substance Use Topics     Alcohol use: Not Currently     Alcohol/week: 1.0 - 2.0 standard drink of alcohol     Types: 1 - 2 Standard drinks or equivalent per week     Comment: once weekly      Wt Readings from Last 1 Encounters:   23 109.2 kg (240 lb 12.8 oz)        Anesthesia Evaluation   Pt has had prior anesthetic. Type: General.    No history of anesthetic complications       ROS/MED HX  ENT/Pulmonary:     (+) DIMAS risk factors, obese,  (-) tobacco use   Neurologic:  - neg neurologic ROS  (-) no seizures and no CVA   Cardiovascular: Comment: BP range 110/70    (+) -----Irregular Heartbeat/Palpitations, No previous cardiac testing  (-) taking anticoagulants/antiplatelets   METS/Exercise Tolerance: >4 METS    Hematologic:     (+) History of blood clots, pt is not anticoagulated,  (-) history of blood transfusion   Musculoskeletal: Comment: Foot pain      GI/Hepatic:    (-) GERD   Renal/Genitourinary: Comment: Recent kidney stone with ED visit 22. Resolved, assumed passed    (+) Nephrolithiasis ,     Endo:     (+) Obesity,      Psychiatric/Substance Use:     (+) psychiatric history anxiety     Infectious Disease:  - neg infectious disease ROS     Malignancy:  - neg malignancy ROS     Other: Comment: Hidradenitis suppurativa           Physical Exam    Airway  airway exam normal           Respiratory Devices and Support         Dental    unable to assess        Cardiovascular   cardiovascular exam normal          Pulmonary   pulmonary exam normal                OUTSIDE LABS:  CBC:   Lab Results   Component Value Date    WBC 11.7 (H) 06/01/2023    WBC 18.2 (H) 04/08/2023    HGB 13.5 06/01/2023    HGB 15.5 04/08/2023    HCT 41.5 06/01/2023    HCT 46.5 04/08/2023     06/01/2023     04/08/2023     BMP:   Lab Results   Component Value Date     04/08/2023     12/06/2022    POTASSIUM 4.2 04/08/2023    POTASSIUM 4.6 12/06/2022    CHLORIDE 106 04/08/2023    CHLORIDE 104 12/06/2022    CO2 23 04/08/2023    CO2 24 12/06/2022    BUN 13 04/08/2023    BUN 9.5 12/06/2022    CR 0.80 04/08/2023    CR 0.72 12/06/2022     04/08/2023    GLC 94 12/06/2022     COAGS: No results found for: PTT, INR, FIBR  POC: No results found for: BGM, HCG, HCGS  HEPATIC:   Lab Results   Component Value Date    ALBUMIN 4.2 04/08/2023    PROTTOTAL 7.6 04/08/2023    ALT 12 04/08/2023    AST 10 04/08/2023    ALKPHOS 61 04/08/2023    BILITOTAL 0.6 04/08/2023     OTHER:   Lab Results   Component Value Date    TAURUS 9.6 04/08/2023    MAG 1.9 04/08/2023    SED 27 (H) 12/06/2014       Anesthesia Plan    ASA Status:  2   NPO Status:  NPO Appropriate    Anesthesia Type: MAC.     - Reason for MAC: straight local not clinically adequate   Induction: Intravenous.   Maintenance: TIVA.        Consents    Anesthesia Plan(s) and associated risks, benefits, and realistic alternatives discussed. Questions answered and patient/representative(s) expressed understanding.    - Discussed:     - Discussed with:  Patient, Spouse      - Extended Intubation/Ventilatory  Support Discussed: No.      - Patient is DNR/DNI Status: No    Use of blood products discussed: No .     Postoperative Care    Pain management: IV analgesics, Oral pain medications, Multi-modal analgesia.   PONV prophylaxis: Ondansetron (or other 5HT-3), Background Propofol Infusion, Dexamethasone or Solumedrol     Comments:                Nini Lieberman MD

## 2023-06-01 NOTE — Clinical Note
Hi Robyn This lady has a missed ab, h/o Dvt after first pregnancy.  On for D & C with you tomorrow. RR

## 2023-06-01 NOTE — TELEPHONE ENCOUNTER
Type of surgery: gyn  Location of surgery: [facility]  Date and time of surgery: 06/02/23 11:45AM  Surgeon: Vasu  Pre-Op Appt Date: surgeon  Post-Op Appt Date: 4 weeks, will schedule later   Packet sent out: given in office today  Pre-cert/Authorization completed:  Not Applicable  Date: 06/01/23     HUMBLE Encarnacion  She/her/hers  [facility] Surgery Scheduler

## 2023-06-02 ENCOUNTER — HOSPITAL ENCOUNTER (OUTPATIENT)
Facility: CLINIC | Age: 33
Discharge: HOME | End: 2023-06-02
Attending: OBSTETRICS & GYNECOLOGY | Admitting: OBSTETRICS & GYNECOLOGY
Payer: COMMERCIAL

## 2023-06-02 ENCOUNTER — ANESTHESIA (OUTPATIENT)
Dept: SURGERY | Facility: CLINIC | Age: 33
End: 2023-06-02
Payer: COMMERCIAL

## 2023-06-02 VITALS
SYSTOLIC BLOOD PRESSURE: 101 MMHG | DIASTOLIC BLOOD PRESSURE: 49 MMHG | TEMPERATURE: 97.5 F | RESPIRATION RATE: 16 BRPM | WEIGHT: 240.74 LBS | BODY MASS INDEX: 34.47 KG/M2 | OXYGEN SATURATION: 100 % | HEIGHT: 70 IN | HEART RATE: 64 BPM

## 2023-06-02 LAB
RUBV IGG SERPL QL IA: 3.05 INDEX
RUBV IGG SERPL QL IA: POSITIVE

## 2023-06-02 PROCEDURE — 88305 TISSUE EXAM BY PATHOLOGIST: CPT | Mod: 26 | Performed by: PATHOLOGY

## 2023-06-02 PROCEDURE — 250N000011 HC RX IP 250 OP 636: Performed by: ANESTHESIOLOGY

## 2023-06-02 PROCEDURE — 250N000011 HC RX IP 250 OP 636: Performed by: NURSE ANESTHETIST, CERTIFIED REGISTERED

## 2023-06-02 PROCEDURE — 360N000075 HC SURGERY LEVEL 2, PER MIN: Performed by: OBSTETRICS & GYNECOLOGY

## 2023-06-02 PROCEDURE — 250N000013 HC RX MED GY IP 250 OP 250 PS 637: Performed by: OBSTETRICS & GYNECOLOGY

## 2023-06-02 PROCEDURE — 250N000011 HC RX IP 250 OP 636: Performed by: OBSTETRICS & GYNECOLOGY

## 2023-06-02 PROCEDURE — 258N000003 HC RX IP 258 OP 636: Performed by: NURSE ANESTHETIST, CERTIFIED REGISTERED

## 2023-06-02 PROCEDURE — 59812 TREATMENT OF MISCARRIAGE: CPT | Performed by: OBSTETRICS & GYNECOLOGY

## 2023-06-02 PROCEDURE — 272N000001 HC OR GENERAL SUPPLY STERILE: Performed by: OBSTETRICS & GYNECOLOGY

## 2023-06-02 PROCEDURE — 88305 TISSUE EXAM BY PATHOLOGIST: CPT | Mod: TC | Performed by: OBSTETRICS & GYNECOLOGY

## 2023-06-02 PROCEDURE — 250N000009 HC RX 250: Performed by: OBSTETRICS & GYNECOLOGY

## 2023-06-02 PROCEDURE — 370N000017 HC ANESTHESIA TECHNICAL FEE, PER MIN: Performed by: OBSTETRICS & GYNECOLOGY

## 2023-06-02 PROCEDURE — 710N000012 HC RECOVERY PHASE 2, PER MINUTE: Performed by: OBSTETRICS & GYNECOLOGY

## 2023-06-02 PROCEDURE — 999N000141 HC STATISTIC PRE-PROCEDURE NURSING ASSESSMENT: Performed by: OBSTETRICS & GYNECOLOGY

## 2023-06-02 PROCEDURE — 250N000009 HC RX 250: Performed by: NURSE ANESTHETIST, CERTIFIED REGISTERED

## 2023-06-02 RX ORDER — SODIUM CHLORIDE, SODIUM LACTATE, POTASSIUM CHLORIDE, CALCIUM CHLORIDE 600; 310; 30; 20 MG/100ML; MG/100ML; MG/100ML; MG/100ML
INJECTION, SOLUTION INTRAVENOUS CONTINUOUS PRN
Status: DISCONTINUED | OUTPATIENT
Start: 2023-06-02 | End: 2023-06-02

## 2023-06-02 RX ORDER — OXYCODONE HYDROCHLORIDE 5 MG/1
5 TABLET ORAL
Status: DISCONTINUED | OUTPATIENT
Start: 2023-06-02 | End: 2023-06-02 | Stop reason: HOSPADM

## 2023-06-02 RX ORDER — ONDANSETRON 4 MG/1
4 TABLET, ORALLY DISINTEGRATING ORAL EVERY 30 MIN PRN
Status: DISCONTINUED | OUTPATIENT
Start: 2023-06-02 | End: 2023-06-02 | Stop reason: HOSPADM

## 2023-06-02 RX ORDER — ACETAMINOPHEN 325 MG/1
975 TABLET ORAL ONCE
Status: DISCONTINUED | OUTPATIENT
Start: 2023-06-02 | End: 2023-06-02 | Stop reason: HOSPADM

## 2023-06-02 RX ORDER — LIDOCAINE HYDROCHLORIDE 20 MG/ML
INJECTION, SOLUTION INFILTRATION; PERINEURAL PRN
Status: DISCONTINUED | OUTPATIENT
Start: 2023-06-02 | End: 2023-06-02

## 2023-06-02 RX ORDER — NALOXONE HYDROCHLORIDE 0.4 MG/ML
0.4 INJECTION, SOLUTION INTRAMUSCULAR; INTRAVENOUS; SUBCUTANEOUS
Status: DISCONTINUED | OUTPATIENT
Start: 2023-06-02 | End: 2023-06-02 | Stop reason: HOSPADM

## 2023-06-02 RX ORDER — ONDANSETRON 2 MG/ML
4 INJECTION INTRAMUSCULAR; INTRAVENOUS EVERY 30 MIN PRN
Status: DISCONTINUED | OUTPATIENT
Start: 2023-06-02 | End: 2023-06-02 | Stop reason: HOSPADM

## 2023-06-02 RX ORDER — SODIUM CHLORIDE, SODIUM LACTATE, POTASSIUM CHLORIDE, CALCIUM CHLORIDE 600; 310; 30; 20 MG/100ML; MG/100ML; MG/100ML; MG/100ML
INJECTION, SOLUTION INTRAVENOUS CONTINUOUS
Status: DISCONTINUED | OUTPATIENT
Start: 2023-06-02 | End: 2023-06-02 | Stop reason: HOSPADM

## 2023-06-02 RX ORDER — ACETAMINOPHEN 325 MG/1
975 TABLET ORAL ONCE
Status: COMPLETED | OUTPATIENT
Start: 2023-06-02 | End: 2023-06-02

## 2023-06-02 RX ORDER — FENTANYL CITRATE 50 UG/ML
25 INJECTION, SOLUTION INTRAMUSCULAR; INTRAVENOUS EVERY 5 MIN PRN
Status: DISCONTINUED | OUTPATIENT
Start: 2023-06-02 | End: 2023-06-02 | Stop reason: HOSPADM

## 2023-06-02 RX ORDER — NALOXONE HYDROCHLORIDE 0.4 MG/ML
0.2 INJECTION, SOLUTION INTRAMUSCULAR; INTRAVENOUS; SUBCUTANEOUS
Status: DISCONTINUED | OUTPATIENT
Start: 2023-06-02 | End: 2023-06-02 | Stop reason: HOSPADM

## 2023-06-02 RX ORDER — OXYCODONE HYDROCHLORIDE 10 MG/1
10 TABLET ORAL
Status: DISCONTINUED | OUTPATIENT
Start: 2023-06-02 | End: 2023-06-02 | Stop reason: HOSPADM

## 2023-06-02 RX ORDER — FENTANYL CITRATE 50 UG/ML
INJECTION, SOLUTION INTRAMUSCULAR; INTRAVENOUS PRN
Status: DISCONTINUED | OUTPATIENT
Start: 2023-06-02 | End: 2023-06-02

## 2023-06-02 RX ORDER — ONDANSETRON 2 MG/ML
INJECTION INTRAMUSCULAR; INTRAVENOUS PRN
Status: DISCONTINUED | OUTPATIENT
Start: 2023-06-02 | End: 2023-06-02

## 2023-06-02 RX ORDER — DOXYCYCLINE 100 MG/10ML
100 INJECTION, POWDER, LYOPHILIZED, FOR SOLUTION INTRAVENOUS
Status: COMPLETED | OUTPATIENT
Start: 2023-06-02 | End: 2023-06-02

## 2023-06-02 RX ORDER — KETOROLAC TROMETHAMINE 30 MG/ML
INJECTION, SOLUTION INTRAMUSCULAR; INTRAVENOUS PRN
Status: DISCONTINUED | OUTPATIENT
Start: 2023-06-02 | End: 2023-06-02

## 2023-06-02 RX ORDER — LIDOCAINE 40 MG/G
CREAM TOPICAL
Status: DISCONTINUED | OUTPATIENT
Start: 2023-06-02 | End: 2023-06-02 | Stop reason: HOSPADM

## 2023-06-02 RX ORDER — HYDROMORPHONE HYDROCHLORIDE 1 MG/ML
0.2 INJECTION, SOLUTION INTRAMUSCULAR; INTRAVENOUS; SUBCUTANEOUS EVERY 5 MIN PRN
Status: DISCONTINUED | OUTPATIENT
Start: 2023-06-02 | End: 2023-06-02 | Stop reason: HOSPADM

## 2023-06-02 RX ORDER — PROPOFOL 10 MG/ML
INJECTION, EMULSION INTRAVENOUS CONTINUOUS PRN
Status: DISCONTINUED | OUTPATIENT
Start: 2023-06-02 | End: 2023-06-02

## 2023-06-02 RX ORDER — LIDOCAINE HYDROCHLORIDE 10 MG/ML
INJECTION, SOLUTION INFILTRATION; PERINEURAL PRN
Status: DISCONTINUED | OUTPATIENT
Start: 2023-06-02 | End: 2023-06-02 | Stop reason: HOSPADM

## 2023-06-02 RX ADMIN — PROPOFOL 200 MCG/KG/MIN: 10 INJECTION, EMULSION INTRAVENOUS at 11:02

## 2023-06-02 RX ADMIN — KETOROLAC TROMETHAMINE 15 MG: 30 INJECTION, SOLUTION INTRAMUSCULAR at 11:21

## 2023-06-02 RX ADMIN — FENTANYL CITRATE 25 MCG: 50 INJECTION, SOLUTION INTRAMUSCULAR; INTRAVENOUS at 11:21

## 2023-06-02 RX ADMIN — FENTANYL CITRATE 50 MCG: 50 INJECTION, SOLUTION INTRAMUSCULAR; INTRAVENOUS at 11:06

## 2023-06-02 RX ADMIN — DOXYCYCLINE 100 MG: 100 INJECTION, POWDER, LYOPHILIZED, FOR SOLUTION INTRAVENOUS at 10:56

## 2023-06-02 RX ADMIN — FENTANYL CITRATE 25 MCG: 50 INJECTION, SOLUTION INTRAMUSCULAR; INTRAVENOUS at 11:13

## 2023-06-02 RX ADMIN — FENTANYL CITRATE 25 MCG: 50 INJECTION INTRAMUSCULAR; INTRAVENOUS at 11:40

## 2023-06-02 RX ADMIN — ONDANSETRON 4 MG: 2 INJECTION INTRAMUSCULAR; INTRAVENOUS at 11:08

## 2023-06-02 RX ADMIN — LIDOCAINE HYDROCHLORIDE 100 MG: 20 INJECTION, SOLUTION INFILTRATION; PERINEURAL at 11:02

## 2023-06-02 RX ADMIN — ACETAMINOPHEN 975 MG: 325 TABLET ORAL at 10:24

## 2023-06-02 RX ADMIN — MIDAZOLAM 2 MG: 1 INJECTION INTRAMUSCULAR; INTRAVENOUS at 10:56

## 2023-06-02 RX ADMIN — SODIUM CHLORIDE, SODIUM LACTATE, POTASSIUM CHLORIDE, CALCIUM CHLORIDE: 600; 310; 30; 20 INJECTION, SOLUTION INTRAVENOUS at 10:56

## 2023-06-02 ASSESSMENT — ACTIVITIES OF DAILY LIVING (ADL)
ADLS_ACUITY_SCORE: 35
ADLS_ACUITY_SCORE: 35

## 2023-06-02 NOTE — ANESTHESIA POSTPROCEDURE EVALUATION
Patient: Toma Blanco    Procedure: Procedure(s):  DILATION AND CURETTAGE, UTERUS, USING SUCTION       Anesthesia Type:  MAC    Note:  Disposition: Outpatient   Postop Pain Control: Uneventful            Sign Out: Well controlled pain   PONV: No   Neuro/Psych: Uneventful            Sign Out: Acceptable/Baseline neuro status   Airway/Respiratory: Uneventful            Sign Out: Acceptable/Baseline resp. status   CV/Hemodynamics: Uneventful            Sign Out: Acceptable CV status; No obvious hypovolemia; No obvious fluid overload   Other NRE: NONE   DID A NON-ROUTINE EVENT OCCUR? No           Last vitals:  Vitals Value Taken Time   BP 91/50 06/02/23 1200   Temp 36.3  C (97.3  F) 06/02/23 1130   Pulse 47 06/02/23 1200   Resp 16 06/02/23 1200   SpO2 100 % 06/02/23 1214   Vitals shown include unvalidated device data.    Electronically Signed By: Armando Carlson MD  June 2, 2023  12:15 PM

## 2023-06-02 NOTE — ANESTHESIA CARE TRANSFER NOTE
Patient: Toma Blanco    Procedure: Procedure(s):  DILATION AND CURETTAGE, UTERUS, USING SUCTION       Diagnosis: Missed ab [O02.1]  Diagnosis Additional Information: No value filed.    Anesthesia Type:   MAC     Note:    Oropharynx: oropharynx clear of all foreign objects and spontaneously breathing  Level of Consciousness: drowsy  Oxygen Supplementation: face mask  Level of Supplemental Oxygen (L/min / FiO2): 8  Independent Airway: airway patency satisfactory and stable  Dentition: dentition unchanged  Vital Signs Stable: post-procedure vital signs reviewed and stable  Report to RN Given: handoff report given  Patient transferred to: Phase II    Handoff Report: Identifed the Patient, Identified the Reponsible Provider, Reviewed the pertinent medical history, Discussed the surgical course, Reviewed Intra-OP anesthesia mangement and issues during anesthesia, Set expectations for post-procedure period and Allowed opportunity for questions and acknowledgement of understanding      Vitals:  Vitals Value Taken Time   BP 94/51 06/02/23 1130   Temp 36.3  C (97.3  F) 06/02/23 1130   Pulse 54 06/02/23 1130   Resp 12 06/02/23 1130   SpO2 100 % 06/02/23 1133   Vitals shown include unvalidated device data.    Electronically Signed By: ROSITA Waldrop CRNA  June 2, 2023  11:34 AM

## 2023-06-02 NOTE — DISCHARGE INSTRUCTIONS
Nothing in the vagina until the bleeding stops.    Use Ibuprofen for cramping, up to 800mg (4 of the over the counter tablets) every 8 hours. Do a pregnancy test 3 weeks after D&C to ensure that all pregnancy tissue has passed, call or email if it's still positive.    Use condoms until next menses, (about 5 weeks from now)  then it's okay to start trying to become pregnant again.          Discharge Instructions: Following a Dilation and Curettage    What to expect:  Expect small to moderate amount of vaginal bleeding which should taper off in 4-5 days. It should not be heavier than your regular menstrual flow.  Do not douche, and use a pad rather than tampons.   No intercourse until bleeding has ceased.    Activity:  Rest the day of surgery. You may resume normal activity the next day.  You may bathe or shower.  Avoid heavy lifting (10-15 lbs) for one week.    Comfort:  The amount of discomfort you can expect is very unpredictable. If you have pain that cannot be controlled with non-aspirin pain relievers or with the prescription you may have received, you should notify your doctor.  Abdominal cramping (like menstrual cramps) or low back ache are common and should not be a cause for concern. You will be drowsy and weak the day of surgery and possibly the following day.    Diet:  You have no restrictions on your diet. Following surgery, drink plenty of fluids and eat a light meal.    Nausea:  The anesthesia medications you received during your surgical procedure may produce some nausea.  If you feel nauseated, stay in bed, keep your head down and try drinking fluids such as Seven-Up, tea or soup.    Notify Physician at once if you experience:  A fever over 100.4 degrees (a low grade fever under 100 degrees is usual after surgery).  Heavy flow and/or passing large clots. Saturating more than 1 pad per hour for 2 or more hours.   Severe pain or cramps.    Important numbers  [facility] ([address])  - [place]: [phone]   [facility] ([address]) : [phone]  Rev.          Same-Day Surgery   Adult Discharge Orders & Instructions     For 24 hours after surgery:  Get plenty of rest.  A responsible adult must stay with you for at least 24 hours after you leave the hospital.   Pain medication can slow your reflexes. Do not drive or use heavy equipment.  If you have weakness or tingling, don't drive or use heavy equipment until this feeling goes away.  Mixing alcohol and pain medication can cause dizziness and slow your breathing. It can even be fatal. Do not drink alcohol while taking pain medication.  Avoid strenuous or risky activities.  Ask for help when climbing stairs.   You may feel lightheaded.  If so, sit for a few minutes before standing.  Have someone help you get up.   If you have nausea (feel sick to your stomach), drink only clear liquids such as apple juice, ginger ale, broth or 7-Up.  Rest may also help.  Be sure to drink enough fluids.  Move to a regular diet as you feel able. Take pain medications with a small amount of solid food, such as toast or crackers, to avoid nausea.   A slight fever is normal. Call the doctor if your fever is over 100 F (37.7 C) (taken under the tongue) or lasts longer than 24 hours.  You may have a dry mouth, muscle aches, trouble sleeping or a sore throat.  These symptoms should go away after 24 hours.  Do not make important or legal decisions.   Pain Management:      1. Take pain medication (if prescribed) for pain as directed by your physician.        2. WARNING: If the pain medication you have been prescribed contains Tylenol (acetaminophen), DO NOT take additional doses of Tylenol (acetaminophen).     Call your doctor for any of the followin.  Signs of infection (fever, growing tenderness at the surgery site, severe pain, a large amount of drainage or bleeding, foul-smelling drainage, redness, swelling).    2.  It has been  over 8 to 10 hours since surgery and you are still not able to urinate (pee).    3.  Headache for over 24 hours.    4.  Numbness, tingling or weakness the day after surgery (if you had spinal anesthesia).  To contact a doctor, call ______Dr. Leone [facility], [phone]_________ or:  '   [phone] and ask for the Resident On Call for:          _______OB/GYN___________________ (answered 24 hours a day)  '   Emergency Department:  [place] [facility]: [phone]  [place] [facility]: [phone]               Rev. 10/2014

## 2023-06-02 NOTE — OP NOTE
Gynecology Operative Note    2023     Pre-operative diagnosis:  Incomplete  at 6 weeks  Rh positive    Post-operative diagnosis:  Incomplete  s/p suction dilation and curretage    Procedure: Suction D&C    Surgeon: Dr. Leone    Anesthesia: MAC with local anesthetic for paracervical block    EBL: 10 mL  IVF: see anesthesia record  UOP: none; voided prior to procedure  Intraoperative medications: 20mL 1% lidocaine paracervical    Complications: None apparent  Specimens: Products of conception    Findings: EUA revealed retroverted uterus, ~ 7 week size, mobile with no adnexal masses. Cervix fingertip dilated. Dilated to 8 mm with sequential dilators. Large amount of products of conception returned on multiple passes. Uterine wall gritty on all aspects. Tenaculum sites hemostatic at end of case.    Indications: Toma Blanco  at 8w3d who was found to have missed  measuring 6 wks. She had vaginal bleeding on and off for the last week. Medical versus surgical management was discussed and she desired surgical management. Risks, benefits and alternatives to above stated procedure were discussed. Patient desired to proceed and written informed consent was obtained prior to proceeding.      Procedure: Patient was taken to the OR where she was placed under MAC anesthesia without difficulty. SCDs were placed prior to anesthesia. Doxy IV was given. Once patient was comfortable she was positioned in the dorsal lithotomy position with her legs up in stirrups.  Exam under anesthesia was performed with findings as above. She was then prepped and draped in the normal sterile fashion.  A presurgical pause was completed and the proper patient and procedure were identified.  A sterile speculum was placed in the vagina and the cervix was visualized.  A single toothed tenaculum was placed on the anterior lip. The 5 and 7 o'clock positions of the cervical vaginal junction were then injected  with 10mL of  1% lidocaine on each side.  The uterus was not sounded.  The cervix was progressively dilated with half size dilators to 8.  A size 8 mm suction tip was then placed into the cervical canal without difficulty.  Multiple passes were made with good return of tissue noted. The suction currette was used to curette until all aspects of endometrium were noted to be gritty.  The tenaculum was removed and sites noted to be hemostatic after pressure. The speculum was then removed. The patient tolerated the procedure well and was taken to the PACU for recovery in stable condition. Instrument, needle and sponge counts correct x2.      Lulu Leone MD

## 2023-06-03 ENCOUNTER — HEALTH MAINTENANCE LETTER (OUTPATIENT)
Age: 33
End: 2023-06-03

## 2023-06-04 ENCOUNTER — NURSE TRIAGE (OUTPATIENT)
Dept: NURSING | Facility: CLINIC | Age: 33
End: 2023-06-04
Payer: COMMERCIAL

## 2023-06-04 ENCOUNTER — HOSPITAL ENCOUNTER (EMERGENCY)
Facility: CLINIC | Age: 33
Discharge: HOME | End: 2023-06-04
Attending: PHYSICIAN ASSISTANT | Admitting: PHYSICIAN ASSISTANT
Payer: COMMERCIAL

## 2023-06-04 ENCOUNTER — APPOINTMENT (OUTPATIENT)
Dept: ULTRASOUND IMAGING | Facility: CLINIC | Age: 33
End: 2023-06-04
Attending: PHYSICIAN ASSISTANT
Payer: COMMERCIAL

## 2023-06-04 VITALS
WEIGHT: 245 LBS | TEMPERATURE: 97.5 F | DIASTOLIC BLOOD PRESSURE: 67 MMHG | HEIGHT: 70 IN | RESPIRATION RATE: 16 BRPM | SYSTOLIC BLOOD PRESSURE: 111 MMHG | BODY MASS INDEX: 35.07 KG/M2 | HEART RATE: 64 BPM | OXYGEN SATURATION: 98 %

## 2023-06-04 DIAGNOSIS — E04.1 THYROID NODULE: ICD-10-CM

## 2023-06-04 DIAGNOSIS — I80.8 SUPERFICIAL THROMBOPHLEBITIS OF RIGHT UPPER EXTREMITY: ICD-10-CM

## 2023-06-04 PROCEDURE — 99284 EMERGENCY DEPT VISIT MOD MDM: CPT | Mod: 25

## 2023-06-04 PROCEDURE — 93971 EXTREMITY STUDY: CPT | Mod: RT

## 2023-06-04 RX ORDER — CEPHALEXIN 500 MG/1
500 CAPSULE ORAL 4 TIMES DAILY
Qty: 28 CAPSULE | Refills: 0 | Status: SHIPPED | OUTPATIENT
Start: 2023-06-04 | End: 2023-06-11

## 2023-06-04 ASSESSMENT — ACTIVITIES OF DAILY LIVING (ADL)
ADLS_ACUITY_SCORE: 33
ADLS_ACUITY_SCORE: 35

## 2023-06-04 NOTE — TELEPHONE ENCOUNTER
Nurse Triage SBAR    Is this a 2nd Level Triage? No - patient will go to UC or ED    Situation/Background: Patient is calling regarding new pain and redness at IV site on right hand/arm. IV was in right hand. IV site has new puffiness, redness with a line of red following the vein.    Assessment:   Patient has not checked temperature, does not feel that she has a fever   Has puffiness that extends from hand to wrist which extends to the outer aspect of the arm.    Recommendation: Per disposition, See HCP (or PCP Triage) within 4 hours. Abe stated she will go to ED. UC is also an option, per dispostion. Advised patient to call back with any new or worsening symptoms. Patient verbalized understanding and agrees with plan.    Protocol Recommended Disposition: Urgent care center    Magdalena Herrera RN on 6/4/2023 at 12:00 PM  [facility]    Reason for Disposition    Pain, redness, swelling, or pus at IV Site    [1] Red streak at IV site AND [2] longer than 1 inch (2.5 cm)    Additional Information    Negative: Sounds like a life-threatening emergency to the triager    Negative: Chest pain    Negative: Difficulty breathing    Negative: Acting confused (e.g., disoriented, slurred speech) or excessively sleepy    Negative: Post-Op tonsil and adenoid surgery, symptoms or questions about    Negative: Surgical incision symptoms and questions    Negative: [1] Pain or burning with passing urine (urination) AND [2] male    Negative: [1] Pain or burning with passing urine (urination) AND [2] female    Negative: Constipation    Negative: New or worsening leg (calf, thigh) pain    Negative: New or worsening leg swelling    Negative: Dizziness is severe, or persists > 24 hours after surgery    Negative: SEVERE difficulty breathing (e.g., struggling for each breath, speaks in single words)    Negative: Shock suspected (e.g., cold/pale/clammy skin, too weak to stand, low BP, rapid pulse)    Negative: Cracked or  broken central line or PICC Line    Negative: Sounds like a life-threatening emergency to the triager    Negative: IV not running or running slowly    Negative: [1] Difficulty breathing AND [2] not severe    Negative: Moderate bleeding around IV site  (Exception: Mild bleeding that stops quickly with direct pressure)    Negative: [1] Chest pain AND [2] has central or PICC line    Negative: [1] Chest or neck swelling AND [2] has a central or PICC line  (Exception: Mild puffiness or swelling just around IV site.)    Negative: [1] Arm or leg swelling AND [2] has a central or PICC line  (Exception: Mild puffiness or swelling just around IV site.)    Negative: Fever > 100.4 F (38.0 C)    Negative: Patient sounds very sick or weak to the triager    Negative: Central or PICC line has moved out of position (or can see cuff slipping out of skin; or more line externally exposed than before)    Negative: Pus or cloudy fluid from IV site    Negative: [1] Red streak at IV site AND [2] palpable cord    Protocols used: POST-OP SYMPTOMS AND [id number], IV SITE AND OTHER SYMPTOMS-A-AH

## 2023-06-04 NOTE — ED PROVIDER NOTES
"  History     Chief Complaint:  Wound Check       The history is provided by the patient.      Toma Blanco is a 32 year old female with a history of DVT who presents for a wound check with concerns for a potential blood clot. The patient notes that she had a recent D&C surgery on Friday and since then, she has noticed a posterior right forearm rash that radiates around her forearm. She denies fever, pelvic pain, chest pain, or shortness of breath. Patient is otherwise healthy.  She note she has had minimal bleeding feels otherwise well.  Pts first DVT was in immediate post-partum perioid after her child.  No leg pain or swelling.  She is no longer on anticoagulation.    Independent Historian:   None - Patient Only    Review of External Notes:   I reviewed D&C note Dr. Lulu Bush from 6/2 and note uncomplicated D&C for approximately 8 weeks 7-day pregnancy.    Medications:    Prenatal Vitamins    Past Medical History:    Acute DVT  Anxiety  Hydradenitis suppurativa  COVID-19  Nephrolithiasis  Plantar fascial fibromatosis  Varicella   Elevated BMI   UTI  Vulval hidradenitis suppurativa  Anxiety    Past Surgical History:    Excise mass foot  [place] teeth extraction     Physical Exam     Patient Vitals for the past 24 hrs:   BP Temp Temp src Pulse Resp SpO2 Height Weight   06/04/23 1319 119/69 97.5  F (36.4  C) Temporal 72 16 98 % 1.778 m (5' 10\") 111.1 kg (245 lb)      Physical Exam  General: Awake, alert, non-toxic.  Head:  Scalp is NC/AT  Eyes:  Conjunctiva normal, PERRL  ENT:  The external nose and ears are normal.   Neck:  Normal range of motion without rigidity.  CV:  Regular rate and rhythm    No pathologic murmur, rubs, or gallops.  Resp:  Breath sounds are clear bilaterally    Non-labored, no retractions or accessory muscle use  Abdomen: Abdomen is soft, no distension, no tenderness, no masses.   MS:  There is mild warmth redness and streaking to the right forearm following basilic venous path.  " This is mildly tender.  No fluctuance.  No crepitus.  Compartments soft.  No more proximal edema.  Ranging all joints freely.  No lower extremity edema/swelling.   Skin:  Warm and dry, No rash or lesions noted.  2+ radial pulses bilaterally cap refill less than 2 seconds bilaterally and hands.  Neuro: Alert and oriented.  GCS 15 Moves all extremities normal.  No facial asymmetry. Gait normal.  Psych:  Awake. Alert. Normal affect. Appropriate interactions.      Emergency Department Course     Imaging:  US Upper Extremity Venous Duplex Right   Final Result   IMPRESSION:   1.  No deep venous thrombosis in the right upper extremity.   2.  Superficial venous thrombophlebitis in the basilic vein in the proximal to distal forearm.   3.  Nodular right thyroid lobe. Dedicated ultrasound is recommended.      Dr. Toney confirmed receiving the report at 5:45 PM on 2023         Report per radiology    Laboratory:  None     Emergency Department Course & Assessments:    Interventions:  Medications - No data to display     Independent Interpretation (X-rays, CTs, rhythm strip):  None    Assessments:/Consultations/Discussion of Management or Tests:  ED Course as of 23   Sun 2023   1622 I obtained the patient's history and examined as noted above.     C/w Dr. Lauren from Hematology/Oncology.      Social Determinants of Health affecting care:   None    Disposition:  The patient was discharged to home.     Impression & Plan      CMS Diagnoses: None    Medical Decision Makin-year-old female with history of past DVT provoked by pregnancy no longer on anticoagulation who presents for evaluation of right arm redness and discomfort after she had had a IV catheter placed in that for her D&C 2 days ago.  Ultrasound demonstrates superficial thrombophlebitis which is occlusive of the basilic vein.  No evidence of DVT and no symptoms of PE or lower extremity thrombus.      Given patient's past history of DVT in  the setting of postpartum period putting her at higher risk for thrombosis and clotting I did elect to discuss whether or not to initiate anticoagulation with heme-onc who advised that would be reasonable to start lower dose Eliquis or Xarelto to help symptom wise as well as prevent any progression of thrombus but they could likely be a very short course.  Also reasonable to hold off and treat with NSAIDs initially.  After discussion of risks and benefits with the patient she would like to start low-dose anticoagulation.  I will write her for a 2-week supply for now we will have her follow-up with her PCP for reevaluation and to discuss whether to continue.  Recent kidney function within the last month is normal.  She is not pregnant as she just had D&C she is aware that this could cause some risk of bleeding with her recent surgery 2 days ago but she is comfortable with this heme-onc also feels reasonable and she is having minimal bleeding at this time.  We will avoid NSAIDs.  I will cover her with Keflex for possible very mild cellulitis/septic thrombophlebitis.  She will return if chest pain shortness of breath increased arm pain or swelling spreading redness fever or other concerns.    Diagnosis:    ICD-10-CM    1. Superficial thrombophlebitis of right upper extremity  I80.8            Discharge Medications:  New Prescriptions    No medications on file      Scribe Disclosure:  I, Hanna Ortiz, am serving as a scribe at 4:13 PM on 6/4/2023 to document services personally performed by Yinka Toney PA-C based on my observations and the provider's statements to me.     Scribe Trainer Disclosure:  I, DENNIS MILES, am serving as a  at 4:45 PM on 6/4/2023 to document services personally performed by Yinka Toney PA-C based on my observations and the provider's statements to me.   6/4/2023   Yinka Toney, * Yinka Waterman PA-C  06/04/23 2056

## 2023-06-04 NOTE — ED TRIAGE NOTES
Pt. Had a D&C on Friday, today noticed redness going up arm from IV site. Pt. States she has had a blood clot in the past and concerned about blood clot vs. Infection.     Triage Assessment     Row Name 06/04/23 1320       Triage Assessment (Adult)    Airway WDL WDL       Respiratory WDL    Respiratory WDL WDL       Skin Circulation/Temperature WDL    Skin Circulation/Temperature WDL X  right hand to forearm red streak       Cardiac WDL    Cardiac WDL WDL       Peripheral/Neurovascular WDL    Peripheral Neurovascular WDL WDL       Cognitive/Neuro/Behavioral WDL    Cognitive/Neuro/Behavioral WDL WDL

## 2023-06-09 ENCOUNTER — NURSE TRIAGE (OUTPATIENT)
Dept: NURSING | Facility: CLINIC | Age: 33
End: 2023-06-09
Payer: COMMERCIAL

## 2023-06-09 NOTE — TELEPHONE ENCOUNTER
Call from patient who had a D&C last week and says post op, she has been doing well until today. She noticed in her lower right abdominal area (near her hip) there is a pulsating feeling. Patient says there is no tenderness to area. She does not have a fever or heavy vaginal bleeding. Denies nausea/vomiting/diarrhea.    Assessment/Disposition: home care  Advised since she has no concerning symptoms beyond the pulsing, patient to monitor and call back.    Reviewed care advice with caller per RN triage protocol guideline.  Advised to call back with worsening symptoms, concerns or questions. Caller verbalized understanding.      Horacio Burns, RN, BSN  Triage Nurse Advisor        Reason for Disposition    Other post-op symptom or question    Additional Information    Negative: Sounds like a life-threatening emergency to the triager    Negative: [1] Widespread rash AND [2] bright red, sunburn-like    Negative: [1] SEVERE headache AND [2] after spinal (epidural) anesthesia    Negative: [1] Vomiting AND [2] persists > 4 hours    Negative: [1] Vomiting AND [2] abdomen looks much more swollen than usual    Negative: [1] Drinking very little AND [2] dehydration suspected (e.g., no urine > 12 hours, very dry mouth, very lightheaded)    Negative: Patient sounds very sick or weak to the triager    Negative: Sounds like a serious complication to the triager    Negative: Fever > 100.4 F (38.0 C)    Negative: [1] SEVERE post-op pain (e.g., excruciating, pain scale 8-10) AND [2] not controlled with pain medications    Negative: [1] Caller has URGENT question AND [2] triager unable to answer question    Negative: [1] Headache AND [2] after spinal (epidural) anesthesia AND [3] not severe    Negative: Fever present > 3 days (72 hours)    Negative: [1] MILD-MODERATE post-op pain (e.g., pain scale 1-7) AND [2] not controlled with pain medications    Negative: [1] Caller has NON-URGENT question AND [2] triager unable to answer question     Negative: General activity, questions about    Negative: Resuming driving, questions about    Negative: Resuming sexual relations, questions about    Negative: Getting the incision wet, questions about    Negative: Throat pain after surgery, questions about    Negative: [1] Vomiting AND [2] present < 4 hours    Protocols used: POST-OP SYMPTOMS AND [id number]

## 2023-06-12 ENCOUNTER — TELEPHONE (OUTPATIENT)
Dept: OBGYN | Facility: CLINIC | Age: 33
End: 2023-06-12
Payer: COMMERCIAL

## 2023-06-12 NOTE — TELEPHONE ENCOUNTER
Reason for call:  Symptom   Symptom or request: Patient resumed sexual intercourse with partner after bleeding from d&c stopped. Patient states that light bleeding and occasionally cramped has resumed. Patient would like advisement on if she should be seen/how to proceed.     Duration (how long have symptoms been present): 1 day  Have you been treated for this before? No    Additional comments: n/a    Phone number to reach patient:  Home number on file [phone] (home)    Best Time:  Anytime     Can we leave a detailed message on this number?  YES    Travel screening: Not Applicable

## 2023-06-12 NOTE — CONFIDENTIAL NOTE
Called Toma regarding symptoms.  Pt stated that her bleeding was light/spotting.  Pt advised to monitor bleeding and gave bleeding precautions, filling a pad - front to back and side to side in less than 1 hour x 2 hours. Pt verbalized understanding.  Pt had questions about intercourse and whether to abstain or ok to not have protection.  Pt was advised that it is recommended to use protection or abstain.  Pt verbalized understanding.

## 2023-06-21 ENCOUNTER — MYC MEDICAL ADVICE (OUTPATIENT)
Dept: OBGYN | Facility: CLINIC | Age: 33
End: 2023-06-21
Payer: COMMERCIAL

## 2023-06-21 DIAGNOSIS — I80.9 THROMBOPHLEBITIS DUE TO PROCEDURE: Primary | ICD-10-CM

## 2023-06-21 DIAGNOSIS — T81.72XA THROMBOPHLEBITIS DUE TO PROCEDURE: Primary | ICD-10-CM

## 2023-06-25 ENCOUNTER — TELEPHONE (OUTPATIENT)
Dept: OBGYN | Facility: CLINIC | Age: 33
End: 2023-06-25
Payer: COMMERCIAL

## 2023-06-25 DIAGNOSIS — T81.72XA THROMBOPHLEBITIS DUE TO PROCEDURE: Primary | ICD-10-CM

## 2023-06-25 DIAGNOSIS — I80.9 THROMBOPHLEBITIS DUE TO PROCEDURE: Primary | ICD-10-CM

## 2023-07-24 ENCOUNTER — OFFICE VISIT (OUTPATIENT)
Dept: HEMATOLOGY | Facility: CLINIC | Age: 33
End: 2023-07-24
Attending: OBSTETRICS & GYNECOLOGY
Payer: COMMERCIAL

## 2023-07-24 VITALS
DIASTOLIC BLOOD PRESSURE: 80 MMHG | WEIGHT: 243.9 LBS | OXYGEN SATURATION: 100 % | TEMPERATURE: 98 F | BODY MASS INDEX: 35 KG/M2 | HEART RATE: 71 BPM | SYSTOLIC BLOOD PRESSURE: 134 MMHG

## 2023-07-24 DIAGNOSIS — T81.72XA THROMBOPHLEBITIS DUE TO PROCEDURE: ICD-10-CM

## 2023-07-24 DIAGNOSIS — Z86.718 PERSONAL HISTORY OF DVT (DEEP VEIN THROMBOSIS): Primary | ICD-10-CM

## 2023-07-24 DIAGNOSIS — I80.9 THROMBOPHLEBITIS DUE TO PROCEDURE: ICD-10-CM

## 2023-07-24 DIAGNOSIS — E04.1 NODULAR THYROID DISEASE: ICD-10-CM

## 2023-07-24 LAB — TSH SERPL DL<=0.005 MIU/L-ACNC: 1.29 UIU/ML (ref 0.3–4.2)

## 2023-07-24 PROCEDURE — 85306 CLOT INHIBIT PROT S FREE: CPT | Performed by: PHYSICIAN ASSISTANT

## 2023-07-24 PROCEDURE — 86147 CARDIOLIPIN ANTIBODY EA IG: CPT | Performed by: PHYSICIAN ASSISTANT

## 2023-07-24 PROCEDURE — 85303 CLOT INHIBIT PROT C ACTIVITY: CPT | Performed by: PHYSICIAN ASSISTANT

## 2023-07-24 PROCEDURE — G0463 HOSPITAL OUTPT CLINIC VISIT: HCPCS | Performed by: PHYSICIAN ASSISTANT

## 2023-07-24 PROCEDURE — 99215 OFFICE O/P EST HI 40 MIN: CPT | Performed by: PHYSICIAN ASSISTANT

## 2023-07-24 PROCEDURE — 84443 ASSAY THYROID STIM HORMONE: CPT | Performed by: PHYSICIAN ASSISTANT

## 2023-07-24 PROCEDURE — 36415 COLL VENOUS BLD VENIPUNCTURE: CPT | Performed by: PHYSICIAN ASSISTANT

## 2023-07-24 PROCEDURE — 85300 ANTITHROMBIN III ACTIVITY: CPT | Performed by: PHYSICIAN ASSISTANT

## 2023-07-24 PROCEDURE — 86146 BETA-2 GLYCOPROTEIN ANTIBODY: CPT | Performed by: PHYSICIAN ASSISTANT

## 2023-07-24 PROCEDURE — 85390 FIBRINOLYSINS SCREEN I&R: CPT | Mod: 26 | Performed by: PATHOLOGY

## 2023-07-24 PROCEDURE — 85730 THROMBOPLASTIN TIME PARTIAL: CPT | Performed by: PHYSICIAN ASSISTANT

## 2023-07-24 NOTE — PATIENT INSTRUCTIONS
[facility]  [facility], [address], [place] [address]  Main: [phone], Fax: [phone]    Toma,   It was a pleasure seeing you today.  Thank you for allowing us to be involved in your care.  Please let us know if there is anything else we can do for you, so that we can be sure you are leaving completely satisfied with your care experience. Below is the plan that we discussed.     At positive pregnancy test, call our office so we can get you a prescription for Lovenox   I will contact you via [employer] with your lab and imaging results as I get them. If any abnormal blood work for blood clotting disorders we will set up a virtual visit to discuss further.   If you are traveling, wear compression socks and walk at least every 1-2 hours. Stay hydrated.   We will help you set up the thyroid imaging and I placed a referral to endocrinology.     We would like a provider on our team to see you at least annually for optimal care and to allow us to continue to prescribe for you.      Return to clinic as needed     Your nurse clinician is Cassandra Bernard, MSN, RN, [phone].   If they are unavailable and you have immediate concerns, please call [phone] and ask for a nurse.     Magdalena Collins, MPH, PA-C  [facility]

## 2023-07-24 NOTE — PROGRESS NOTES
[facility]  [facility] [place] [address]  Phone: [phone], Fax: [phone]    Outpatient Visit Note:    Patient: Toma Blanco  MRN: [id number]  : 1990  GERI: 2023    Reason for Consultation:  Toma Blanco is a referred for evaluation of venous thromboembolism and superficial vein thrombosis.     Assessment:  In summary, Toma Blanco is a 32 year old female with past medical history significant for hidradenitis suppurativa, post partum DVT and recent superficial thrombophlebitis after D&C procedure who presents today for hypercoagulable evaluation. Toma's family history is notable for paternal uncle and paternal great aunt who both had fatal pulmonary emboli. Toma would like to have future pregnancies and thus is inquiring about additional inheritable thrombophilia evaluation. Her FVL and prothrombin gene mutation testing was negative at outside system.     Plan:  Majority of today's visit was spent counseling the patient regarding estrogen provoked venous thromboembolism and superficial vein thrombosis.   Given fatal events in second degree family members, recommend inheritable thrombophilia evaluation to include protein C, S, and antithrombin deficiencies.   Will also check for antiphospholipid antibodies.   Given left lower extremity positional symptoms, would be prudent to rule out May-Thurner anatomy.   With future pregnancies, I recommend LMWH at prophylactic intensity. I recommend initiation at first positive pregnancy test. Would recommend Lovenox 40mg once daily. Would potentially consider intermediate intensity if found to have higher risk thrombophilia.   Thyroid nodularity noted on recent US. Recommended dedicated imaging. Will update thyroid labs. Refer to endocrine.     The patient is given our center's contact information and is instructed to call if she should have any further questions or concerns.  Otherwise, we  will plan on seeing her back with positive pregnancy test.      Patient understands and agrees with the above plan and recommendation.      Magdalena Collins, MPH, PA-C  [facility]    60 minutes spent by me on the date of the encounter doing chart review, review of outside records, review of test results, interpretation of tests, patient visit, and documentation     ----------------------------------------------------------------------------------------------------------------------  History of Present Illness:  Toma Blanco is a 32 year old female [id number] past medical history significant for hidradenitis suppurativa, venous thromboembolism and pregnancy loss who presents today given history of postpartum venous thromboembolism and recennt superficial vein thrombosis.     Toma has a history of postpartum distal left lower extremity DVT that was diagnosed around 6 weeks post partum on 2021.  She delivered her son prematurely on 2021. Her son's birth was complicated by multiple anatomical anomalies and polyhydramnios causing  labor. She delivered by vacuum assisted vaginal delivery. Her son, Zack, was in NICU for three months after birth. She was seen in urgent care on 2021 for evaluation of left lower extremity pain and was found to have left calf DVT of posterior tibial and peroneal veins. She was started on Lovenox bridge to warfarin due to breastfeeding. She ultimately changed to Eliquis after she had difficulty keeping INR in therapeutic range (presumed rapid metabolizer) and stopped pumping. She was seen again at the ER on 2021 due to worsening paresthesias of the left leg and repeat ultrasound showed decreased clot burden with small residual DVT in one posterior tibial vein in the calf.   She was treated for three months. Repeat imaging at that time showed resolution of thrombus. She discontinued anticoagulation.      She did have recent positive pregnancy test. An ultrasound was completed 2023 due to vaginal bleeding and pelvic US showed fetal pole and FHR at 68. Repeat ultrasound the next day showed large subchorionic hemorrhage without fetal heart beat. Estimated gestational age of 8w6d. She had not yet been started on anticoagulation as this was planned to be initiated around 10 weeks. She ultimately had D&C on 2023. Two days after her procedure she presented to the ER and was found to have superficial thrombophlebitis of the right upper extremity from IV access at procedure two days prior. US showed no DVT of right upper extremity but superficial vein thrombosis of basilic vein in the proximal to distal forearm. She was treated with Eliquis for 1 week at 5mg twice daily with significant symptom improvement. An incidental finding of nodular thyroid was found on her US examination and she inquires about additional follow up for this. There is a family history of hypothyroidism.     Toma is a non smoker. She is up to date with age appropriate malignancy screenings. She was briefly on Marya in  for two months but did not tolerate it well. She does not use pharmacological contraceptive therapy but uses natural family planning. She has no history of significant varicosities. Prior surgical history includes excision of right plantar foot mass in 2023 for which she was non weight bearing and took Eliquis prophylactically for a week. She also had kidney stone surgery in 2023 and tolerated this well without clotting concerns. She has tolerated anticoagulation in the past without bleeding issues.     She notes left leg paresthesias and pain intermittently. Usually this is associated with the seated position. She notes rare edema.     Toma's family history is notable for fatal venous thromboembolism in multiple family members. Toma's paternal uncle  of a pulmonary embolism in his 60's following knee surgery. Her  paternal great aunt  of a pulmonary embolism in pregnancy. Her maternal grandmother has history of superficial vein thrombosis but no history of deep vein thrombosis. Toma has three brothers and one sister, none of whom have had clotting events. Her son, Zack, has had multiple procedures and not had any issues with bleeding or clotting.     Hypercoagulable work up through [facility] showed she was negative for factor V Leiden and prothrombin gene mutation.   It does not appear that she had protein C, S, or antithrombin levels completed. No APS testing completed.     She would like to have additional children.       Past Medical History:  Past Medical History:   Diagnosis Date    Acute deep vein thrombosis (DVT) of proximal vein of left lower extremity (H)     post partum    Anxiety     Hidradenitis suppurativa     Infection due to 2019 novel coronavirus     Nephrolithiasis     Plantar fascial fibromatosis of right foot     Varicella        Past Surgical History:  Past Surgical History:   Procedure Laterality Date    DILATION AND CURETTAGE SUCTION N/A 2023    Procedure: DILATION AND CURETTAGE, UTERUS, USING SUCTION;  Surgeon: Lulu Leone MD;  Location: [facility] OR    EXCISE MASS FOOT Right 2023    Procedure: Excision and Neuroplasty of Right Plantar Foot Mass;  Surgeon: Hunter Redding MD;  Location: [facility] OR    B[place] teeth extraction         Medications:  Current Outpatient Medications   Medication Sig Dispense Refill    Prenatal Vit-DSS-Fe Cbn-FA (PRENATAL AD PO)           Allergies:  No Known Allergies    ROS:  Remainder of a comprehensive 14 point ROS is negative unless noted above.    Social History:  Denies any tobacco use. No significant alcohol use. Denies any illicit drug use.     Surgical History:   - excision of nerve tumor in 2023 right side - non weight bearing x 3 weeks. Did take a week of Eliquis while immobilized   - Kidney stone removal 2023  - Berkley tooth extraction   -  D&C    Family History:  See HPI     Objectives:  Vitals: /80 (BP Location: Right arm, Patient Position: Sitting, Cuff Size: Adult Large)   Pulse 71   Temp 98  F (36.7  C) (Oral)   Wt 110.6 kg (243 lb 14.4 oz)   LMP 2023   SpO2 100%   BMI 35.00 kg/m     Exam:   Constitutional: Appears well, no distress  HEENT: Pupils equal and round. No scleral icterus or hemorrhage.   Respiratory: no increased work of breathing.   Mus/Skele: no edema of lower extremities  Skin: no petechiae, no ecchymosis on exposed dermis.  Neuro: CN II-XII intact. Normal gait. AOx3      Labs:  CBC RESULTS:   Recent Labs   Lab Test 23  1034   WBC 11.7*   RBC 4.86   HGB 13.5   HCT 41.5   MCV 85   MCH 27.8   MCHC 32.5   RDW 12.8        Last Comprehensive Metabolic Panel:  Sodium   Date Value Ref Range Status   2023 141 136 - 145 mmol/L Final     Potassium   Date Value Ref Range Status   2023 4.2 3.5 - 5.0 mmol/L Final     Chloride   Date Value Ref Range Status   2023 106 98 - 107 mmol/L Final     Carbon Dioxide (CO2)   Date Value Ref Range Status   2023 23 22 - 31 mmol/L Final     Anion Gap   Date Value Ref Range Status   2023 12 5 - 18 mmol/L Final     Glucose   Date Value Ref Range Status   2023 125 70 - 125 mg/dL Final     Urea Nitrogen   Date Value Ref Range Status   2023 13 8 - 22 mg/dL Final     Creatinine   Date Value Ref Range Status   2023 0.80 0.60 - 1.10 mg/dL Final     GFR Estimate   Date Value Ref Range Status   2023 >90 >60 mL/min/1.73m2 Final     Comment:     eGFR calculated using  CKD-EPI equation.     Calcium   Date Value Ref Range Status   2023 9.6 8.5 - 10.5 mg/dL Final     Liver Function Studies -   Recent Labs   Lab Test 23  2115   PROTTOTAL 7.6   ALBUMIN 4.2   BILITOTAL 0.6   ALKPHOS 61   AST 10   ALT 12         Imagin2021 LLE US   MPRESSION:   1. No residual DVT within the visualized posterior tibial and peroneal  veins.     6/12/2021 LLE US   IMPRESSION:   1. Decrease in clot burden when compared to prior with small focus residual DVT in one posterior tibial vein in the calf.     5/30/2021 LLE US  IMPRESSION:   1. Upper left calf DVT in posterior tibial and peroneal veins.   2.  No deep vein thrombosis above this level in the left popliteal or femoral veins.     6/12/2021   Narrative & Impression   EXAM: US UPPER EXTREMITY VENOUS DUPLEX RIGHT  LOCATION: [facility]     IMPRESSION:  1.  No deep venous thrombosis in the right upper extremity.  2.  Superficial venous thrombophlebitis in the basilic vein in the proximal to distal forearm.  3.  Nodular right thyroid lobe. Dedicated ultrasound is recommended.

## 2023-07-25 LAB
AT III ACT/NOR PPP CHRO: 113 % (ref 85–135)
B2 GLYCOPROT1 IGG SERPL IA-ACNC: 0.9 U/ML
B2 GLYCOPROT1 IGM SERPL IA-ACNC: <2.4 U/ML
CARDIOLIPIN IGG SER IA-ACNC: 15 GPL-U/ML
CARDIOLIPIN IGG SER IA-ACNC: ABNORMAL
CARDIOLIPIN IGM SER IA-ACNC: 5.1 MPL-U/ML
CARDIOLIPIN IGM SER IA-ACNC: NEGATIVE
DRVVT SCREEN RATIO: 0.85
INR PPP: 1.03 (ref 0.85–1.15)
LA PPP-IMP: NEGATIVE
LUPUS INTERPRETATION: NORMAL
PTT RATIO: 1.03
THROMBIN TIME: 16.5 SECONDS (ref 13–19)

## 2023-07-26 ENCOUNTER — HOSPITAL ENCOUNTER (OUTPATIENT)
Dept: ULTRASOUND IMAGING | Facility: CLINIC | Age: 33
Discharge: HOME | End: 2023-07-26
Attending: PHYSICIAN ASSISTANT
Payer: COMMERCIAL

## 2023-07-26 DIAGNOSIS — Z86.718 PERSONAL HISTORY OF DVT (DEEP VEIN THROMBOSIS): ICD-10-CM

## 2023-07-26 DIAGNOSIS — E04.1 NODULAR THYROID DISEASE: ICD-10-CM

## 2023-07-26 PROCEDURE — 76536 US EXAM OF HEAD AND NECK: CPT

## 2023-07-26 PROCEDURE — 93979 VASCULAR STUDY: CPT

## 2023-07-27 LAB
PROT C ACT/NOR PPP CHRO: 111 % (ref 70–170)
PROT S FREE AG ACT/NOR PPP IA: 91 % (ref 55–125)

## 2023-07-31 DIAGNOSIS — Z32.01 PREGNANCY TEST POSITIVE: ICD-10-CM

## 2023-07-31 DIAGNOSIS — Z86.718 PERSONAL HISTORY OF DVT (DEEP VEIN THROMBOSIS): Primary | ICD-10-CM

## 2023-07-31 RX ORDER — ENOXAPARIN SODIUM 100 MG/ML
40 INJECTION SUBCUTANEOUS DAILY
Qty: 12 ML | Refills: 3 | Status: SHIPPED | OUTPATIENT
Start: 2023-07-31 | End: 2023-08-08

## 2023-07-31 NOTE — CONFIDENTIAL NOTE
Prescription for Lovenox 40mg once daily subcutaneous to [facility].   TONG ENAMORADO PA-C on 7/31/2023 at 1:21 PM

## 2023-08-08 ENCOUNTER — DOCUMENTATION ONLY (OUTPATIENT)
Dept: HEMATOLOGY | Facility: CLINIC | Age: 33
End: 2023-08-08
Payer: COMMERCIAL

## 2023-08-08 RX ORDER — ENOXAPARIN SODIUM 100 MG/ML
40 INJECTION SUBCUTANEOUS DAILY
Qty: 12 ML | Refills: 3 | Status: ON HOLD | OUTPATIENT
Start: 2023-08-08 | End: 2023-08-30

## 2023-08-08 NOTE — PROGRESS NOTES
Prior Authorization Form for Specialty & Non-Specialty Drugs through her [facility] pharmacy plan.     URL to PDF: [email or web address]          TOMMIE Love, [facility]  Lead Pharmacy Coordinator  [facility]  [phone]

## 2023-08-09 ENCOUNTER — DOCUMENTATION ONLY (OUTPATIENT)
Dept: HEMATOLOGY | Facility: CLINIC | Age: 33
End: 2023-08-09

## 2023-08-09 ENCOUNTER — VIRTUAL VISIT (OUTPATIENT)
Dept: OBGYN | Facility: CLINIC | Age: 33
End: 2023-08-09
Payer: COMMERCIAL

## 2023-08-09 VITALS — BODY MASS INDEX: 35 KG/M2 | HEIGHT: 70 IN

## 2023-08-09 DIAGNOSIS — Z87.59 HISTORY OF MISCARRIAGE: ICD-10-CM

## 2023-08-09 DIAGNOSIS — O09.91 ENCOUNTER FOR SUPERVISION OF HIGH RISK PREGNANCY IN FIRST TRIMESTER, ANTEPARTUM: Primary | ICD-10-CM

## 2023-08-09 PROCEDURE — 99207 PR NO CHARGE NURSE ONLY: CPT

## 2023-08-09 NOTE — PROGRESS NOTES
Medication/Dose: Enoxaparin 40 mg/0.4 ml (inject 40 mg once daily), approved for 30 syringes, 12 ml/month     If notification received from pharmacy:   Pharmacy name: [facility]   Pharmacy Phone: [phone]  Insurance name: -[employer] ([facility])      PA approved.   Effective dates: 8/9/2023 to 7/9/2024   Case #: [id number]          PA form that was faxed to insurance & last clinic notes from 7/24/2023        TOMMIE Love, [facility]  Lead Pharmacy Coordinator  [facility]  [phone]

## 2023-08-09 NOTE — PROGRESS NOTES
Important Information for Provider:     New ob nurse intake by phone, third pregnancy. Recent SAB/ D&C 2023. Ordered HCG Quants per Dr Michel.. Lab scheduled for 2023. Ultrasound scheduled for 2023 with Dr Diaz to call back with results. NOB with Dr Vela 2023      History of  delivered at 33 weeks(PPROM), DVT 6 weeks postpartum, took anticoagulants for 3 months.  She is waiting for her Lovenox to come through the mail. Handouts reviewed. Declined genetic screening.     Caffeine intake/servings daily - 0  Calcium intake/servings daily - 3  Exercise 5 times weekly - describe ; walks, precautions given  Sunscreen used - Yes  Seatbelts used - Yes  Guns stored in the home - No  Self Breast Exam - Yes  Pap test up to date -  Yes  Dental exam up to date -  Yes  Immunizations reviewed and up to date - Yes  Abuse: Current or Past (Physical, Sexual or Emotional) - No  Do you feel safe in your environment - Yes  Do you cope well with stress - Yes  Prenatal OB Questionnaire  Patient supplied answers from flow sheet for:  Prenatal OB Questionnaire.  Past Medical History  Have you ever received care for your mental health? : No  Have you ever been in a major accident or suffered serious trauma?: No  Within the last year, has anyone hit, slapped, kicked or otherwise hurt you?: No  In the last year, has anyone forced you to have sex when you didn't want to?: No    Past Medical History 2   Have you ever received a blood transfusion?: No  Would you accept a blood transfusion if was medically recommended?: Yes  Does anyone in your home smoke?: No   Is your blood type Rh negative?: No  Have you ever ?: No  Have you been hospitalized for a nonsurgical reason excluding normal delivery?: No  Have you ever had an abnormal pap smear?: No    Past Medical History (Continued)  Do you have a history of abnormalities of the uterus?: No  Did your mother take SONAL or any other hormones when she was pregnant  with you?: No  Do you have any other problems we have not asked about which you feel may be important to this pregnancy?: No            Allergies as of 8/9/2023:    Allergies as of 08/09/2023    (No Known Allergies)       Current medications are:  Current Outpatient Medications   Medication Sig Dispense Refill    enoxaparin ANTICOAGULANT (LOVENOX) 40 MG/0.4ML syringe Inject 0.4 mLs (40 mg) Subcutaneous daily 12 mL 3    Prenatal Vit-DSS-Fe Cbn-FA (PRENATAL AD PO)            Early ultrasound screening tool:    Does patient have irregular periods?  No  Did patient use hormonal birth control in the three months prior to positive urine pregnancy test? No  Is the patient breastfeeding?  No  Is the patient 10 weeks or greater at time of education visit?  No

## 2023-08-13 ENCOUNTER — NURSE TRIAGE (OUTPATIENT)
Dept: NURSING | Facility: CLINIC | Age: 33
End: 2023-08-13
Payer: COMMERCIAL

## 2023-08-13 NOTE — TELEPHONE ENCOUNTER
OB Triage Call      Is patient's OB/Midwife with the [facility] or [facility]? [facility]- Proceed with triage     Reason for call: Pt is having some pain in her right hip that has moved to her abdomen     Assessment: Pt has been having some right hip pain that has moved to her abdomen - pt states that she is 5 weeks pregnant     Rates abdominal pain 2/10    Plan: See PCP Within 24 Hours     Pt will be making an appointment to be evaluated by a provider     5w2d    Estimated Date of Delivery: 2024        OB History    Para Term  AB Living   3 1 0 1 1 1   SAB IAB Ectopic Multiple Live Births   1 0 0 0 1      # Outcome Date GA Lbr Mikie/2nd Weight Sex Delivery Anes PTL Lv   3 Current            2 SAB 23 8w3d    AB, INEVITAB      1  21 33w0d  1.531 kg (3 lb 6 oz) M   Y YVONNE      Complications: Polyhydramnios      Obstetric Comments   First baby: mult anomalies, poly, PTL; no genetic positive test       No results found for: GBS       Ale Kingston RN 23 2:55 PM  [facility] Nurse Advisor      Reason for Disposition   [1] Intermittent lower abdominal pain (e.g., cramping) AND [2] present > 24 hours    Additional Information   Negative: Passed out (i.e., lost consciousness, collapsed and was not responding)   Negative: Shock suspected (e.g., cold/pale/clammy skin, too weak to stand, low BP, rapid pulse)   Negative: Difficult to awaken or acting confused (e.g., disoriented, slurred speech)   Negative: Sounds like a life-threatening emergency to the triager   Negative: Followed an abdomen (stomach) injury   Negative: [1] Abdominal pain AND [2] pregnant 20 or more weeks   Negative: MODERATE-SEVERE abdominal pain (e.g., interferes with normal activities, awakens from sleep)   Negative: [1] SEVERE vaginal bleeding (e.g., soaking 2 pads per hour, large blood clots) AND [2] present 2 or more hours   Negative: [1] MODERATE vaginal bleeding (e.g., soaking 1 pad per hour;  "clots) AND [2] present > 6 hours   Negative: [1] MODERATE vaginal bleeding (e.g., soaking 1 pad per hour; clots) AND [2] pregnant > 12 weeks   Negative: Passed tissue (e.g., gray-white)   Negative: [1] Vomiting AND [2] contains red blood or black (\"coffee ground\") material  (Exception: Few red streaks in vomit that only happened once.)   Negative: Shoulder pain   Negative: Lightheadedness or dizziness (e.g., feels like passing out)   Negative: Patient sounds very sick or weak to the triager   Negative: [1] Constant abdominal pain AND [2] present > 2 hours   Negative: Blood in urine (red, pink, or tea-colored)   Negative: Fever > 100.4 F (38.0 C)   Negative: White of the eyes have turned yellow (i.e., jaundice)    Protocols used: Pregnancy - Abdominal Pain Less Than 20 Weeks EGA-A-AH    "

## 2023-08-14 ENCOUNTER — HOSPITAL ENCOUNTER (EMERGENCY)
Facility: CLINIC | Age: 33
Discharge: HOME | End: 2023-08-14
Admitting: EMERGENCY MEDICINE
Payer: COMMERCIAL

## 2023-08-14 ENCOUNTER — APPOINTMENT (OUTPATIENT)
Dept: ULTRASOUND IMAGING | Facility: CLINIC | Age: 33
End: 2023-08-14
Attending: PHYSICIAN ASSISTANT
Payer: COMMERCIAL

## 2023-08-14 VITALS
HEART RATE: 69 BPM | RESPIRATION RATE: 16 BRPM | WEIGHT: 240 LBS | BODY MASS INDEX: 34.36 KG/M2 | HEIGHT: 70 IN | OXYGEN SATURATION: 96 % | TEMPERATURE: 98.6 F | SYSTOLIC BLOOD PRESSURE: 122 MMHG | DIASTOLIC BLOOD PRESSURE: 59 MMHG

## 2023-08-14 DIAGNOSIS — R10.31 RIGHT LOWER QUADRANT ABDOMINAL PAIN: ICD-10-CM

## 2023-08-14 DIAGNOSIS — M25.511 ACUTE PAIN OF RIGHT SHOULDER: ICD-10-CM

## 2023-08-14 LAB
ALBUMIN SERPL-MCNC: 3.7 G/DL (ref 3.5–5)
ALBUMIN UR-MCNC: NEGATIVE MG/DL
ALP SERPL-CCNC: 50 U/L (ref 45–120)
ALT SERPL W P-5'-P-CCNC: 12 U/L (ref 0–45)
ANION GAP SERPL CALCULATED.3IONS-SCNC: 8 MMOL/L (ref 5–18)
APPEARANCE UR: CLEAR
AST SERPL W P-5'-P-CCNC: 13 U/L (ref 0–40)
BILIRUB DIRECT SERPL-MCNC: 0.2 MG/DL
BILIRUB SERPL-MCNC: 0.2 MG/DL (ref 0–1)
BILIRUB UR QL STRIP: NEGATIVE
BUN SERPL-MCNC: 9 MG/DL (ref 8–22)
CALCIUM SERPL-MCNC: 9.2 MG/DL (ref 8.5–10.5)
CHLORIDE BLD-SCNC: 108 MMOL/L (ref 98–107)
CO2 SERPL-SCNC: 21 MMOL/L (ref 22–31)
COLOR UR AUTO: COLORLESS
CREAT SERPL-MCNC: 0.65 MG/DL (ref 0.6–1.1)
ERYTHROCYTE [DISTWIDTH] IN BLOOD BY AUTOMATED COUNT: 13.5 % (ref 10–15)
GFR SERPL CREATININE-BSD FRML MDRD: >90 ML/MIN/1.73M2
GLUCOSE BLD-MCNC: 90 MG/DL (ref 70–125)
GLUCOSE UR STRIP-MCNC: NEGATIVE MG/DL
HCG SERPL-ACNC: ABNORMAL MLU/ML (ref 0–4)
HCT VFR BLD AUTO: 38.7 % (ref 35–47)
HGB BLD-MCNC: 12.7 G/DL (ref 11.7–15.7)
HGB UR QL STRIP: NEGATIVE
KETONES UR STRIP-MCNC: NEGATIVE MG/DL
LEUKOCYTE ESTERASE UR QL STRIP: NEGATIVE
LIPASE SERPL-CCNC: 29 U/L (ref 0–52)
MCH RBC QN AUTO: 27 PG (ref 26.5–33)
MCHC RBC AUTO-ENTMCNC: 32.8 G/DL (ref 31.5–36.5)
MCV RBC AUTO: 82 FL (ref 78–100)
NITRATE UR QL: NEGATIVE
PH UR STRIP: 6.5 [PH] (ref 5–7)
PLATELET # BLD AUTO: 319 10E3/UL (ref 150–450)
POTASSIUM BLD-SCNC: 3.8 MMOL/L (ref 3.5–5)
PROT SERPL-MCNC: 6.9 G/DL (ref 6–8)
RBC # BLD AUTO: 4.7 10E6/UL (ref 3.8–5.2)
RBC URINE: <1 /HPF
SODIUM SERPL-SCNC: 137 MMOL/L (ref 136–145)
SP GR UR STRIP: 1.01 (ref 1–1.03)
SQUAMOUS EPITHELIAL: 1 /HPF
UROBILINOGEN UR STRIP-MCNC: <2 MG/DL
WBC # BLD AUTO: 9.9 10E3/UL (ref 4–11)
WBC URINE: 1 /HPF

## 2023-08-14 PROCEDURE — 80053 COMPREHEN METABOLIC PANEL: CPT | Performed by: PHYSICIAN ASSISTANT

## 2023-08-14 PROCEDURE — 81001 URINALYSIS AUTO W/SCOPE: CPT | Performed by: PHYSICIAN ASSISTANT

## 2023-08-14 PROCEDURE — 36415 COLL VENOUS BLD VENIPUNCTURE: CPT | Performed by: PHYSICIAN ASSISTANT

## 2023-08-14 PROCEDURE — 85027 COMPLETE CBC AUTOMATED: CPT | Performed by: PHYSICIAN ASSISTANT

## 2023-08-14 PROCEDURE — 76705 ECHO EXAM OF ABDOMEN: CPT

## 2023-08-14 PROCEDURE — 76802 OB US < 14 WKS ADDL FETUS: CPT

## 2023-08-14 PROCEDURE — 76801 OB US < 14 WKS SINGLE FETUS: CPT

## 2023-08-14 PROCEDURE — 84702 CHORIONIC GONADOTROPIN TEST: CPT | Performed by: PHYSICIAN ASSISTANT

## 2023-08-14 PROCEDURE — 82248 BILIRUBIN DIRECT: CPT | Performed by: PHYSICIAN ASSISTANT

## 2023-08-14 PROCEDURE — 83690 ASSAY OF LIPASE: CPT | Performed by: PHYSICIAN ASSISTANT

## 2023-08-14 PROCEDURE — 99285 EMERGENCY DEPT VISIT HI MDM: CPT | Mod: 25

## 2023-08-14 ASSESSMENT — ACTIVITIES OF DAILY LIVING (ADL): ADLS_ACUITY_SCORE: 35

## 2023-08-14 NOTE — DISCHARGE INSTRUCTIONS
You were seen in the emergency department for right lower quadrant abdominal cramping as well as right shoulder pain.  Thankfully, your ultrasound today is reassuring.  You do have 2 cystic structures in the uterus which can be compatible with an early twin pregnancy.  The fetal poles are not yet visible, this can be normal in very early pregnancy.  Your blood hormone level was 27,000 which correlates to your estimated gestation. please follow-up with your OB/GYN for continued monitoring.    Your labs are reassuring including your urine and blood cell counts.  I do not think your pain today represents any kind of infection.  The right lower quadrant is where your appendix is, pain from this can come and go but if it is going to progress to appendicitis you will feel quite ill and uncomfortable.  Come back to the emergency department if this happens.

## 2023-08-14 NOTE — ED TRIAGE NOTES
Patient presents to ED with RLQ that began yesterday and R shoulder pain that she noticed when she got up today, patient is 6 weeks pregnant and concerned about possible ectopic pregnancy.  Roberta Nelson RN.......8/14/2023 9:59 AM     Triage Assessment       Row Name 08/14/23 0958       Triage Assessment (Adult)    Airway WDL WDL       Respiratory WDL    Respiratory WDL WDL       Skin Circulation/Temperature WDL    Skin Circulation/Temperature WDL WDL       Cardiac WDL    Cardiac WDL WDL       Peripheral/Neurovascular WDL    Peripheral Neurovascular WDL WDL       Cognitive/Neuro/Behavioral WDL    Cognitive/Neuro/Behavioral WDL WDL

## 2023-08-14 NOTE — ED PROVIDER NOTES
EMERGENCY DEPARTMENT ENCOUNTER      NAME: Toma Blanco  AGE: 32 year old female  YOB: 1990  MRN: [id number]  EVALUATION DATE & TIME: 2023 11:14 AM    PCP: Chantal Bazan    ED PROVIDER: Angélica Martinez PA-C      Chief Complaint   Patient presents with    Abdominal Pain    Shoulder Pain       FINAL IMPRESSION:  1. Right lower quadrant abdominal pain    2. Acute pain of right shoulder        MEDICAL DECISION MAKING:    Pertinent Labs & Imaging studies reviewed. (See chart for details)  32 year old  female with a h/o acute DVT abdominal cramping right shoulder pain presents to the Emergency Department for evaluation of abdominal cramping and right shoulder pain. On exam she is alert, non toxic appearing and in no acute distress. Vitals are WNL. No abdominal discomfort elicited on my exam.  No rebound, guarding.  No CVA tenderness.    Differential diagnosis includes abdominal cramping in early pregnancy, ligamentous pain, constipation, hetero/ectopic pregnancy, appendicitis, PUD, GERD, cholecystitis.  First trimester transvaginal ultrasound reveals 2 cystic structures without identifiable fetal poles at this time, pt estimates she is 5w3d gestation so possible that this represents early twin pregnancy. Labs reassuring, hCG 27,000 correlating to approximately 6 weeks gestation.  BMP WNL.  UA without signs of infection.  Unclear etiology for her discomfort today, suspect pregnancy related/ligamentous.  Discussed close return precautions should intra-abdominal process declare itself.  She is already established with OB and does have an ultrasound scheduled for 1 weeks time.    There is no evidence of acute or emergent process requiring intervention at this time. Pt is appropriate for outpatient management. Provisional nature of today's diagnosis was discussed and strict return precautions were given. Pt expressed understanding and She was discharged to home in good condition.      Medical Decision Making    History:  Supplemental history from: Documented in chart, if applicable and Family Member/Significant Other  External Record(s) reviewed: Documented in chart, if applicable.    Work Up:  Chart documentation includes differential considered and any EKGs or imaging independently interpreted by provider, where specified.  In additional to work up documented, I considered the following work up: Documented in chart, if applicable.    External consultation:  Discussion of management with another provider: Documented in chart, if applicable    Complicating factors:  Care impacted by chronic illness: N/A  Care affected by social determinants of health: N/A    Disposition considerations: Discharge. No recommendations on prescription strength medication(s). See documentation for any additional details.        CRITICAL CARE: None    ED COURSE  11:30 AM Attempted to meet pt who is at   12:00 PM  Met and evaluated patient. Discussed ED plan.   12:45 PM discharged to home in good condition by RN.     MEDICATIONS GIVEN IN THE EMERGENCY:  Medications - No data to display    NEW PRESCRIPTIONS STARTED AT TODAY'S ER VISIT  Discharge Medication List as of 2023  1:21 PM           =================================================================    HPI    Patient information was obtained from: Patient, accompanied by  James  Use of Intrepreter: N/A       Toma Blanco is a 32 year old female who presents for evaluation of right lower quadrant abdominal pain x3 days and right shoulder pain x1 day she is approximately 6 weeks pregnant and was concern for possible ectopic with this pain/cramping and then referring to the shoulder.  She is , miscarriage this spring and 1 prior pregnancy with uncomplicated delivery.  She did call her OB office and was told to come in this morning, but also was cautioned to report to the emergency department if anything got worse.  When the shoulder  started hurting she became more concerned prompting her evaluation here today.  She has not had any injury to the shoulder.  She states that it hurts when she moves around she is wondering now if this is due to more musculoskeletal.  She has pooped since the abdominal pain started, this was a normal soft bowel movement without any blood.  She does not think that this changed her pain.  She actually does not have any lower abdominal pain at this time.  She has not had any fever.      REVIEW OF SYSTEMS   As noted in HPI. All other systems negative.    PAST MEDICAL HISTORY:  Past Medical History:   Diagnosis Date    Acute deep vein thrombosis (DVT) of proximal vein of left lower extremity (H)     post partum    Anxiety     Hidradenitis suppurativa     Infection due to 2019 novel coronavirus     Nephrolithiasis     Plantar fascial fibromatosis of right foot     Varicella        PAST SURGICAL HISTORY:  Past Surgical History:   Procedure Laterality Date    DILATION AND CURETTAGE SUCTION N/A 2023    Procedure: DILATION AND CURETTAGE, UTERUS, USING SUCTION;  Surgeon: Lulu Leone MD;  Location: [facility] OR    EXCISE MASS FOOT Right 2023    Procedure: Excision and Neuroplasty of Right Plantar Foot Mass;  Surgeon: Hunter Redding MD;  Location: [facility] OR    Caruthers teeth extraction         CURRENT MEDICATIONS:    enoxaparin ANTICOAGULANT (LOVENOX) 40 MG/0.4ML syringe  Prenatal Vit-DSS-Fe Cbn-FA (PRENATAL AD PO)        ALLERGIES:  No Known Allergies    FAMILY HISTORY:  Family History   Problem Relation Age of Onset    Diabetes Father     Kidney Disease Father     Post Operative Nausea and Vomiting Sister     Diabetes Brother     Mental Illness Brother     Deep Vein Thrombosis (DVT) Maternal Grandmother     Dementia Maternal Grandfather     Heart Disease Maternal Grandfather     Dementia Paternal Grandmother     Diabetes Paternal Grandfather     Heart Failure Paternal Grandfather      Birth Son     Pulmonary  "Embolism Paternal Uncle        SOCIAL HISTORY:   Social History     Socioeconomic History    Marital status:      Spouse name: Not on file    Number of children: Not on file    Years of education: Not on file    Highest education level: Not on file   Occupational History    Not on file   Tobacco Use    Smoking status: Never    Smokeless tobacco: Never   Vaping Use    Vaping Use: Never used   Substance and Sexual Activity    Alcohol use: Not Currently     Alcohol/week: 1.0 - 2.0 standard drink of alcohol     Types: 1 - 2 Standard drinks or equivalent per week     Comment: once weekly    Drug use: Never    Sexual activity: Yes     Partners: Male   Other Topics Concern    Not on file   Social History Narrative    Not on file     Social Determinants of Health     Financial Resource Strain: Not on file   Food Insecurity: Not on file   Transportation Needs: Not on file   Physical Activity: Not on file   Stress: Not on file   Social Connections: Not on file   Intimate Partner Violence: Not on file   Housing Stability: Not on file         VITALS:  Patient Vitals for the past 24 hrs:   BP Temp Temp src Pulse Resp SpO2 Height Weight   08/14/23 1145 122/59 98.6  F (37  C) Oral 69 -- 96 % -- --   08/14/23 0957 115/66 99  F (37.2  C) Oral 71 16 99 % 1.778 m (5' 10\") 108.9 kg (240 lb)       PHYSICAL EXAM    Physical Exam  Vitals reviewed.   Constitutional:       General: She is not in acute distress.     Appearance: Normal appearance. She is not ill-appearing, toxic-appearing or diaphoretic.   HENT:      Head: Normocephalic and atraumatic.      Nose: No congestion.      Mouth/Throat:      Mouth: Mucous membranes are moist.      Pharynx: Oropharynx is clear.   Eyes:      General: No scleral icterus.        Right eye: No discharge.         Left eye: No discharge.   Cardiovascular:      Rate and Rhythm: Normal rate and regular rhythm.      Heart sounds: No murmur heard.  Pulmonary:      Effort: Pulmonary effort is normal. No " respiratory distress.      Breath sounds: Normal breath sounds. No stridor. No wheezing or rales.   Abdominal:      General: Abdomen is flat. There is no distension.      Palpations: Abdomen is soft.      Tenderness: There is no abdominal tenderness. There is no right CVA tenderness, left CVA tenderness, guarding or rebound.   Musculoskeletal:         General: No swelling or deformity. Normal range of motion.      Cervical back: Normal range of motion and neck supple.      Right lower leg: No edema.      Left lower leg: No edema.   Skin:     General: Skin is warm.      Capillary Refill: Capillary refill takes less than 2 seconds.      Coloration: Skin is not jaundiced.      Findings: No bruising, erythema, lesion or rash.   Neurological:      General: No focal deficit present.      Mental Status: She is alert and oriented to person, place, and time.      Cranial Nerves: No cranial nerve deficit.   Psychiatric:         Mood and Affect: Mood normal.         Behavior: Behavior normal.          LAB:  All pertinent labs reviewed and interpreted.    Labs Ordered and Resulted from Time of ED Arrival to Time of ED Departure   BASIC METABOLIC PANEL - Abnormal       Result Value    Sodium 137      Potassium 3.8      Chloride 108 (*)     Carbon Dioxide (CO2) 21 (*)     Anion Gap 8      Urea Nitrogen 9      Creatinine 0.65      Calcium 9.2      Glucose 90      GFR Estimate >90     HCG QUANTITATIVE PREGNANCY - Abnormal    hCG Quantitative 27,254 (*)    CBC WITH PLATELETS - Normal    WBC Count 9.9      RBC Count 4.70      Hemoglobin 12.7      Hematocrit 38.7      MCV 82      MCH 27.0      MCHC 32.8      RDW 13.5      Platelet Count 319     HEPATIC FUNCTION PANEL - Normal    Bilirubin Total 0.2      Bilirubin Direct 0.2      Protein Total 6.9      Albumin 3.7      Alkaline Phosphatase 50      AST 13      ALT 12     LIPASE - Normal    Lipase 29     ROUTINE UA WITH MICROSCOPIC REFLEX TO CULTURE - Normal    Color Urine Colorless       Appearance Urine Clear      Glucose Urine Negative      Bilirubin Urine Negative      Ketones Urine Negative      Specific Gravity Urine 1.009      Blood Urine Negative      pH Urine 6.5      Protein Albumin Urine Negative      Urobilinogen Urine <2.0      Nitrite Urine Negative      Leukocyte Esterase Urine Negative      RBC Urine <1      WBC Urine 1      Squamous Epithelials Urine 1           RADIOLOGY:  Reviewed all pertinent imaging. Please see official radiology report    US OB 1St Trimester Multiple w Transvag   Final Result   Addendum (preliminary) 1 of 1   Addendum:      Possible echogenic lesion in the left hepatic lobe measuring up to 1 cm.    Repeat ultrasound in 6 weeks is recommended. Differential includes a    cavernous hemangioma.      Findings were relayed to Chantal Bazan MD at 1:30 PM on    08/14/2023.         Final   IMPRESSION:    Cystic structures within the endometrium compatible with a twin pregnancy. However, no fetal pole was seen on this exam. Repeat ultrasound in one week with serial beta hCG is recommended to document a viable pregnancy.      No findings in the abdomen.       Abdomen US, limited (RUQ only)   Final Result   Addendum (preliminary) 1 of 1   Addendum:      Possible echogenic lesion in the left hepatic lobe measuring up to 1 cm.    Repeat ultrasound in 6 weeks is recommended. Differential includes a    cavernous hemangioma.      Findings were relayed to Chantal Bazan MD at 1:30 PM on    08/14/2023.         Final   IMPRESSION:    Cystic structures within the endometrium compatible with a twin pregnancy. However, no fetal pole was seen on this exam. Repeat ultrasound in one week with serial beta hCG is recommended to document a viable pregnancy.      No findings in the abdomen.             Angélica Martinez PA-C  Emergency Medicine  [facility] EMERGENCY ROOM  [address]  [id number]  [phone]  Dept: [phone]    This note has in part been created with speech recognition technology and may create an occasional, unintended word/grammar substitution. Errors are generally corrected in real time. Please message me via [employer] In Basket if you note any errors requiring clarification.       Angélica Martinez PA-C  08/14/23 [id number]

## 2023-08-17 ENCOUNTER — VIRTUAL VISIT (OUTPATIENT)
Dept: OBGYN | Facility: CLINIC | Age: 33
End: 2023-08-17
Payer: COMMERCIAL

## 2023-08-17 DIAGNOSIS — Z34.90 EARLY STAGE OF PREGNANCY: Primary | ICD-10-CM

## 2023-08-17 DIAGNOSIS — K76.9 LIVER LESION: ICD-10-CM

## 2023-08-17 PROCEDURE — 99442 PR PHYSICIAN TELEPHONE EVALUATION 11-20 MIN: CPT | Performed by: OBSTETRICS & GYNECOLOGY

## 2023-08-17 RX ORDER — PROGESTERONE 100 MG/1
200 CAPSULE ORAL DAILY
Qty: 60 CAPSULE | Refills: 3 | Status: ON HOLD | OUTPATIENT
Start: 2023-08-17 | End: 2023-08-30

## 2023-08-17 NOTE — PROGRESS NOTES
Toma is a 32 year old who is being evaluated via a billable telephone visit.      What phone number would you like to be contacted at? [phone]   How would you like to obtain your AVS? [place]    Distant Location (provider location):  On-site  Patient off site-- home     Start time: 3:14  End time: 3:30       I spent a total of 30 minutes reviewing records, reports, documentation and discussing with the patient on the date of encounter.          Assessment & Plan     Early stage of pregnancy  2 small gestational sac and yolk sacs but too early to identify fetal poles  Scant brown spotting today,   Used progesterone last pregnancy, wondering if she needs it this time    discussed she can try the vaginal supp if not covered then take the prometrium.     - progesterone (PROMETRIUM) 100 MG capsule; Take 2 capsules (200 mg) by mouth daily  - progesterone (ENDOMETRIN) 100 MG vaginal insert; Place 1 suppository (100 mg) vaginally 2 times daily       (K76.9) Liver lesion  Comment: new finding on US done for abd pain   Plan: follow-up with GI to discuss further.        Review of prior external note(s) from - Outside records from ED visit  ultrasound  Review of the result(s) of each unique test - US and hcg level  Ordering of each unique test  Prescription drug management  No LOS data to display   Time spent by me doing chart review, history and exam, documentation and further activities per the note       - hcg level next week as planned.   - follow-up ob US next week  - begin progesterone   - if US shows normal twin pregnancy, then we need to set her up for NOB appt    Karen Tapia MD  [facility]    Subjective   Toma is a 32 year old, presenting for the following health issues:  Follow Up (U/S follow up)  Patient's last menstrual period was 07/07/2023.   5w6d today   Was seen in ED at OSH for abd pain and US showed 2 fluid collections in uterus both with yolk sac    She is feeling pregnant  with nausea and fatigue    This is desired.   Had a recent SAB.   Hcg was 27,000+ on 8/14 at time of US.        Pelvis Ultrasound  UTERUS: Two cystic structures are seen within the endometrium compatible with a twin pregnancy. No fetal poles were seen in either presumed in the gestational sacs. Yolk sacs were identified which appear grossly unremarkable.  PLACENTA: Not yet formed. No evidence for sub-chorionic hemorrhage.    HPI    Another concern -- after she left the ED there was an addendum to the US which showed a 1 cm liver lesion.   discussed we can follow-up with GI about that, but at this point monitoring the pregnancy for viability is most important. The liver finding is not urgent and can be dealt within the next few months.     Review of Systems         Objective           Vitals:  No vitals were obtained today due to virtual visit.    Physical Exam   healthy, alert, and no distress  PSYCH: Alert and oriented times 3; coherent speech, normal   rate and volume, able to articulate logical thoughts, able   to abstract reason, no tangential thoughts, no hallucinations   or delusions  Her affect is normal  RESP: No cough, no audible wheezing, able to talk in full sentences  Remainder of exam unable to be completed due to telephone visits    Admission on 08/14/2023, Discharged on 08/14/2023   Component Date Value Ref Range Status    WBC Count 08/14/2023 9.9  4.0 - 11.0 10e3/uL Final    RBC Count 08/14/2023 4.70  3.80 - 5.20 10e6/uL Final    Hemoglobin 08/14/2023 12.7  11.7 - 15.7 g/dL Final    Hematocrit 08/14/2023 38.7  35.0 - 47.0 % Final    MCV 08/14/2023 82  78 - 100 fL Final    MCH 08/14/2023 27.0  26.5 - 33.0 pg Final    MCHC 08/14/2023 32.8  31.5 - 36.5 g/dL Final    RDW 08/14/2023 13.5  10.0 - 15.0 % Final    Platelet Count 08/14/2023 319  150 - 450 10e3/uL Final    Sodium 08/14/2023 137  136 - 145 mmol/L Final    Potassium 08/14/2023 3.8  3.5 - 5.0 mmol/L Final    Chloride 08/14/2023 108 (H)  98 - 107  mmol/L Final    Carbon Dioxide (CO2) 08/14/2023 21 (L)  22 - 31 mmol/L Final    Anion Gap 08/14/2023 8  5 - 18 mmol/L Final    Urea Nitrogen 08/14/2023 9  8 - 22 mg/dL Final    Creatinine 08/14/2023 0.65  0.60 - 1.10 mg/dL Final    Calcium 08/14/2023 9.2  8.5 - 10.5 mg/dL Final    Glucose 08/14/2023 90  70 - 125 mg/dL Final    GFR Estimate 08/14/2023 >90  >60 mL/min/1.73m2 Final    Bilirubin Total 08/14/2023 0.2  0.0 - 1.0 mg/dL Final    Bilirubin Direct 08/14/2023 0.2  <=0.5 mg/dL Final    Protein Total 08/14/2023 6.9  6.0 - 8.0 g/dL Final    Albumin 08/14/2023 3.7  3.5 - 5.0 g/dL Final    Alkaline Phosphatase 08/14/2023 50  45 - 120 U/L Final    AST 08/14/2023 13  0 - 40 U/L Final    ALT 08/14/2023 12  0 - 45 U/L Final    Lipase 08/14/2023 29  0 - 52 U/L Final    hCG Quantitative 08/14/2023 27,254 (H)  0 - 4 mlU/mL Final    Non-pregnant female . . . . . . . . . . . . . 0-4 (<5) mIU/mL  Equivocal result for early pregnancy . . . . 5-24 mIU/mL  Values in pregnancy should double every 2-3 days for the first 6 weeks. Patients with very low levels of hCG should be resampled and retested after 48 hours.   For diagnostic purposes, hCG results should be used   conjunction with other data.   If the hCG level is inconsistent with clinical evidence,   results should be confirmed by an alternate hCG method.   This assay is approved for use in the early detection   of pregnancy only. It is not approved for any other   uses such as tumor marker screening or monitoring.    Color Urine 08/14/2023 Colorless  Colorless, Straw, Light Yellow, Yellow Final    Appearance Urine 08/14/2023 Clear  Clear Final    Glucose Urine 08/14/2023 Negative  Negative mg/dL Final    Bilirubin Urine 08/14/2023 Negative  Negative Final    Ketones Urine 08/14/2023 Negative  Negative mg/dL Final    Specific Gravity Urine 08/14/2023 1.009  1.001 - 1.030 Final    Blood Urine 08/14/2023 Negative  Negative Final    pH Urine 08/14/2023 6.5  5.0 - 7.0 Final     Protein Albumin Urine 08/14/2023 Negative  Negative mg/dL Final    Urobilinogen Urine 08/14/2023 <2.0  <2.0 mg/dL Final    Nitrite Urine 08/14/2023 Negative  Negative Final    Leukocyte Esterase Urine 08/14/2023 Negative  Negative Final    RBC Urine 08/14/2023 <1  <=2 /HPF Final    WBC Urine 08/14/2023 1  <=5 /HPF Final    Squamous Epithelials Urine 08/14/2023 1  <=1 /HPF Final               Phone call duration: 16 minutes

## 2023-08-18 ENCOUNTER — LAB (OUTPATIENT)
Dept: LAB | Facility: CLINIC | Age: 33
End: 2023-08-18
Payer: COMMERCIAL

## 2023-08-18 ENCOUNTER — TELEPHONE (OUTPATIENT)
Dept: OBGYN | Facility: CLINIC | Age: 33
End: 2023-08-18

## 2023-08-18 DIAGNOSIS — Z34.90 EARLY STAGE OF PREGNANCY: Primary | ICD-10-CM

## 2023-08-18 DIAGNOSIS — Z34.90 EARLY STAGE OF PREGNANCY: ICD-10-CM

## 2023-08-18 LAB — HCG INTACT+B SERPL-ACNC: ABNORMAL MIU/ML

## 2023-08-18 PROCEDURE — 84702 CHORIONIC GONADOTROPIN TEST: CPT

## 2023-08-18 PROCEDURE — 36415 COLL VENOUS BLD VENIPUNCTURE: CPT

## 2023-08-18 NOTE — TELEPHONE ENCOUNTER
LMP 7/7 6w0d    Started spotting red today. Was brown spotting yesterday when she talked to Dr Tapia - has already moved up US to next week Thursday.   Ordered HCG labs to be drawn today and Monday. Bleeding precautions given to the pt.    AMRIK Matthews

## 2023-08-20 ENCOUNTER — NURSE TRIAGE (OUTPATIENT)
Dept: NURSING | Facility: CLINIC | Age: 33
End: 2023-08-20
Payer: COMMERCIAL

## 2023-08-20 LAB
ABO/RH(D): NORMAL
ANTIBODY SCREEN: NEGATIVE
SPECIMEN EXPIRATION DATE: NORMAL

## 2023-08-20 NOTE — TELEPHONE ENCOUNTER
Patient calling because she thinks she's miscarrying. Pregnant with twins.     OB Triage Call      Is patient's OB/Midwife with the [facility]? [facility]- Proceed with triage     Reason for call: potential miscarriage    Assessment: She had some spotting this week, and just now she had a large gush of blood with clots 30 minutes ago, and is now cramping and still bleeding.     Plan: Patient to call provider in the morning when the clinic opens.     Patient plans to deliver at  unknown    Patient's primary OB Provider is VIRI Tapia.      Per protocol recommendations Patient to call providers office during clinic hours       Is patient's delivering hospital on divert? Does not apply due to Patient to call provider's office in the morning      6w2d    Estimated Date of Delivery: 2024        OB History    Para Term  AB Living   3 1 0 1 1 1   SAB IAB Ectopic Multiple Live Births   1 0 0 0 1      # Outcome Date GA Lbr Mikie/2nd Weight Sex Delivery Anes PTL Lv   3 Current            2 SAB 23 8w3d    AB, INEVITAB      1  21 33w0d  1.531 kg (3 lb 6 oz) M   Y YVONNE      Complications: Polyhydramnios      Obstetric Comments   First baby: mult anomalies, poly, PTL; no genetic positive test       No results found for: GBS       Care advice is for patient to call her OB/GYN in the morning when clinic opens. Patient has an HCG test scheduled for tomorrow morning, which she will complete.     Routed to Dr. DAYA Tapia as YOAV.     Maribell Robison, RN  [facility]  2023, 12:18 PM    Reason for Disposition   MODERATE vaginal bleeding (e.g., soaking 1 pad per hour; clots)    Additional Information   Negative: Shock suspected (e.g., cold/pale/clammy skin, too weak to stand, low BP, rapid pulse)   Negative: Difficult to awaken or acting confused (e.g., disoriented, slurred speech)   Negative: Passed out (i.e., lost consciousness, collapsed and was not  "responding)   Negative: Sounds like a life-threatening emergency to the triager   Negative: [1] Vaginal bleeding AND [2] pregnant 20 or more weeks   Negative: Not pregnant or pregnancy status unknown   Negative: SEVERE abdominal pain   Negative: [1] SEVERE vaginal bleeding (e.g., soaking 2 pads / hour, large blood clots) AND [2] present 2 or more hours   Negative: SEVERE dizziness (e.g., unable to stand, requires support to walk, feels like passing out)   Negative: [1] MODERATE vaginal bleeding (e.g., soaking 1 pad per hour; clots) AND [2] present > 6 hours   Negative: [1] MODERATE vaginal bleeding (e.g., soaking 1 pad per hour; clots) AND [2] pregnant > 12 weeks   Negative: Passed tissue (e.g., gray-white)   Negative: Shoulder pain   Negative: Pale skin (pallor) of new-onset or worsening   Negative: Patient sounds very sick or weak to the triager   Negative: [1] Constant abdominal pain AND [2] present > 2 hours   Negative: Fever > 100.4 F (38.0 C)   Negative: [1] Intermittent lower abdominal pain (e.g., cramping) AND [2] present > 24 hours   Negative: Prior history of \"ectopic pregnancy\" or previous tubal surgery (e.g., tubal ligation)   Negative: Pain or burning with passing urine (urination)    Protocols used: Pregnancy - Vaginal Bleeding Less Than 20 Weeks [facility]-    "

## 2023-08-21 ENCOUNTER — LAB (OUTPATIENT)
Dept: LAB | Facility: CLINIC | Age: 33
End: 2023-08-21
Payer: COMMERCIAL

## 2023-08-21 ENCOUNTER — ANCILLARY PROCEDURE (OUTPATIENT)
Dept: ULTRASOUND IMAGING | Facility: CLINIC | Age: 33
End: 2023-08-21
Attending: OBSTETRICS & GYNECOLOGY
Payer: COMMERCIAL

## 2023-08-21 ENCOUNTER — VIRTUAL VISIT (OUTPATIENT)
Dept: OBGYN | Facility: CLINIC | Age: 33
End: 2023-08-21
Attending: OBSTETRICS & GYNECOLOGY
Payer: COMMERCIAL

## 2023-08-21 DIAGNOSIS — O09.91 ENCOUNTER FOR SUPERVISION OF HIGH RISK PREGNANCY IN FIRST TRIMESTER, ANTEPARTUM: ICD-10-CM

## 2023-08-21 DIAGNOSIS — O20.9 BLEEDING IN EARLY PREGNANCY: Primary | ICD-10-CM

## 2023-08-21 DIAGNOSIS — Z86.718 PERSONAL HISTORY OF DVT (DEEP VEIN THROMBOSIS): ICD-10-CM

## 2023-08-21 DIAGNOSIS — O41.8X10 SUBCHORIONIC HEMATOMA IN FIRST TRIMESTER, SINGLE OR UNSPECIFIED FETUS: ICD-10-CM

## 2023-08-21 DIAGNOSIS — O46.8X1 SUBCHORIONIC HEMATOMA IN FIRST TRIMESTER, SINGLE OR UNSPECIFIED FETUS: ICD-10-CM

## 2023-08-21 DIAGNOSIS — O30.041 DICHORIONIC DIAMNIOTIC TWIN PREGNANCY IN FIRST TRIMESTER: ICD-10-CM

## 2023-08-21 LAB
ALBUMIN UR-MCNC: NEGATIVE MG/DL
APPEARANCE UR: CLEAR
BILIRUB UR QL STRIP: NEGATIVE
COLOR UR AUTO: YELLOW
ERYTHROCYTE [DISTWIDTH] IN BLOOD BY AUTOMATED COUNT: 13.5 % (ref 10–15)
GLUCOSE UR STRIP-MCNC: NEGATIVE MG/DL
HBV SURFACE AG SERPL QL IA: NONREACTIVE
HCG INTACT+B SERPL-ACNC: ABNORMAL MIU/ML
HCT VFR BLD AUTO: 39.8 % (ref 35–47)
HCV AB SERPL QL IA: NONREACTIVE
HGB BLD-MCNC: 13.2 G/DL (ref 11.7–15.7)
HGB UR QL STRIP: ABNORMAL
HIV 1+2 AB+HIV1 P24 AG SERPL QL IA: NONREACTIVE
KETONES UR STRIP-MCNC: NEGATIVE MG/DL
LEUKOCYTE ESTERASE UR QL STRIP: NEGATIVE
MCH RBC QN AUTO: 28 PG (ref 26.5–33)
MCHC RBC AUTO-ENTMCNC: 33.2 G/DL (ref 31.5–36.5)
MCV RBC AUTO: 84 FL (ref 78–100)
NITRATE UR QL: NEGATIVE
PH UR STRIP: 6.5 [PH] (ref 5–7)
PLATELET # BLD AUTO: 296 10E3/UL (ref 150–450)
RBC # BLD AUTO: 4.72 10E6/UL (ref 3.8–5.2)
RUBV IGG SERPL QL IA: 3.38 INDEX
RUBV IGG SERPL QL IA: POSITIVE
SP GR UR STRIP: 1.01 (ref 1–1.03)
T PALLIDUM AB SER QL: NONREACTIVE
UROBILINOGEN UR STRIP-ACNC: 0.2 E.U./DL
WBC # BLD AUTO: 8.8 10E3/UL (ref 4–11)

## 2023-08-21 PROCEDURE — 85027 COMPLETE CBC AUTOMATED: CPT

## 2023-08-21 PROCEDURE — 86803 HEPATITIS C AB TEST: CPT

## 2023-08-21 PROCEDURE — 76817 TRANSVAGINAL US OBSTETRIC: CPT | Performed by: OBSTETRICS & GYNECOLOGY

## 2023-08-21 PROCEDURE — 99442 PR PHYSICIAN TELEPHONE EVALUATION 11-20 MIN: CPT | Performed by: OBSTETRICS & GYNECOLOGY

## 2023-08-21 PROCEDURE — 87389 HIV-1 AG W/HIV-1&-2 AB AG IA: CPT

## 2023-08-21 PROCEDURE — 87340 HEPATITIS B SURFACE AG IA: CPT

## 2023-08-21 PROCEDURE — 84702 CHORIONIC GONADOTROPIN TEST: CPT

## 2023-08-21 PROCEDURE — 36415 COLL VENOUS BLD VENIPUNCTURE: CPT

## 2023-08-21 PROCEDURE — 87086 URINE CULTURE/COLONY COUNT: CPT

## 2023-08-21 PROCEDURE — 86901 BLOOD TYPING SEROLOGIC RH(D): CPT

## 2023-08-21 PROCEDURE — 81003 URINALYSIS AUTO W/O SCOPE: CPT

## 2023-08-21 PROCEDURE — 86762 RUBELLA ANTIBODY: CPT

## 2023-08-21 PROCEDURE — 86850 RBC ANTIBODY SCREEN: CPT

## 2023-08-21 PROCEDURE — 86780 TREPONEMA PALLIDUM: CPT

## 2023-08-21 PROCEDURE — 86900 BLOOD TYPING SEROLOGIC ABO: CPT

## 2023-08-21 NOTE — PROGRESS NOTES
Toma is a 32 year old who is being evaluated via a billable telephone visit.      What phone number would you like to be contacted at? [phone]  How would you like to obtain your AVS? Deana    Patient's location:  home  Distant Location (provider location):  On-site    1. Bleeding in early pregnancy  2. Dichorionic diamniotic twin pregnancy in first trimester  3. Subchorionic hematoma in first trimester, single or unspecified fetus  Reviewed ultrasound findings, most consistent with viable peralta pregnancy and blighted ovum, although recommend repeat imaging to confirm no viable pregnancy in second gestational sac.  Discussed DEBO at length and normal expectations with that finding.  Reviewed bleeding and other concerns which would warrant call to clinic/answering service  - US OB <14 Weeks w Transvaginal Single; Future      4. Personal history of DVT (deep vein thrombosis)  Given that bleeding has resolved, recommend resuming prophylactic Lovenox at this time.      Subjective   Toma is a 32 year old, presenting for the following health issues:  No chief complaint on file.      HPI   Patient is 6w3d by LMP who presents for phone visit today to discuss US results.  US obtained for bleeding in early pregnancy.  Had some bleeding last week, spotting.  Yesterday morning, had a big gush with a couple clots.  Continued throughout the day, but overall tapered down until minimal.  Some mild cramping as well.  Very light bleeding/ old blood.    Feels like how miscarriage in May started, similar gestational age.    Held Lovenox last night.  On ppx dose due to hx of PP DVT.        Review of Systems   Constitutional, HEENT, cardiovascular, pulmonary, gi and gu systems are negative, except as otherwise noted.      Objective           Vitals:  No vitals were obtained today due to virtual visit.    Physical Exam   healthy, alert, and no distress  PSYCH: Alert and oriented times 3; coherent speech, normal   rate and volume,  able to articulate logical thoughts, able   to abstract reason, no tangential thoughts, no hallucinations   or delusions  Her affect is normal  RESP: No cough, no audible wheezing, able to talk in full sentences  Remainder of exam unable to be completed due to telephone visits    Reviewed US report and images from today, as well as serial hcg results on day of encounter.        Phone call duration: 16 minutes

## 2023-08-22 ENCOUNTER — NURSE TRIAGE (OUTPATIENT)
Dept: NURSING | Facility: CLINIC | Age: 33
End: 2023-08-22
Payer: COMMERCIAL

## 2023-08-22 LAB — BACTERIA UR CULT: NORMAL

## 2023-08-22 NOTE — TELEPHONE ENCOUNTER
"Pt calling re: possible miscarriage.    Pt estimated to be 6 weeks with twin pregnancy. Had US yesterday with only one detectable heartbeat. Toma says she woke up feeling \"wet\" this morning. When she went to the bathroom, says a \"glob\" fell out. States that this glob is small and fits in the palm of her hand. It looks reddish to her and she says it feels firm. To note, pt says she is on Lovenox.    Pt denies any abdominal pain/cramping or heavy vaginal bleeding. She is still currently sitting on the toilet, but says she can feel the bleeding tapering off. Denies feeling dizzy or lightheaded and has not passed out.     Per protocol, pt should see PCP within 24 hours. Will route message to her care team to follow up with her this morning. Advised pt to put on a maxi pad to quantify bleeding and instructed her that soaking through 1 pad her hour is too much bleeding. She would need to call back if this happens or if she begins to have any severe abdominal pain or any other concerns. Pt also advised to save the tissue she passed in a plastic bag. Pt verbalized understanding and will await call back from the clinic.    Aurora Zhou RN, BSN  [facility]   Triage Nurse Advisor    Reason for Disposition   Passed tissue (e.g., gray-white)    Additional Information   Negative: Shock suspected (e.g., cold/pale/clammy skin, too weak to stand, low BP, rapid pulse)   Negative: Difficult to awaken or acting confused (e.g., disoriented, slurred speech)   Negative: Passed out (i.e., lost consciousness, collapsed and was not responding)   Negative: Sounds like a life-threatening emergency to the triager   Negative: [1] Threatened miscarriage (threatened ) suspected AND [2] has not been examined by a doctor (or NP/PA)   Negative: [1] Abdomen pain is main symptom and [2] NO vaginal bleeding in past 24 hours   Negative: [1] Vaginal bleeding is main symptom and [2] more than 4 weeks since medical visit   Negative: Not " pregnant or pregnancy status unknown   Negative: SEVERE abdominal pain   Negative: [1] SEVERE vaginal bleeding (e.g., soaking 2 pads per hour, large blood clots) AND [2] present 2 or more hours   Negative: MODERATE vaginal bleeding (e.g., soaking 1 pad or tampon per hour and present > 6 hours; 1 menstrual cup every 6 hours)   Negative: Pale skin (pallor) of new-onset or worsening   Negative: Patient sounds very sick or weak to the triager   Negative: [1] Constant abdominal pain AND [2] present > 2 hours   Negative: [1] Abdomen pain AND 2] fever > 100.4 F (38.0 C)   Negative: [1] Caller has URGENT question AND [2] triager unable to answer question    Protocols used:  - Threatened Miscarriage Follow-up Call-A-

## 2023-08-22 NOTE — TELEPHONE ENCOUNTER
Can you please call her and get her set up for a viability check US?   Need to see if viable twin is ok.

## 2023-08-23 ENCOUNTER — TELEPHONE (OUTPATIENT)
Dept: OBGYN | Facility: CLINIC | Age: 33
End: 2023-08-23

## 2023-08-23 ENCOUNTER — MYC MEDICAL ADVICE (OUTPATIENT)
Dept: HEMATOLOGY | Facility: CLINIC | Age: 33
End: 2023-08-23

## 2023-08-23 ENCOUNTER — VIRTUAL VISIT (OUTPATIENT)
Dept: OBGYN | Facility: CLINIC | Age: 33
End: 2023-08-23
Payer: COMMERCIAL

## 2023-08-23 DIAGNOSIS — O03.9 MISCARRIAGE: ICD-10-CM

## 2023-08-23 DIAGNOSIS — Z87.59 HISTORY OF FETAL LOSS: Primary | ICD-10-CM

## 2023-08-23 PROCEDURE — 99443 PR PHYSICIAN TELEPHONE EVALUATION 21-30 MIN: CPT | Mod: 24 | Performed by: OBSTETRICS & GYNECOLOGY

## 2023-08-23 NOTE — TELEPHONE ENCOUNTER
Patient calling to state that she miscarried yesterday and was wondering if she should keep US scheduled for this morning.     No Rns in at time of patient call, informed patient I would send message at high priority so that they can call her back when they get to clinic. Patient understood and confirmed phone number in her chart for call back.     Routing to RD Triage at high priority for guidance and patient call back.       Alysha Barry/her/hers  [facility] Scheduler

## 2023-08-23 NOTE — TELEPHONE ENCOUNTER
Called patient back - patient states she had miscarried the second twin.  Patient had US today to follow up on viable twin.    Patient states last night she passed the second gestational sac.  Patient had heavier bleeding with a lot of cramping while passing the tissue/clots.    Since passing the tissue her bleeding has decreased to a period like flow. Cramps have improved.    Gave ED bleeding precautions.  Cancelled US for today - patient did not want to have to come into clinic.  Keep phone visit with BE to discuss next steps.    Josi Choudhury RN

## 2023-08-23 NOTE — PROGRESS NOTES
Phone-Visit Details    Type of service:  Phone Visit    Originating Location (pt. Location):  at home (with spouse)    Distant Location (provider location): on site    Mode of Communication: Telephone visit    Telephone time:  30 min    Had heavy bleeding on Sunday and skipped blood thinner. Monday took Lovenox, Tuesday did not take it due to heavy bleeding again. Thinks she has passed everything and still has the tissue. Wondering if we want to test it? First baby with many anomalies but no genetics found to be the issue. (Special needs child)    Did take progesterone injections for first pregnancy and has had DEBO with SAB at 6 weeks for last 2 pregnancies. On lovenox for  h/o DVT and pos cardiolipin but wondering how that would affect DEBO? Would like a plan and has list of questions.    Assessment/Plan:  (Z87.59) History of fetal loss  (primary encounter diagnosis)  Comment: 6 wks x2  Plan: progesterone (PROMETRIUM) 200 MG capsule,         Thyroid peroxidase antibody, T4, free, T3,         total, Anti thyroglobulin antibody          Patient Instructions   Do pregnancy test in 3 weeks and make sure is negative.     Check in with hematology to see if they want to repeat C reactive protein labs, etc.  I am adding in thyroid labs to be done then as well.    Start prometrium 200 mg (either vaginal or oral) day after ovulation until 8-10 weeks gestation.     We will start weekly ultrasounds at 6 weeks to check for heartbeat AND DEBO (subchorionic hemorrhage) until at least 10 weeks.  Consider adding alpha lipoid acid 300 mg BID if we see DEBO. Hope that extra progesterone prevents DEBO in early pregnancy.    Will discuss when to start Lovenox. I favor 7-8 weeks if no DEBO seen.      (O03.9) Miscarriage  Comment: current seems to be passed  Plan: apixaban ANTICOAGULANT (ELIQUIS ANTICOAGULANT)         5 MG tablet        Would like to stop Lovenox and go back on oral agent for the pp hypercoagulable state so script was  done.        ABDOULAYE GERBER MD

## 2023-08-23 NOTE — TELEPHONE ENCOUNTER
"[id number]  Toma Blanco  32 year old female  [facility] Diagnosis: Hidradenitis Suppurativa, post-partum DVT, recent superficial thrombophlebitis after D&C procedure, weakly positive Cardiolipin antibody with plan to repeat level   [facility] Provider: Magdalena Martin PA-C    S-(situation): Incoming message from patient stating that she miscarried both twins at 6 weeks. Per patient, she had not started her blood thinners until week 4 of pregnancy. She is wondering if this could be related to her two consecutive miscarriages.     B-(background): Toma saw Magdalena Martin PA-C on 7/24/23. Plan per Magdalena, \"With future pregnancies, I recommend LMWH at prophylactic intensity. I recommend initiation at first positive pregnancy test. Would recommend Lovenox 40mg once daily.\"     Toma informed clinic of positive pregnancy test on 7/31/23. Script of Lovenox 40 mg sent to patient's preferred pharmacy. Toma messaged clinic on 8/7/23 and said that she still hasn't gotten her Lovenox prescription as a prior authorization is needed. Clinic pharmacy assisted with prior authorization and the PA was approved on 8/9/23.     A-(assessment)/R-(recommendations): RN will route to provider for review. Pending response.    Maru Ceron RN, BSN, PCCN  Nurse Clinician    [facility]  [address] [place] [address], [address], [place] [address]   Office, direct: [phone]  Main office number: [phone]  Pronouns: She, her, hers             "

## 2023-08-24 PROBLEM — O09.91 ENCOUNTER FOR SUPERVISION OF HIGH RISK PREGNANCY IN FIRST TRIMESTER, ANTEPARTUM: Status: RESOLVED | Noted: 2023-08-09 | Resolved: 2023-08-24

## 2023-08-24 RX ORDER — PROGESTERONE 200 MG/1
200 CAPSULE ORAL DAILY
Qty: 90 CAPSULE | Refills: 3 | Status: ON HOLD | OUTPATIENT
Start: 2023-08-24 | End: 2023-08-30

## 2023-08-25 ENCOUNTER — APPOINTMENT (OUTPATIENT)
Dept: ULTRASOUND IMAGING | Facility: CLINIC | Age: 33
End: 2023-08-25
Attending: PHYSICIAN ASSISTANT
Payer: COMMERCIAL

## 2023-08-25 ENCOUNTER — NURSE TRIAGE (OUTPATIENT)
Dept: NURSING | Facility: CLINIC | Age: 33
End: 2023-08-25
Payer: COMMERCIAL

## 2023-08-25 ENCOUNTER — HOSPITAL ENCOUNTER (EMERGENCY)
Facility: CLINIC | Age: 33
Discharge: HOME | End: 2023-08-25
Admitting: PHYSICIAN ASSISTANT
Payer: COMMERCIAL

## 2023-08-25 VITALS
DIASTOLIC BLOOD PRESSURE: 57 MMHG | HEIGHT: 70 IN | OXYGEN SATURATION: 98 % | TEMPERATURE: 98 F | HEART RATE: 74 BPM | RESPIRATION RATE: 16 BRPM | SYSTOLIC BLOOD PRESSURE: 110 MMHG | WEIGHT: 240 LBS | BODY MASS INDEX: 34.36 KG/M2

## 2023-08-25 DIAGNOSIS — O03.9 MISCARRIAGE: ICD-10-CM

## 2023-08-25 DIAGNOSIS — Z86.718 H/O DEEP VENOUS THROMBOSIS: ICD-10-CM

## 2023-08-25 DIAGNOSIS — N93.9 VAGINAL BLEEDING: ICD-10-CM

## 2023-08-25 DIAGNOSIS — Z79.01 ANTICOAGULATED: ICD-10-CM

## 2023-08-25 LAB
ABO/RH(D): NORMAL
ANION GAP SERPL CALCULATED.3IONS-SCNC: 11 MMOL/L (ref 5–18)
ANTIBODY SCREEN: NEGATIVE
BUN SERPL-MCNC: 8 MG/DL (ref 8–22)
CALCIUM SERPL-MCNC: 9.2 MG/DL (ref 8.5–10.5)
CHLORIDE BLD-SCNC: 106 MMOL/L (ref 98–107)
CO2 SERPL-SCNC: 21 MMOL/L (ref 22–31)
CREAT SERPL-MCNC: 0.65 MG/DL (ref 0.6–1.1)
ERYTHROCYTE [DISTWIDTH] IN BLOOD BY AUTOMATED COUNT: 13.6 % (ref 10–15)
GFR SERPL CREATININE-BSD FRML MDRD: >90 ML/MIN/1.73M2
GLUCOSE BLD-MCNC: 89 MG/DL (ref 70–125)
HCG SERPL-ACNC: ABNORMAL MLU/ML (ref 0–4)
HCT VFR BLD AUTO: 35.2 % (ref 35–47)
HGB BLD-MCNC: 11.8 G/DL (ref 11.7–15.7)
MCH RBC QN AUTO: 28 PG (ref 26.5–33)
MCHC RBC AUTO-ENTMCNC: 33.5 G/DL (ref 31.5–36.5)
MCV RBC AUTO: 83 FL (ref 78–100)
PLATELET # BLD AUTO: 282 10E3/UL (ref 150–450)
POTASSIUM BLD-SCNC: 4 MMOL/L (ref 3.5–5)
RBC # BLD AUTO: 4.22 10E6/UL (ref 3.8–5.2)
SODIUM SERPL-SCNC: 138 MMOL/L (ref 136–145)
SPECIMEN EXPIRATION DATE: NORMAL
WBC # BLD AUTO: 9.3 10E3/UL (ref 4–11)

## 2023-08-25 PROCEDURE — 84702 CHORIONIC GONADOTROPIN TEST: CPT | Performed by: PHYSICIAN ASSISTANT

## 2023-08-25 PROCEDURE — 80048 BASIC METABOLIC PNL TOTAL CA: CPT | Performed by: PHYSICIAN ASSISTANT

## 2023-08-25 PROCEDURE — 86850 RBC ANTIBODY SCREEN: CPT | Performed by: PHYSICIAN ASSISTANT

## 2023-08-25 PROCEDURE — 76801 OB US < 14 WKS SINGLE FETUS: CPT

## 2023-08-25 PROCEDURE — 85027 COMPLETE CBC AUTOMATED: CPT | Performed by: PHYSICIAN ASSISTANT

## 2023-08-25 PROCEDURE — 99284 EMERGENCY DEPT VISIT MOD MDM: CPT | Mod: 25

## 2023-08-25 PROCEDURE — 76802 OB US < 14 WKS ADDL FETUS: CPT

## 2023-08-25 PROCEDURE — 86901 BLOOD TYPING SEROLOGIC RH(D): CPT | Performed by: PHYSICIAN ASSISTANT

## 2023-08-25 PROCEDURE — 36415 COLL VENOUS BLD VENIPUNCTURE: CPT | Performed by: PHYSICIAN ASSISTANT

## 2023-08-25 ASSESSMENT — ACTIVITIES OF DAILY LIVING (ADL)
ADLS_ACUITY_SCORE: 35
ADLS_ACUITY_SCORE: 35

## 2023-08-25 ASSESSMENT — ENCOUNTER SYMPTOMS: FATIGUE: 1

## 2023-08-25 NOTE — PATIENT INSTRUCTIONS
Do pregnancy test in 3 weeks and make sure is negative.     Check in with hematology to see if they want to repeat C reactive protein labs, etc.  I am adding in thyroid labs to be done then as well.    Start prometrium 200 mg (either vaginal or oral) day after ovulation until 8-10 weeks gestation.     We will start weekly ultrasounds at 6 weeks to check for heartbeat AND DEBO (subchorionic hemorrhage) until at least 10 weeks.  Consider adding alpha lipoid acid 300 mg BID if we see DEBO. Hope that extra progesterone prevents DEBO in early pregnancy.    Will discuss when to start Lovenox. I favor 7-8 weeks if no DEBO seen.

## 2023-08-25 NOTE — ED TRIAGE NOTES
Pt was 6 weeks pregnant with twins when she had a miscarriage. Pt believes that she passed all the tissue on Monday. She has had bleeding all week and today she noticed new gold ball sized blood clots.

## 2023-08-25 NOTE — TELEPHONE ENCOUNTER
The patient is reporting she is having a spontaneous  with twins at six weeks of pregnancy.    She reports she has passed two big clots with heavy bleeding the size of half of her palm  She reports she is on a blood thinner and re-started the blood thinner one day ago 23  She is complaining of not feeling well and reports heavy vaginal bleeding as well  Triage guidelines recommend to go to ED now  Caller verbalized and understands directives    Reason for Disposition   SEVERE vaginal bleeding (e.g., soaking 2 pads per hour or large blood clots and present 2 or more hours)    Additional Information   Negative: Shock suspected (e.g., cold/pale/clammy skin, too weak to stand, low BP, rapid pulse)   Negative: Difficult to awaken or acting confused (e.g., disoriented, slurred speech)   Negative: Passed out (i.e., lost consciousness, collapsed and was not responding)   Negative: Sounds like a life-threatening emergency to the triager   Negative: SEVERE abdominal pain   Negative: [1] Threatened miscarriage (threatened ) suspected AND [2] has not been examined by a doctor (or NP/PA)   Negative: [1] Abdomen pain is main symptom and [2] NO vaginal bleeding in past 24 hours   Negative: [1] Vaginal bleeding is main symptom and [2] more than 4 weeks since medical visit   Negative: Not pregnant or pregnancy status unknown    Protocols used:  - Threatened Miscarriage Follow-up Call-A-

## 2023-08-26 NOTE — ED PROVIDER NOTES
EMERGENCY DEPARTMENT ENCOUNTER      NAME: Toma Blanco  AGE: 32 year old female  YOB: 1990  MRN: [id number]  EVALUATION DATE & TIME: No admission date for patient encounter.    PCP: Chantal Bazan    ED PROVIDER: Sindhu Tamayo PA-C      Chief Complaint   Patient presents with    Vaginal Bleeding - Pregnant         FINAL IMPRESSION:  1. Vaginal bleeding    2. Miscarriage    3. Anticoagulated    4. H/O deep venous thrombosis          ED COURSE & MEDICAL DECISION MAKIN:55 PM I introduced myself to patient, performed initial HPI and examination.   10:17 PM I updated the patient.   10:40 PM Spoke with Dr. Diaz, who recommends reaching out to our in-house team. Paged to Dr. Lam  10:56 PM Spoke with Dr. Lam. Paged for Heme-Onc  11:11 PM I spoke with Dr. Jama (oncology). who does not see any indication to be on Eliquis at this time. Will plan to have patient discontinue Eliquis.  11:15 PM I updated the patient.     Toma Blanco is a 32 year old female who presents to the emergency department today for evaluation of vaginal bleeding.  Patient began having vaginal bleeding , 5 days ago. Does report passing clots/products Tuesday. Today having worsening bleeding with clots, cramping, lightheadedness. Patient is on Eliquis with history of blood clot in pregnancy (previously was on Lovenox, switched this week).     Reviewed chart review, Ultrasound performed : dichorionic diamniotic twin gestation with live IUP measuring 6 w3d in one sac and a likely blighted ovum in the second. Recommend ultrasound in 7-10 days to confirm viability. DEBO 2.6 x 1.2 x 1.1 cm    Patient saw Dr. Leone 23 for supportive management in miscarriage. Restarted on Eliquis with history of DVT in pregnancy and recent superficial thrombophlebitis .    Labs with no leukocytosis.  Hemoglobin 11.8, 13.2 4 days ago. Rh POSITIVE and no indication for Rhogam. HCG quant 72,612, decreased  from 4 days ago (77,414).     Sound today shows twin diamniotic/dichorionic gestation, findings suspicious for but not diagnostic of pregnancy failure for twins a and B(however with downtrending hCG this would be most consistent).  Both twins measure CRL of 0.6 cm corresponding to 6-week gestational age without any cardiac activity with either.  Large subchorionic hemorrhage measuring 6.1 x 4.6 x 4.5 cm.    I consulted with OB and hematology/oncology.  No indication for emergent D&C at this time as patient is not hemorrhaging, otherwise stable.  Ultimately patient was on anticoagulation more for preventative with history of DVT in pregnancy and is only 6 weeks along, less likely for subsequent DVT at this time and at a much higher risk of bleeding with Eliquis.  Plan to have patient discontinue Eliquis, instructed to monitor closely for signs of blood clot/pulmonary embolism and return if these develop.  Plan to have patient monitor her bleeding and call her OB/GYN on Monday to discuss possible intervention/D&C and given close ED return precautions if she were to develop worsening bleeding.  All questions were answered to the best my ability and patient is agreeable with plan.    Medical Decision Making    History:  Supplemental history from: Family Member/Significant Other  External Record(s) reviewed: [facility] ED visit 08/14/2023    Work Up:  Chart documentation includes differential considered and any EKGs or imaging independently interpreted by provider.  In additional to work up documented, I considered the following work up: Documented in chart, if applicable.    External consultation:  Discussion of management with another provider: OBGYN and Oncology.     Complicating factors:  Care impacted by chronic illness: N/A  Care affected by social determinants of health: N/A    Disposition considerations:  Admission considered, but with reassuring labs and imaging and after consultation with OBGYN and Heme-Onc plan  "for outpatient management with close ED return precautions.       MEDICATIONS GIVEN IN THE EMERGENCY:  Medications - No data to display    NEW PRESCRIPTIONS STARTED AT TODAY'S ER VISIT  Discharge Medication List as of 8/25/2023 11:28 PM             =================================================================    HPI    Patient information was obtained from: patient and SO    Use of : N/A       Toma Blanco is a 32 year old female with a pertinent history of DVT, history of miscarriage, and pregnancy complicated by fetal cerebral ventriculomegaly who presents to this ED via walk in for evaluation of vaginal bleeding    Per chart review,  The patient was seen on 08/25/20[facility] at [facility] for an evaluation of right lower quadrant abdominal pain. First trimester transvaginal ultrasound reveals 2 cystic structures without identifiable fetal poles at this time, patient estimates she is 5w3d gestation so possible that this represents early twin pregnancy. Labs reassuring, hCG 27,000 correlating to approximately 6 weeks gestation. UA without signs of infection. Unclear etiology for her discomfort today, suspected pregnancy related/ligamentous.     On Tuesday 08/22/2023 (~3 days ago) the patient states that she believes that she had a miscarriage after passing some tissue. She states that she was six weeks pregnant with twins at that time. However, she reports that she has not had an ultrasound or an OB appointment to confirm this.     Then today the patient reports that she began to endorse the sudden onset of heavy vaginal bleeding. She notes that she was passing large blood clots as well. In addition, the patient states that she endorses intermittent diffuse lower abdominal cramping but notes that it is not severe. The patient reports that she overall feels \"out of it\" and fatigued.     Additionally, the patient states that she is currently on the blood thinning medication Eliquis " due to a history of post-partum blood clots. However, she states that she stopped this blood thinner from Sunday till Tuesday (~ 3 days) and restarted it on Wednesday (~ 2 days ago).     She reports a history of one previous miscarriage in May 2023. The patient reports that she is RH positive. She denies any other complaints.       REVIEW OF SYSTEMS   Review of Systems   Constitutional:  Positive for fatigue.   Genitourinary:  Positive for vaginal bleeding.        PAST MEDICAL HISTORY:  Past Medical History:   Diagnosis Date    Acute deep vein thrombosis (DVT) of proximal vein of left lower extremity (H)     post partum    Anxiety     Hidradenitis suppurativa     Infection due to 2019 novel coronavirus     Nephrolithiasis     Plantar fascial fibromatosis of right foot     Varicella        PAST SURGICAL HISTORY:  Past Surgical History:   Procedure Laterality Date    DILATION AND CURETTAGE SUCTION N/A 6/2/2023    Procedure: DILATION AND CURETTAGE, UTERUS, USING SUCTION;  Surgeon: Lulu Leone MD;  Location: [facility] OR    EXCISE MASS FOOT Right 01/17/2023    Procedure: Excision and Neuroplasty of Right Plantar Foot Mass;  Surgeon: Hunter Redding MD;  Location: [facility] OR    [place] teeth extraction         CURRENT MEDICATIONS:    apixaban ANTICOAGULANT (ELIQUIS ANTICOAGULANT) 5 MG tablet  enoxaparin ANTICOAGULANT (LOVENOX) 40 MG/0.4ML syringe  Prenatal Vit-DSS-Fe Cbn-FA (PRENATAL AD PO)  progesterone (PROMETRIUM) 100 MG capsule  progesterone (PROMETRIUM) 200 MG capsule        ALLERGIES:  No Known Allergies    FAMILY HISTORY:  Family History   Problem Relation Age of Onset    Diabetes Father     Kidney Disease Father     Post Operative Nausea and Vomiting Sister     Diabetes Brother     Mental Illness Brother     Deep Vein Thrombosis (DVT) Maternal Grandmother     Dementia Maternal Grandfather     Heart Disease Maternal Grandfather     Dementia Paternal Grandmother     Diabetes Paternal Grandfather     Heart Failure  "Paternal Grandfather      Birth Son     Pulmonary Embolism Paternal Uncle        SOCIAL HISTORY:   Social History     Socioeconomic History    Marital status:      Spouse name: None    Number of children: None    Years of education: None    Highest education level: None   Tobacco Use    Smoking status: Never    Smokeless tobacco: Never   Vaping Use    Vaping Use: Never used   Substance and Sexual Activity    Alcohol use: Not Currently     Alcohol/week: 1.0 - 2.0 standard drink of alcohol     Types: 1 - 2 Standard drinks or equivalent per week     Comment: once weekly    Drug use: Never    Sexual activity: Yes     Partners: Male       VITALS:  /57   Pulse 74   Temp 98  F (36.7  C) (Oral)   Resp 16   Ht 1.778 m (5' 10\")   Wt 108.9 kg (240 lb)   LMP 2023   SpO2 98%   BMI 34.44 kg/m      PHYSICAL EXAM    Constitutional: Well developed, Well nourished, NAD, GCS 15   HENT: Normocephalic, Atraumatic  Neck- Supple, Nontender. Normal ROM.   Eyes: Conjunctiva normal.   Respiratory: No respiratory distress, speaking in full sentences.  Cardiovascular: Normal heart rate, Regular rhythm   GI: Soft, nontender. No distention or masses.    Musculoskeletal: No deformities, Moves all extremities equally.   Integument: Warm, Dry, No erythema, ecchymosis, or rash.  Neurologic: Alert & oriented x 3, Normal sensory function. No focal deficits.   Psychiatric: Affect normal, Judgment normal, Mood normal. Cooperative.      LAB:  All pertinent labs reviewed and interpreted.  Results for orders placed or performed during the hospital encounter of 23   US OB 1St Trimester Multiple w Transvag    Impression    IMPRESSION:    1.  Twin diamniotic / dichorionic gestation. Findings suspicious for but not diagnostic of pregnancy failure for Twins A&B. Both twins measure CRL of 0.6 cm which corresponds with 6 week gestational age, however no cardiac activity is seen with either   twin. Recommend following serial " beta-hcg and repeat ultrasound in 7-10 days.  2.  Large subchorionic hemorrhage seen to the right of the gestational sacs measuring 6.1 x 4.6 x 4.5 cm   CBC (+ platelets, no diff)   Result Value Ref Range    WBC Count 9.3 4.0 - 11.0 10e3/uL    RBC Count 4.22 3.80 - 5.20 10e6/uL    Hemoglobin 11.8 11.7 - 15.7 g/dL    Hematocrit 35.2 35.0 - 47.0 %    MCV 83 78 - 100 fL    MCH 28.0 26.5 - 33.0 pg    MCHC 33.5 31.5 - 36.5 g/dL    RDW 13.6 10.0 - 15.0 %    Platelet Count 282 150 - 450 10e3/uL   Basic metabolic panel   Result Value Ref Range    Sodium 138 136 - 145 mmol/L    Potassium 4.0 3.5 - 5.0 mmol/L    Chloride 106 98 - 107 mmol/L    Carbon Dioxide (CO2) 21 (L) 22 - 31 mmol/L    Anion Gap 11 5 - 18 mmol/L    Urea Nitrogen 8 8 - 22 mg/dL    Creatinine 0.65 0.60 - 1.10 mg/dL    Calcium 9.2 8.5 - 10.5 mg/dL    Glucose 89 70 - 125 mg/dL    GFR Estimate >90 >60 mL/min/1.73m2   HCG quantitative pregnancy (blood)   Result Value Ref Range    hCG Quantitative 72,612 (H) 0 - 4 mlU/mL   Adult Type and Screen   Result Value Ref Range    ABO/RH(D) AB POS     Antibody Screen Negative Negative    SPECIMEN EXPIRATION DATE [id number]        RADIOLOGY:  Reviewed all pertinent imaging. Please see official radiology report.  US OB 1St Trimester Multiple w Transvag   Final Result   IMPRESSION:     1.  Twin diamniotic / dichorionic gestation. Findings suspicious for but not diagnostic of pregnancy failure for Twins A&B. Both twins measure CRL of 0.6 cm which corresponds with 6 week gestational age, however no cardiac activity is seen with either    twin. Recommend following serial beta-hcg and repeat ultrasound in 7-10 days.   2.  Large subchorionic hemorrhage seen to the right of the gestational sacs measuring 6.1 x 4.6 x 4.5 cm          EKG:    None    PROCEDURES:   None      IAnisha am serving as a scribe to document services personally performed by Sindhu Tamayo PA-C based on my observation and the provider's  statements to me. I, Sindhu Tamayo PA-C, attest that Anisha Pearson is acting in a scribe capacity, has observed my performance of the services and has documented them in accordance with my direction.    Sindhu Tamayo PA-C  Emergency Medicine  [facility] EMERGENCY ROOM  [address] [address]  [phone]            Sindhu Tamayo PA-C  08/25/23 2101

## 2023-08-26 NOTE — DISCHARGE INSTRUCTIONS
Stop the Eliquis as we discussed.  You will likely continue to bleed and pass clots and have cramping.  Make sure you are staying hydrated.  You can use Tylenol, ibuprofen, heat packs, warm baths.    Call your OB/GYN on Monday to discuss D&C    Return to the emergency department or go directly to [facility] if you are not noticing worsening bleeding (more than 2 pads per hour for 2 hours or passing lemon sized clots), feeling lightheaded/dizzy/about to pass out, worsening or changing abdominal pain, vomiting/not keeping fluids down, shortness of breath, pain with deep breath in, leg pain or swelling, or any other concerning symptoms.  We would be happy to see you.

## 2023-08-28 ENCOUNTER — TELEPHONE (OUTPATIENT)
Dept: HEMATOLOGY | Facility: CLINIC | Age: 33
End: 2023-08-28
Payer: COMMERCIAL

## 2023-08-28 ENCOUNTER — TELEPHONE (OUTPATIENT)
Dept: OBGYN | Facility: CLINIC | Age: 33
End: 2023-08-28

## 2023-08-28 ENCOUNTER — ANCILLARY PROCEDURE (OUTPATIENT)
Dept: ULTRASOUND IMAGING | Facility: CLINIC | Age: 33
End: 2023-08-28
Attending: OBSTETRICS & GYNECOLOGY
Payer: COMMERCIAL

## 2023-08-28 DIAGNOSIS — O20.9 BLEEDING IN EARLY PREGNANCY: Primary | ICD-10-CM

## 2023-08-28 DIAGNOSIS — O20.9 BLEEDING IN EARLY PREGNANCY: ICD-10-CM

## 2023-08-28 DIAGNOSIS — Z01.818 PRE-OP EXAM: Primary | ICD-10-CM

## 2023-08-28 LAB
ABO/RH(D): NORMAL
ANTIBODY SCREEN: NEGATIVE
SPECIMEN EXPIRATION DATE: NORMAL

## 2023-08-28 PROCEDURE — 76817 TRANSVAGINAL US OBSTETRIC: CPT | Mod: TC | Performed by: RADIOLOGY

## 2023-08-28 RX ORDER — ACETAMINOPHEN 500 MG
500-1000 TABLET ORAL EVERY 8 HOURS PRN
COMMUNITY

## 2023-08-28 NOTE — TELEPHONE ENCOUNTER
Pre-op appt with PAC is a good idea. Is she taking lovenox or apixaban currently? It looks like she is on prophylactic anticoagulation for h/o DVT. I am not sure about Apixaban but usually we have patients stop lovenox 24h prior to procedure. Will defer to PAC clinic.    Thanks Aurora Mcmullen MD

## 2023-08-28 NOTE — PROGRESS NOTES
Preoperative Assessment Center Medication History Note  Medication history completed on August 28, 2023 by this writer prior to patient's PAC appointment. See Epic admission navigator for prior to admission medications. Operating room staff will still need to confirm medications and last dose information on day of surgery.     Medication history interview sources  Patient and CareEverywhere/SureScripts via phone    Changes made to PTA medication list  Added: tylenol.   Deleted: none  Changed: eliquis/lovenox/progesterone - all on hold currently.     Allergies reviewed with patient and updates made in EHR: yes    -- No recent (within 30 days) course of antibiotics  -- No recent (within 30 days) course of systemic steroids  -- Reports not being on blood thinning medications - eliquis and lovenox on hold due to bleeding.  Has appointment with hematology on 8/29 to determine plan moving forward after her procedure on 8/30/23.    -- Declines being on any other prescription or over-the-counter medications    Prior to Admission medications    Medication Sig Last Dose Taking? Auth Provider Long Term End Date   acetaminophen (TYLENOL) 500 MG tablet Take 500-1,000 mg by mouth every 8 hours as needed for mild pain Taking Yes Unknown, Entered By History No    Prenatal Vit-DSS-Fe Cbn-FA (PRENATAL AD PO) Take 1 tablet by mouth daily Taking Yes Reported, Patient     apixaban ANTICOAGULANT (ELIQUIS ANTICOAGULANT) 5 MG tablet Take 1 tablet (5 mg) by mouth 2 times daily  Patient not taking: Reported on 8/28/2023 Not Taking  Lulu Leone MD     enoxaparin ANTICOAGULANT (LOVENOX) 40 MG/0.4ML syringe Inject 0.4 mLs (40 mg) Subcutaneous daily  Patient not taking: Reported on 8/28/2023 Not Taking  Magdalena Haley PA-C     progesterone (PROMETRIUM) 100 MG capsule Take 2 capsules (200 mg) by mouth daily  Patient not taking: Reported on 8/28/2023 Not Taking  Karen Tapia MD     progesterone (PROMETRIUM) 200 MG  capsule Take 1 capsule (200 mg) by mouth daily Starting day after ovulation until 10 weeks gestation  Patient not taking: Reported on 8/28/2023 Not Taking  Lulu Leone MD          Medication History Completed By: Ambrocio Vargas [facility] 8/28/2023 12:15 PM      Eliquis Friday.

## 2023-08-28 NOTE — TELEPHONE ENCOUNTER
CLAUDY alerted Surgery Schedulers to fact that patient is on blood thinners and will need formal clearance from PCP/PAC prior to DOS. Tentatively scheduled for 7:45AM on 08/29/23, not yet discussed with patient but will confirm after discussion with KIMBERLY Barry/her/hers  [facility] Surgery Scheduler

## 2023-08-28 NOTE — TELEPHONE ENCOUNTER
Type of surgery: gyn  Location of surgery: [facility]  Date and time of surgery: 08/30/23 3PM  Surgeon: Parminder  Pre-Op Appt Date: 08/29/23 PAC  Post-Op Appt Date: 09/15/23 BE   Packet sent out:  will send via [employer]  Pre-cert/Authorization completed:  Not Applicable  Date: 08/28/23     HUMBLE Encarnacion  She/her/hers  [facility] Surgery Scheduler

## 2023-08-28 NOTE — TELEPHONE ENCOUNTER
FUTURE VISIT INFORMATION      SURGERY INFORMATION:  Date: 8/30/23  [facility]  Surgeon:  Deborah Zurita MD   Anesthesia Type:  MAC  Procedure: DILATION AND CURETTAGE, UTERUS, USING SUCTION     RECORDS REQUESTED FROM:       Primary Care Provider: Kristal    Pertinent Medical History: DVT

## 2023-08-28 NOTE — TELEPHONE ENCOUNTER
[id number]  Toma Blanco  32 year old female  CBCD Diagnosis: Hx of DVT  CBCD Provider: Magdalena Martin PA-C    Call received from Toma looking to get an appointment with Magdalena diaz to discuss questions around anticoagulation and pregnancy.    Toma states that she recently had her second miscarriage due to a subchorionic hemorrhage. Toma states that her OB believes that her Lovenox is contributing to this but has gotten instruction from Magdalena to start Lovenox once she knows she's pregnant. She wants to discuss this further and get clarity on what the best plan is because she is interested in trying to become pregnant again as soon as possible.    Plan made for staff to discuss this question with Magdalena and review her upcoming schedule for openings. Staff will call back later today with an update.    Jolly ADAIR, RN, PHN   [facility]   Office: [phone]  Fax: [phone]     Addendum:    Call back to Toma to offer tomorrow at 9am or 11am virtual with Magdalena. Toma accepts 9am virtual appointment. She denies additional concerns or questions at this time.    Jolly ADAIR, RN, PHN   [facility]   Office: [phone]  Fax: [phone]

## 2023-08-28 NOTE — TELEPHONE ENCOUNTER
Pt called clinic   [place] whoever spoke with her in ED regarding her ultrasound wasn't confident in their findings so wanted to know if MD could order a repeat ultrasound before D&C or have someone call her to discuss ultrasound     ED ultrasound:TWIN A:  MSD: 2.2 cm, corresponding with 7 week 2 days.  CRL: 0.6 cm, corresponding with 6 weeks 3 days.  FHR: No cardiac activity seen.     TWIN B:  MSD: 2.1 cm, corresponding with 7 week 1 day.  CRL: 0.6 cm, corresponding with 6 weeks 3 days.  FHR: No cardiac activity seen.                                                                IMPRESSION:    1.  Twin diamniotic / dichorionic gestation. Findings suspicious for but not diagnostic of pregnancy failure for Twins A&B. Both twins measure CRL of 0.6 cm which corresponds with 6 week gestational age, however no cardiac activity is seen with either   twin. Recommend following serial beta-hcg and repeat ultrasound in 7-10 days.  2.  Large subchorionic hemorrhage seen to the right of the gestational sacs measuring 6.1 x 4.6 x 4.5 cm    Would you be able to call her sometime today and talk about the ultrasound more in depth/answer some questions?  Routing to provider to advise.  Anisha Beasley RN on 8/28/2023 at 10:04 AM

## 2023-08-29 ENCOUNTER — PRE VISIT (OUTPATIENT)
Dept: SURGERY | Facility: CLINIC | Age: 33
End: 2023-08-29

## 2023-08-29 ENCOUNTER — OFFICE VISIT (OUTPATIENT)
Dept: SURGERY | Facility: CLINIC | Age: 33
End: 2023-08-29
Payer: COMMERCIAL

## 2023-08-29 ENCOUNTER — VIRTUAL VISIT (OUTPATIENT)
Dept: HEMATOLOGY | Facility: CLINIC | Age: 33
End: 2023-08-29
Attending: PHYSICIAN ASSISTANT
Payer: COMMERCIAL

## 2023-08-29 ENCOUNTER — LAB (OUTPATIENT)
Dept: LAB | Facility: CLINIC | Age: 33
End: 2023-08-29
Payer: COMMERCIAL

## 2023-08-29 ENCOUNTER — ANESTHESIA EVENT (OUTPATIENT)
Dept: SURGERY | Facility: CLINIC | Age: 33
End: 2023-08-29
Payer: COMMERCIAL

## 2023-08-29 VITALS
OXYGEN SATURATION: 100 % | BODY MASS INDEX: 35.22 KG/M2 | DIASTOLIC BLOOD PRESSURE: 67 MMHG | HEART RATE: 70 BPM | TEMPERATURE: 98.6 F | SYSTOLIC BLOOD PRESSURE: 102 MMHG | HEIGHT: 70 IN | RESPIRATION RATE: 20 BRPM | WEIGHT: 246 LBS

## 2023-08-29 DIAGNOSIS — Z87.59 HISTORY OF MISCARRIAGE: ICD-10-CM

## 2023-08-29 DIAGNOSIS — O02.1 MISSED ABORTION: ICD-10-CM

## 2023-08-29 DIAGNOSIS — I82.890 SUPERFICIAL VEIN THROMBOSIS: ICD-10-CM

## 2023-08-29 DIAGNOSIS — Z86.718 PERSONAL HISTORY OF DVT (DEEP VEIN THROMBOSIS): Primary | ICD-10-CM

## 2023-08-29 DIAGNOSIS — Z01.818 PRE-OP EXAM: ICD-10-CM

## 2023-08-29 DIAGNOSIS — Z71.89 ENCOUNTER FOR ANTICOAGULATION DISCUSSION AND COUNSELING: ICD-10-CM

## 2023-08-29 DIAGNOSIS — O20.9 BLEEDING IN EARLY PREGNANCY: ICD-10-CM

## 2023-08-29 DIAGNOSIS — Z87.59 HISTORY OF FETAL LOSS: ICD-10-CM

## 2023-08-29 DIAGNOSIS — Z01.818 PRE-OP EVALUATION: Primary | ICD-10-CM

## 2023-08-29 LAB
ERYTHROCYTE [DISTWIDTH] IN BLOOD BY AUTOMATED COUNT: 13.7 % (ref 10–15)
HCT VFR BLD AUTO: 36.1 % (ref 35–47)
HGB BLD-MCNC: 12.2 G/DL (ref 11.7–15.7)
MCH RBC QN AUTO: 28.2 PG (ref 26.5–33)
MCHC RBC AUTO-ENTMCNC: 33.8 G/DL (ref 31.5–36.5)
MCV RBC AUTO: 84 FL (ref 78–100)
PLATELET # BLD AUTO: 289 10E3/UL (ref 150–450)
RBC # BLD AUTO: 4.32 10E6/UL (ref 3.8–5.2)
SARS-COV-2 RNA RESP QL NAA+PROBE: NEGATIVE
T3 SERPL-MCNC: 134 NG/DL (ref 85–202)
T4 FREE SERPL-MCNC: 1.3 NG/DL (ref 0.9–1.7)
THYROGLOB AB SERPL IA-ACNC: <20 IU/ML
THYROPEROXIDASE AB SERPL-ACNC: <10 IU/ML
WBC # BLD AUTO: 8.4 10E3/UL (ref 4–11)

## 2023-08-29 PROCEDURE — 86800 THYROGLOBULIN ANTIBODY: CPT | Performed by: OBSTETRICS & GYNECOLOGY

## 2023-08-29 PROCEDURE — 86901 BLOOD TYPING SEROLOGIC RH(D): CPT | Performed by: OBSTETRICS & GYNECOLOGY

## 2023-08-29 PROCEDURE — 99213 OFFICE O/P EST LOW 20 MIN: CPT | Mod: 24 | Performed by: PHYSICIAN ASSISTANT

## 2023-08-29 PROCEDURE — 36415 COLL VENOUS BLD VENIPUNCTURE: CPT | Performed by: PATHOLOGY

## 2023-08-29 PROCEDURE — 85027 COMPLETE CBC AUTOMATED: CPT | Performed by: PATHOLOGY

## 2023-08-29 PROCEDURE — 87635 SARS-COV-2 COVID-19 AMP PRB: CPT | Performed by: OBSTETRICS & GYNECOLOGY

## 2023-08-29 PROCEDURE — 99215 OFFICE O/P EST HI 40 MIN: CPT | Mod: VID | Performed by: PHYSICIAN ASSISTANT

## 2023-08-29 PROCEDURE — 84439 ASSAY OF FREE THYROXINE: CPT | Performed by: PATHOLOGY

## 2023-08-29 PROCEDURE — 84480 ASSAY TRIIODOTHYRONINE (T3): CPT | Performed by: OBSTETRICS & GYNECOLOGY

## 2023-08-29 PROCEDURE — 86850 RBC ANTIBODY SCREEN: CPT | Performed by: OBSTETRICS & GYNECOLOGY

## 2023-08-29 PROCEDURE — 86376 MICROSOMAL ANTIBODY EACH: CPT | Performed by: OBSTETRICS & GYNECOLOGY

## 2023-08-29 PROCEDURE — 99000 SPECIMEN HANDLING OFFICE-LAB: CPT | Performed by: PATHOLOGY

## 2023-08-29 ASSESSMENT — ENCOUNTER SYMPTOMS: SEIZURES: 0

## 2023-08-29 ASSESSMENT — PAIN SCALES - GENERAL: PAINLEVEL: MILD PAIN (2)

## 2023-08-29 NOTE — H&P (VIEW-ONLY)
Pre-Operative H & P     CC:  Preoperative exam to assess for increased cardiopulmonary risk while undergoing surgery and anesthesia.    Date of Encounter: 2023  Primary Care Physician:  Chantal Bazan     Reason for visit:   Encounter Diagnoses   Name Primary?    Pre-op evaluation Yes    Missed         HPI  Toma Blanco is a 32 year old female who presents for pre-operative H & P in preparation for  Procedure Information       Case: [id number] Date/Time: 23 1500    Procedure: DILATION AND CURETTAGE, UTERUS, USING SUCTION (Uterus)    Anesthesia type: MAC    Diagnosis: Missed  [O02.1]    Pre-op diagnosis: Missed  [O02.1]    Location:  OR  / [facility]    Providers: Deborah Zurita MD            Patient is being evaluated for comorbid conditions of DVT, obesity, nephrolithiasis, hidradenitis, and anxiety.    She presented to the ED on 23 for heavy vaginal bleeding x 5 days with clots/products passed. An ultrasound was performed showing twin diamniotic/dichorionic gestation with findings suspicious for pregnancy failure, particularly in the setting of downtrending HCG. She was also noted to have a large subchorionic hemorrhage. She had been on Eliquis for history of DVT in pregnancy. OB and hematology were consulted and Eliquis was discontinued and plan was made for outpatient D&C. She is now scheduled for surgery as above.    History is obtained from the patient and chart review    Hx of abnormal bleeding or anti-platelet use: Eliquis, last dose 23    Menstrual history: Patient's last menstrual period was 2023.     Past Medical History  Past Medical History:   Diagnosis Date    Acute deep vein thrombosis (DVT) of proximal vein of left lower extremity (H)     post partum    Anxiety     Hidradenitis suppurativa     Infection due to 2019 novel coronavirus     Nephrolithiasis     Plantar fascial fibromatosis of right foot     Varicella        Past Surgical  History  Past Surgical History:   Procedure Laterality Date    DILATION AND CURETTAGE SUCTION N/A 6/2/2023    Procedure: DILATION AND CURETTAGE, UTERUS, USING SUCTION;  Surgeon: Lulu Leone MD;  [facility]    EXCISE MASS FOOT Right 01/17/2023    Procedure: Excision and Neuroplasty of Right Plantar Foot Mass;  Surgeon: Hunter Redding MD;  Location: [facility]    [place] teeth extraction         Prior to Admission Medications  Current Outpatient Medications   Medication Sig Dispense Refill    acetaminophen (TYLENOL) 500 MG tablet Take 500-1,000 mg by mouth every 8 hours as needed for mild pain      Prenatal Vit-DSS-Fe Cbn-FA (PRENATAL AD PO) Take 1 tablet by mouth daily      apixaban ANTICOAGULANT (ELIQUIS ANTICOAGULANT) 5 MG tablet Take 1 tablet (5 mg) by mouth 2 times daily (Patient not taking: Reported on 8/28/2023) 28 tablet 0    enoxaparin ANTICOAGULANT (LOVENOX) 40 MG/0.4ML syringe Inject 0.4 mLs (40 mg) Subcutaneous daily (Patient not taking: Reported on 8/28/2023) 12 mL 3    progesterone (PROMETRIUM) 100 MG capsule Take 2 capsules (200 mg) by mouth daily (Patient not taking: Reported on 8/28/2023) 60 capsule 3    progesterone (PROMETRIUM) 200 MG capsule Take 1 capsule (200 mg) by mouth daily Starting day after ovulation until 10 weeks gestation (Patient not taking: Reported on 8/28/2023) 90 capsule 3       Allergies  No Known Allergies    Social History  Social History     Socioeconomic History    Marital status:      Spouse name: Not on file    Number of children: Not on file    Years of education: Not on file    Highest education level: Not on file   Occupational History    Not on file   Tobacco Use    Smoking status: Never     Passive exposure: Never    Smokeless tobacco: Never   Vaping Use    Vaping Use: Never used   Substance and Sexual Activity    Alcohol use: Yes     Alcohol/week: 1.0 - 2.0 standard drink of alcohol     Types: 1 - 2 Standard drinks or equivalent per week     Comment:  1-2 twice a week    Drug use: Never    Sexual activity: Yes     Partners: Male   Other Topics Concern    Not on file   Social History Narrative    Not on file     Social Determinants of Health     Financial Resource Strain: Not on file   Food Insecurity: Not on file   Transportation Needs: Not on file   Physical Activity: Not on file   Stress: Not on file   Social Connections: Not on file   Intimate Partner Violence: Not on file   Housing Stability: Not on file       Family History  Family History   Problem Relation Age of Onset    Diabetes Father     Kidney Disease Father     Post Operative Nausea and Vomiting Sister     Diabetes Brother     Mental Illness Brother     Deep Vein Thrombosis (DVT) Maternal Grandmother     Dementia Maternal Grandfather     Heart Disease Maternal Grandfather     Dementia Paternal Grandmother     Diabetes Paternal Grandfather     Heart Failure Paternal Grandfather      Birth Son     Pulmonary Embolism Paternal Uncle     Anesthesia Reaction No family hx of        Review of Systems  The complete review of systems is negative other than noted in the HPI or here.   Anesthesia Evaluation   Pt has had prior anesthetic.     No history of anesthetic complications       ROS/MED HX  ENT/Pulmonary:  - neg pulmonary ROS     Neurologic:     (+)      migraines (occular, rare),                       (-) no seizures   Cardiovascular: Comment: Denies exertional symptoms - neg cardiovascular ROS     METS/Exercise Tolerance: >4 METS    Hematologic:     (+) History of blood clots,            (-) history of blood transfusion   Musculoskeletal:  - neg musculoskeletal ROS     GI/Hepatic:  - neg GI/hepatic ROS     Renal/Genitourinary: Comment: Missed     (+)       Nephrolithiasis ,       Endo:     (+)          thyroid problem, Thyroid disease - Other nodule,    Obesity,    (-) Type II DM   Psychiatric/Substance Use:     (+) psychiatric history anxiety       Infectious Disease:  - neg infectious  "disease ROS     Malignancy:  - neg malignancy ROS     Other: Comment: Hidradenitis           /67 (BP Location: Right arm, Patient Position: Sitting, Cuff Size: Adult Large)   Pulse 70   Temp 98.6  F (37  C) (Oral)   Resp 20   Ht 1.778 m (5' 10\")   Wt 111.6 kg (246 lb)   LMP 07/07/2023   SpO2 100%   Breastfeeding No   BMI 35.30 kg/m      Physical Exam   Constitutional: Pleasant, well-appearing female, no apparent distress, and appears stated age.  Eyes: Pupils equal, round and reactive to light, extra ocular muscles intact, sclera clear, conjunctiva normal.  HENT: Normocephalic and atraumatic, oral pharynx with moist mucus membranes, good dentition. No goiter appreciated.   Respiratory: Clear to auscultation bilaterally, no crackles or wheezing.  Cardiovascular: Regular rate and rhythm, normal S1 and S2, and no murmur noted.  Carotids +2, no bruits. No edema. Palpable pulses to radial  DP and PT arteries.   GI: Normal bowel sounds, soft, non-distended, non-tender, no masses palpated, no hepatosplenomegaly.  Lymph/Hematologic: No cervical lymphadenopathy and no supraclavicular lymphadenopathy.  Genitourinary:  Deferred  Skin: Warm and dry.  No rashes on exposed skin   Musculoskeletal: Full ROM of neck. There is no redness, warmth, or swelling of the visible joints. Gross motor strength is normal.    Neurologic: Awake, alert, oriented to name, place and time. Cranial nerves II-XII are grossly intact. Gait is normal.   Neuropsychiatric: Calm, cooperative. Normal affect.       Prior Labs/Diagnostic Studies   All labs and imaging personally reviewed     EKG/ stress test - if available please see in ROS above   No results found.    The patient's records and results personally reviewed by this provider.     Outside records reviewed from: Care Everywhere    LAB/DIAGNOSTIC STUDIES TODAY:  None    Assessment  Toma Blanco is a 32 year old female seen as a PAC referral for risk assessment and optimization " "for anesthesia.    Plan/Recommendations  Pt will be optimized for the proposed procedure.  See below for details on the assessment, risk, and preoperative recommendations    NEUROLOGY  - No history of TIA, CVA or seizure    -Post Op delirium risk factors:  No risk identified    ENT  - No current airway concerns.  Will need to be reassessed day of surgery.  Mallampati: I  TM: > 3    CARDIAC  - No history of CAD, Hypertension, and Afib  - Patient denies cardiac history or cardiac symptoms.    - METS (Metabolic Equivalents)  Patient performs 4 or more METS exercise without symptoms            Total Score: 0      RCRI-Very low risk: Class 1 0.4% complication rate            Total Score: 0        PULMONARY    DIMAS Low Risk            Total Score: 1    DIMAS: BMI over 35 kg/m2      - Denies asthma or inhaler use  - Tobacco History    History   Smoking Status    Never   Smokeless Tobacco    Never       GI    PONV Medium Risk  Total Score: 2           1 AN PONV: Pt is Female    1 AN PONV: Patient is not a current smoker        /RENAL  - Baseline Creatinine  0.65  - History of nephrolithiasis s/p cystoscopy, rt [facility], lithotripsy under care of Dr. Gramajo 23   - Vaginal bleeding/missed ; surgery as scheduled above    ENDOCRINE    - BMI: Estimated body mass index is 35.3 kg/m  as calculated from the following:    Height as of this encounter: 1.778 m (5' 10\").    Weight as of this encounter: 111.6 kg (246 lb).  Class 3 Obesity (BMI > 40)  - No history of Diabetes Mellitus    HEME  VTE Medium Risk 1.8%            Total Score: 7    VTE: Pt history of VTE    VTE: Family Hx of VTE        - History of DVT in pregnancy and history of superficial thrombophlebitis  - Eliquis and lovenox on hold due to bleeding. Has appointment with hematology on  to determine plan moving forward after her procedure on 23.     PSYCH  - Anxiety, not currently on medications    OTHER  - History of hidradenitis, no current concerns. "     Different anesthesia methods/types have been discussed with the patient, but they are aware that the final plan will be decided by the assigned anesthesia provider on the date of service.    The patient is optimized for their procedure. AVS with information on surgery time/arrival time, meds and NPO status given by nursing staff. No further diagnostic testing indicated.      On the day of service:     Prep time: 5 minutes  Visit time: 12 minutes  Documentation time: 12 minutes  ------------------------------------------  Total time: 29 minutes      Tabitha Zavala PA-C  Preoperative Assessment Center  [facility]  [facility] and [facility]  Phone: [phone]  Fax: [phone]

## 2023-08-29 NOTE — PROGRESS NOTES
Patient was contacted to complete the pre-visit call prior to their video visit with the provider.      Allergies and medications were reviewed.    I thanked them for their time to cover this information   Cierra Simms CMA

## 2023-08-29 NOTE — PATIENT INSTRUCTIONS
Preparing for Your Surgery      Name:  Toma Blanco   MRN:  [id number]   :  1990   Today's Date:  2023       Arriving for surgery:  Surgery date:  23  Arrival time:  1.00PM    Please come to:     Please come to:      [facility] [address]  [address] [place]  [address]  The Green Ramp for patients and visitors is located beneath the [facility]. The parking facility entrance is at the intersection of [address] [place] [address] and [address]. Patients and visitors who self-park will receive the reduced hospital parking rate (no ticket validation needed).  [employer] parking, located at the [facility] main entrance on [address] [place] [address], is available Monday - Friday from 7 am to 3:30 pm.  Discounted parking pass options can be purchased from  attendants during business hours.  -Check in at the security desk in the [facility] ([facility] [place]) Lobby. They will direct you to the correct elevators.  -Proceed to the 3rd floor, check in at the Adult Surgery Waiting Lounge. [phone]  If you are in need of directions, a wheelchair or escort please stop at the Information Desk in the lobby.  Inform the information person that you are here for surgery; a wheelchair and escort to [address] will be provided.   An escort to the Adult Surgery Waiting Lounge will be provided.    What can I eat or drink?  -  You may eat and drink normally up to 8 hours prior to arrival time. (Until 5.00AM)  -  You may have clear liquids until 2 hours prior to arrival time. (Until 11.00AM)    Examples of clear liquids:  Water  Clear broth  Juices (apple, white grape, white cranberry  and cider) without pulp  Noncarbonated, powder based beverages  (lemonade and Riaz-Aid)  Sodas (Sprite, 7-Up, ginger ale and seltzer)  Coffee or tea (without milk or cream)  Gatorade    -  No Alcohol or cannabis  products for at least 24 hours before surgery.     Which medicines can I take?    Hold Aspirin for 7 days before surgery.   Hold Multivitamins for 7 days before surgery.  Hold Supplements for 7 days before surgery.  Hold Ibuprofen (Advil, Motrin) for 1 day(s) before surgery--unless otherwise directed by surgeon.  Hold Naproxen (Aleve) for 4 days before surgery.    -  DO NOT take these medications the day of surgery:  Pre- Vitamin    -  PLEASE TAKE these medications the day of surgery:  Tylenol (as needed).    How do I prepare myself?  - Please take 2 showers (one the night prior to surgery and one the morning of surgery) using Scrubcare or Hibiclens soap.    Use this soap only from the neck to your toes.     Leave the soap on your skin for one minute--then rinse thoroughly.      You may use your own shampoo and conditioner. No other hair products.   - Please remove all jewelry and body piercings.  - No lotions, deodorants or fragrance.  - No makeup or fingernail polish.   - Bring your ID and insurance card.    -If you have a Deep Brain Stimulator, Spinal Cord Stimulator, or any Neuro Stimulator device---you must bring the remote control to the hospital.      ALL PATIENTS GOING HOME THE SAME DAY OF SURGERY ARE REQUIRED TO HAVE A RESPONSIBLE ADULT TO DRIVE AND BE IN ATTENDANCE WITH THEM FOR 24 HOURS FOLLOWING SURGERY.    Covid testing policy as of 2022  Your surgeon will notify and schedule you for a COVID test if one is needed before surgery--please direct any questions or COVID symptoms to your surgeon      Questions or Concerns:    - For any questions regarding the day of surgery or your hospital stay, please contact the Pre Admission Nursing Office at [phone].       - If you have health changes between today and your surgery, please call your surgeon.       - For questions after surgery, please call your surgeons office.           Current Visitor Guidelines    You may have 2 visitors in the pre op  area.    Visiting hours: 8 a.m. to 8:30 p.m.    You may have four visitors during your inpatient hospital stay.    Patients confirmed or suspected to have symptoms of COVID 19 or flu:     No visitors allowed for adult patients.   Children (under age 18) can have 1 named visitor.     People who are sick or showing symptoms of COVID 19 or flu:    Are not allowed to visit patients--we can only make exceptions in special situations.       Please follow these guidelines for your visit:          Please maintain social distance          Masking is optional--however at times you may be asked to wear a mask for the safety of yourself and others     Clean your hands with alcohol hand . Do this when you arrive at and leave the building and patient room,    And again after you touch your mask or anything in the room.     Go directly to and from the room you are visiting.     Stay in the patient s room during your visit. Limit going to other places in the hospital as much as possible     Leave bags and jackets at home or in the car.     For everyone s health, please don t come and go during your visit. That includes for smoking   during your visit.

## 2023-08-29 NOTE — H&P
Pre-Operative H & P     CC:  Preoperative exam to assess for increased cardiopulmonary risk while undergoing surgery and anesthesia.    Date of Encounter: 2023  Primary Care Physician:  Chantal Bazan     Reason for visit:   Encounter Diagnoses   Name Primary?    Pre-op evaluation Yes    Missed         HPI  Toma Blanco is a 32 year old female who presents for pre-operative H & P in preparation for  Procedure Information       Case: [id number] Date/Time: 23 1500    Procedure: DILATION AND CURETTAGE, UTERUS, USING SUCTION (Uterus)    Anesthesia type: MAC    Diagnosis: Missed  [O02.1]    Pre-op diagnosis: Missed  [O02.1]    Location:  OR  / [facility]    Providers: Deborah Zurita MD            Patient is being evaluated for comorbid conditions of DVT, obesity, nephrolithiasis, hidradenitis, and anxiety.    She presented to the ED on 23 for heavy vaginal bleeding x 5 days with clots/products passed. An ultrasound was performed showing twin diamniotic/dichorionic gestation with findings suspicious for pregnancy failure, particularly in the setting of downtrending HCG. She was also noted to have a large subchorionic hemorrhage. She had been on Eliquis for history of DVT in pregnancy. OB and hematology were consulted and Eliquis was discontinued and plan was made for outpatient D&C. She is now scheduled for surgery as above.    History is obtained from the patient and chart review    Hx of abnormal bleeding or anti-platelet use: Eliquis, last dose 23    Menstrual history: Patient's last menstrual period was 2023.     Past Medical History  Past Medical History:   Diagnosis Date    Acute deep vein thrombosis (DVT) of proximal vein of left lower extremity (H)     post partum    Anxiety     Hidradenitis suppurativa     Infection due to 2019 novel coronavirus     Nephrolithiasis     Plantar fascial fibromatosis of right foot     Varicella        Past Surgical  History  Past Surgical History:   Procedure Laterality Date    DILATION AND CURETTAGE SUCTION N/A 6/2/2023    Procedure: DILATION AND CURETTAGE, UTERUS, USING SUCTION;  Surgeon: Lulu Leone MD;  Location: [facility]    EXCISE MASS FOOT Right 01/17/2023    Procedure: Excision and Neuroplasty of Right Plantar Foot Mass;  Surgeon: Hunter Redding MD;  Location: [facility]    [place] teeth extraction         Prior to Admission Medications  Current Outpatient Medications   Medication Sig Dispense Refill    acetaminophen (TYLENOL) 500 MG tablet Take 500-1,000 mg by mouth every 8 hours as needed for mild pain      Prenatal Vit-DSS-Fe Cbn-FA (PRENATAL AD PO) Take 1 tablet by mouth daily      apixaban ANTICOAGULANT (ELIQUIS ANTICOAGULANT) 5 MG tablet Take 1 tablet (5 mg) by mouth 2 times daily (Patient not taking: Reported on 8/28/2023) 28 tablet 0    enoxaparin ANTICOAGULANT (LOVENOX) 40 MG/0.4ML syringe Inject 0.4 mLs (40 mg) Subcutaneous daily (Patient not taking: Reported on 8/28/2023) 12 mL 3    progesterone (PROMETRIUM) 100 MG capsule Take 2 capsules (200 mg) by mouth daily (Patient not taking: Reported on 8/28/2023) 60 capsule 3    progesterone (PROMETRIUM) 200 MG capsule Take 1 capsule (200 mg) by mouth daily Starting day after ovulation until 10 weeks gestation (Patient not taking: Reported on 8/28/2023) 90 capsule 3       Allergies  No Known Allergies    Social History  Social History     Socioeconomic History    Marital status:      Spouse name: Not on file    Number of children: Not on file    Years of education: Not on file    Highest education level: Not on file   Occupational History    Not on file   Tobacco Use    Smoking status: Never     Passive exposure: Never    Smokeless tobacco: Never   Vaping Use    Vaping Use: Never used   Substance and Sexual Activity    Alcohol use: Yes     Alcohol/week: 1.0 - 2.0 standard drink of alcohol     Types: 1 - 2 Standard drinks or equivalent per week     Comment:  1-2 twice a week    Drug use: Never    Sexual activity: Yes     Partners: Male   Other Topics Concern    Not on file   Social History Narrative    Not on file     Social Determinants of Health     Financial Resource Strain: Not on file   Food Insecurity: Not on file   Transportation Needs: Not on file   Physical Activity: Not on file   Stress: Not on file   Social Connections: Not on file   Intimate Partner Violence: Not on file   Housing Stability: Not on file       Family History  Family History   Problem Relation Age of Onset    Diabetes Father     Kidney Disease Father     Post Operative Nausea and Vomiting Sister     Diabetes Brother     Mental Illness Brother     Deep Vein Thrombosis (DVT) Maternal Grandmother     Dementia Maternal Grandfather     Heart Disease Maternal Grandfather     Dementia Paternal Grandmother     Diabetes Paternal Grandfather     Heart Failure Paternal Grandfather      Birth Son     Pulmonary Embolism Paternal Uncle     Anesthesia Reaction No family hx of        Review of Systems  The complete review of systems is negative other than noted in the HPI or here.   Anesthesia Evaluation   Pt has had prior anesthetic.     No history of anesthetic complications       ROS/MED HX  ENT/Pulmonary:  - neg pulmonary ROS     Neurologic:     (+)      migraines (occular, rare),                       (-) no seizures   Cardiovascular: Comment: Denies exertional symptoms - neg cardiovascular ROS     METS/Exercise Tolerance: >4 METS    Hematologic:     (+) History of blood clots,            (-) history of blood transfusion   Musculoskeletal:  - neg musculoskeletal ROS     GI/Hepatic:  - neg GI/hepatic ROS     Renal/Genitourinary: Comment: Missed     (+)       Nephrolithiasis ,       Endo:     (+)          thyroid problem, Thyroid disease - Other nodule,    Obesity,    (-) Type II DM   Psychiatric/Substance Use:     (+) psychiatric history anxiety       Infectious Disease:  - neg infectious  "disease ROS     Malignancy:  - neg malignancy ROS     Other: Comment: Hidradenitis           /67 (BP Location: Right arm, Patient Position: Sitting, Cuff Size: Adult Large)   Pulse 70   Temp 98.6  F (37  C) (Oral)   Resp 20   Ht 1.778 m (5' 10\")   Wt 111.6 kg (246 lb)   LMP 07/07/2023   SpO2 100%   Breastfeeding No   BMI 35.30 kg/m      Physical Exam   Constitutional: Pleasant, well-appearing female, no apparent distress, and appears stated age.  Eyes: Pupils equal, round and reactive to light, extra ocular muscles intact, sclera clear, conjunctiva normal.  HENT: Normocephalic and atraumatic, oral pharynx with moist mucus membranes, good dentition. No goiter appreciated.   Respiratory: Clear to auscultation bilaterally, no crackles or wheezing.  Cardiovascular: Regular rate and rhythm, normal S1 and S2, and no murmur noted.  Carotids +2, no bruits. No edema. Palpable pulses to radial  DP and PT arteries.   GI: Normal bowel sounds, soft, non-distended, non-tender, no masses palpated, no hepatosplenomegaly.  Lymph/Hematologic: No cervical lymphadenopathy and no supraclavicular lymphadenopathy.  Genitourinary:  Deferred  Skin: Warm and dry.  No rashes on exposed skin   Musculoskeletal: Full ROM of neck. There is no redness, warmth, or swelling of the visible joints. Gross motor strength is normal.    Neurologic: Awake, alert, oriented to name, place and time. Cranial nerves II-XII are grossly intact. Gait is normal.   Neuropsychiatric: Calm, cooperative. Normal affect.       Prior Labs/Diagnostic Studies   All labs and imaging personally reviewed     EKG/ stress test - if available please see in ROS above   No results found.    The patient's records and results personally reviewed by this provider.     Outside records reviewed from: Care Everywhere    LAB/DIAGNOSTIC STUDIES TODAY:  None    Assessment  Toma Blanco is a 32 year old female seen as a PAC referral for risk assessment and optimization " "for anesthesia.    Plan/Recommendations  Pt will be optimized for the proposed procedure.  See below for details on the assessment, risk, and preoperative recommendations    NEUROLOGY  - No history of TIA, CVA or seizure    -Post Op delirium risk factors:  No risk identified    ENT  - No current airway concerns.  Will need to be reassessed day of surgery.  Mallampati: I  TM: > 3    CARDIAC  - No history of CAD, Hypertension, and Afib  - Patient denies cardiac history or cardiac symptoms.    - METS (Metabolic Equivalents)  Patient performs 4 or more METS exercise without symptoms            Total Score: 0      RCRI-Very low risk: Class 1 0.4% complication rate            Total Score: 0        PULMONARY    DIMAS Low Risk            Total Score: 1    DIMAS: BMI over 35 kg/m2      - Denies asthma or inhaler use  - Tobacco History    History   Smoking Status    Never   Smokeless Tobacco    Never       GI    PONV Medium Risk  Total Score: 2           1 AN PONV: Pt is Female    1 AN PONV: Patient is not a current smoker        /RENAL  - Baseline Creatinine  0.65  - History of nephrolithiasis s/p cystoscopy, rt [facility], lithotripsy under care of Dr. Gramajo 23   - Vaginal bleeding/missed ; surgery as scheduled above    ENDOCRINE    - BMI: Estimated body mass index is 35.3 kg/m  as calculated from the following:    Height as of this encounter: 1.778 m (5' 10\").    Weight as of this encounter: 111.6 kg (246 lb).  Class 3 Obesity (BMI > 40)  - No history of Diabetes Mellitus    HEME  VTE Medium Risk 1.8%            Total Score: 7    VTE: Pt history of VTE    VTE: Family Hx of VTE        - History of DVT in pregnancy and history of superficial thrombophlebitis  - Eliquis and lovenox on hold due to bleeding. Has appointment with hematology on  to determine plan moving forward after her procedure on 23.     PSYCH  - Anxiety, not currently on medications    OTHER  - History of hidradenitis, no current concerns. "     Different anesthesia methods/types have been discussed with the patient, but they are aware that the final plan will be decided by the assigned anesthesia provider on the date of service.    The patient is optimized for their procedure. AVS with information on surgery time/arrival time, meds and NPO status given by nursing staff. No further diagnostic testing indicated.      On the day of service:     Prep time: 5 minutes  Visit time: 12 minutes  Documentation time: 12 minutes  ------------------------------------------  Total time: 29 minutes      Tabitha Zavala PA-C  Preoperative Assessment Center  [facility]  [facility]  Phone: [phone]  Fax: [phone]

## 2023-08-29 NOTE — PATIENT INSTRUCTIONS
[facility]  [facility]  [facility] [address], [address], [place] [address]  Main: [phone], Fax: [phone]    Toma,   It was a pleasure seeing you today.  Thank you for allowing us to be involved in your care.  Please let us know if there is anything else we can do for you, so that we can be sure you are leaving completely satisfied with your care experience. Below is the plan that we discussed.     After D&C, take Eliquis 2.5mg twice daily x 5 days. Call if any signs of blood clots.   Call if positive pregnancy test. Will plan on waiting to start Lovenox until after viability ultrasound to check baby and also placenta.   Repeat labs at end of October. Schedule lab only appointment ~ 10/29/2023.   Follow up with me after, virtual visit okay.       We would like a provider on our team to see you at least annually for optimal care and to allow us to continue to prescribe for you.      Return to clinic in 11/2023    Your nurse clinician is Cassandra Bernard, MSN, RN, [phone].   If they are unavailable and you have immediate concerns, please call [phone] and ask for a nurse.     Magdalena Collins, MPH, PA-C  [facility]

## 2023-08-29 NOTE — PROGRESS NOTES
[facility]  [address] [place] [address] [address], [place] [address]  Main: [phone], Fax: [phone]      Video Virtual Visit Note:    Patient: Toma Blanco  MRN: [id number]  : 1990  GERI: 2023      Due to the ongoing COVID-19 outbreak, this visit was conducted by video, with the patient's approval.      Reason of today's visit:  Follow up, recurrent miscarriage, hx of postpartum DVT    Clinical History Summary:  Toma Blanco is a 32 year old female with past medical history significant for post partum DVT and superficial thrombophlebitis after D&C procedure who presents today for follow up.     She recently established care and underwent hypercoagulable evaluation as her family history is notable for paternal uncle and paternal great aunt who both had fatal pulmonary emboli. She was planning on TTC again after miscarriage and wanted to discuss plans for anticoagulation.    Her FVL and prothrombin gene mutation testing was negative at outside system. Her protein C, S, and antithormbin III levels were normal. She did have APS labs due to history of miscarriage and thrombosis that showed negative lupus anticoagulant, negative beta-2-glycoprotein antibodies and a very weak positive cardiolipin IgG antibody at 15. Clinically significant positive is >40 for antiphospholipid antibody syndrome. Repeat labs were scheduled for 3 months.    Shortly after our visit, she notified the office she was pregnant. Due to delay with insurance, she did not start Lovenox until 6 weeks pregnant. She unfortunately, had some spotting and twin miscarriage with large subchorionic hemorrhage. She is scheduled to undergo D&C tomorrow.     After her last D&C, she developed superficial thrombophlebitis. She is no longer taking Lovenox.     Interim History:  Today, Toma reports that she is doing fair. Reports good support system. She is planning D&C tomorrow and is worried  about superficial vein thrombosis afterwards. She does have both Eliquis 5mg and 2.5mg tabs and Lovenox at home.      She also would like to discuss her hypercoagulable work up to date and additional testing options. We also discussed plan for future pregnancies.     ROS:  Spotting improved but was passing large clots.   No leg pain, swelling, dyspnea, chest pain at present.    Medications:   Current Outpatient Medications   Medication Sig Dispense Refill    acetaminophen (TYLENOL) 500 MG tablet Take 500-1,000 mg by mouth every 8 hours as needed for mild pain (Patient not taking: Reported on 8/29/2023)      apixaban ANTICOAGULANT (ELIQUIS ANTICOAGULANT) 5 MG tablet Take 1 tablet (5 mg) by mouth 2 times daily (Patient not taking: Reported on 8/28/2023) 28 tablet 0    enoxaparin ANTICOAGULANT (LOVENOX) 40 MG/0.4ML syringe Inject 0.4 mLs (40 mg) Subcutaneous daily (Patient not taking: Reported on 8/28/2023) 12 mL 3    Prenatal Vit-DSS-Fe Cbn-FA (PRENATAL AD PO) Take 1 tablet by mouth daily (Patient not taking: Reported on 8/29/2023)      progesterone (PROMETRIUM) 100 MG capsule Take 2 capsules (200 mg) by mouth daily (Patient not taking: Reported on 8/28/2023) 60 capsule 3    progesterone (PROMETRIUM) 200 MG capsule Take 1 capsule (200 mg) by mouth daily Starting day after ovulation until 10 weeks gestation (Patient not taking: Reported on 8/28/2023) 90 capsule 3        Allergies:    No Known Allergies    PMH:   Past Medical History:   Diagnosis Date    Acute deep vein thrombosis (DVT) of proximal vein of left lower extremity (H)     post partum    Anxiety     Hidradenitis suppurativa     Infection due to 2019 novel coronavirus     Nephrolithiasis     Plantar fascial fibromatosis of right foot     Varicella        Social History:   Social History     Tobacco Use    Smoking status: Never     Passive exposure: Never    Smokeless tobacco: Never   Vaping Use    Vaping Use: Never used   Substance Use Topics    Alcohol use:  Yes     Alcohol/week: 1.0 - 2.0 standard drink of alcohol     Types: 1 - 2 Standard drinks or equivalent per week     Comment: 1-2 twice a week    Drug use: Never       Family History:  See HPI    Objective:  Visual Examination via Video:  Pleasant in no acute distress.  Normal work of breathing   A+O x 3    Labs:  CBC RESULTS:   Recent Labs   Lab Test 08/29/23  0830   WBC 8.4   RBC 4.32   HGB 12.2   HCT 36.1   MCV 84   MCH 28.2   MCHC 33.8   RDW 13.7        Last Comprehensive Metabolic Panel:  Sodium   Date Value Ref Range Status   08/25/2023 138 136 - 145 mmol/L Final     Potassium   Date Value Ref Range Status   08/25/2023 4.0 3.5 - 5.0 mmol/L Final     Chloride   Date Value Ref Range Status   08/25/2023 106 98 - 107 mmol/L Final     Carbon Dioxide (CO2)   Date Value Ref Range Status   08/25/2023 21 (L) 22 - 31 mmol/L Final     Anion Gap   Date Value Ref Range Status   08/25/2023 11 5 - 18 mmol/L Final     Glucose   Date Value Ref Range Status   08/25/2023 89 70 - 125 mg/dL Final     Urea Nitrogen   Date Value Ref Range Status   08/25/2023 8 8 - 22 mg/dL Final     Creatinine   Date Value Ref Range Status   08/25/2023 0.65 0.60 - 1.10 mg/dL Final     GFR Estimate   Date Value Ref Range Status   08/25/2023 >90 >60 mL/min/1.73m2 Final     Calcium   Date Value Ref Range Status   08/25/2023 9.2 8.5 - 10.5 mg/dL Final     Liver Function Studies -   Recent Labs   Lab Test 08/14/23  1154   PROTTOTAL 6.9   ALBUMIN 3.7   BILITOTAL 0.2   ALKPHOS 50   AST 13   ALT 12      Latest Reference Range & Units 07/24/23 13:48   INR 0.85 - 1.15  1.03   Thrombin Time 13.0 - 19.0 Seconds 16.5   Antithrombin III Chromogenic 85 - 135 % 113   Prot C Chromogenic 70 - 170 % 111   LUPUS ANTICOAGULANT PANEL  Rpt   Lupus Result Negative  Negative   Protein S Antigen Free 55 - 125 % 91   Lupus Interpretation  The INR is normal.  APTT ratio is normal.    DRVVT Screen ratio is normal.  Thrombin time is normal.  NEGATIVE TEST; A LUPUS  ANTICOAGULANT WAS NOT DETECTED IN THIS SPECIMEN WITHIN THE LIMITS OF THE TESTING REPERTOIRE.  If the clinical picture is strongly suggestive of an antiphospholipid syndrome, recommend anticardiolipin and beta-2-glycoprotein (IgG and IgM) antibody tests.    Renee Washington MD, PhD  [employer]           Rpt: View report in Results Review for more information     Latest Reference Range & Units 07/24/23 13:48   Beta 2 Glycoprotein 1 Antibody IgG <7.0 U/mL 0.9   Beta 2 Glycoprotein 1 Antibody IgM <7.0 U/mL <2.4   Cardiolipin IgG Ann Negative  Weak Positive !   Cardiolipin IgM Ann Negative  Negative   !: Data is abnormal      Imaging:    Recent Results (from the past 744 hour(s))   Abdomen US, limited (RUQ only)    Addendum: 8/14/2023    Addendum:    Possible echogenic lesion in the left hepatic lobe measuring up to 1 cm. Repeat ultrasound in 6 weeks is recommended. Differential includes a cavernous hemangioma.    Findings were relayed to Chantal Bazan MD at 1:30 PM on 08/14/2023.          Narrative    EXAM: US OB <14 WKS COMPLETE W TRANSVAG MULTIPLE, US ABDOMEN LIMITED  LOCATION: [facility]  DATE: 8/14/2023    INDICATION: abdominal pain, first trimester preg  COMPARISON: None.  TECHNIQUE: Transabdominal scans were performed. Endovaginal ultrasound was performed to better visualize the embryo.    FINDINGS:    Pelvis:  UTERUS: Two cystic structures are seen within the endometrium compatible with a twin pregnancy. No fetal poles were seen in either presumed in the gestational sacs. Yolk sacs were identified which appear grossly unremarkable.  PLACENTA: Not yet formed. No evidence for sub-chorionic hemorrhage.    RIGHT OVARY: Normal.  LEFT OVARY: Normal.    Abdomen:  GALLBLADDER: Normal. No gallstones, wall thickening, or pericholecystic fluid. Negative sonographic Lopez's sign.    BILE DUCTS: No biliary dilatation. The common duct measures 3 mm.    LIVER: Normal parenchyma with  smooth contour. No focal mass.    RIGHT KIDNEY: No hydronephrosis.    PANCREAS: The visualized portions are normal.    No ascites.      Impression    IMPRESSION:   Cystic structures within the endometrium compatible with a twin pregnancy. However, no fetal pole was seen on this exam. Repeat ultrasound in one week with serial beta hCG is recommended to document a viable pregnancy.    No findings in the abdomen.    US OB 1St Trimester Multiple w Transvag    Addendum: 8/14/2023    Addendum:    Possible echogenic lesion in the left hepatic lobe measuring up to 1 cm. Repeat ultrasound in 6 weeks is recommended. Differential includes a cavernous hemangioma.    Findings were relayed to Chantal Bazan MD at 1:30 PM on 08/14/2023.          Narrative    EXAM: US OB <14 WKS COMPLETE W TRANSVAG MULTIPLE, US ABDOMEN LIMITED  LOCATION: [facility]  DATE: 8/14/2023    INDICATION: abdominal pain, first trimester preg  COMPARISON: None.  TECHNIQUE: Transabdominal scans were performed. Endovaginal ultrasound was performed to better visualize the embryo.    FINDINGS:    Pelvis:  UTERUS: Two cystic structures are seen within the endometrium compatible with a twin pregnancy. No fetal poles were seen in either presumed in the gestational sacs. Yolk sacs were identified which appear grossly unremarkable.  PLACENTA: Not yet formed. No evidence for sub-chorionic hemorrhage.    RIGHT OVARY: Normal.  LEFT OVARY: Normal.    Abdomen:  GALLBLADDER: Normal. No gallstones, wall thickening, or pericholecystic fluid. Negative sonographic Lopez's sign.    BILE DUCTS: No biliary dilatation. The common duct measures 3 mm.    LIVER: Normal parenchyma with smooth contour. No focal mass.    RIGHT KIDNEY: No hydronephrosis.    PANCREAS: The visualized portions are normal.    No ascites.      Impression    IMPRESSION:   Cystic structures within the endometrium compatible with a twin pregnancy. However, no fetal pole was seen  on this exam. Repeat ultrasound in one week with serial beta hCG is recommended to document a viable pregnancy.    No findings in the abdomen.    US OB Transvaginal Only    Narrative    Obstetrical Ultrasound Report  OB U/S  < 14 Weeks Twin -  Transvaginal   [facility]  Referring Provider: Serena Vela MD   Sonographer: Muna Crockett RDMS  Indication:  Viability check      Dating (mm/dd/yyyy):   LMP: 07/07/23                 EDC:  04/12/24  GA by LMP:         6w3d     Current Scan On:  8/21/2023  Twin 1           EDC:  07/07/24  GA by Current Scan:          6w3d                Twin 2    EDC:N/A              GA by Current Scan:          N/A  The calculation of the gestational age by current scan was based on CRL.  Anatomy Scan:  Dichorionic Diamniotic appearance twin gestation.  Biometry Twin 1:  CRL                       5.4 mm                6w2d                      Yolk Sac               N/V          Biometry Twin 2:  CRL                       N/V          Yolk Sac               N/V                                           Fetal heart activity Twin 1:  Rate and rhythm is within normal limits.    Fetal heart rate: 118 bpm  Fetal heart activity Twin 2:  . N/V  Findings: DEBO = 2.6x 1.2x 1.1cm, GS A with fetal pole and heart beat, GS B   = 1.5cm unable to properly assess due to uterine position GA and habitus     Maternal Structures:  Cervix: The cervix appears long and closed.  Right Ovary: Wnl  Left Ovary: Wnl    Impression:     There appears to be a dichorionic diamniotic twin gestation with a live   IUP measuring 6w3d in one sac and a likely blighted ovum in the second.   The gestation is too early to confirm blighted ovum, but given the smaller   sac size and no visible yolk sac or fetal pole, it is highly suspicious.    Recommend repeat ultrasound in 7-10 days to confirm viability.    Deborah Zurita MD     US OB 1St Trimester Multiple w Transvag     Narrative    EXAM: OBSTETRICAL ULTRASOUND >14 WEEKS, TWINS  LOCATION: [facility]  DATE: 8/25/2023    INDICATION: Twin pregnancy, miscarriage. Worsening clots today  COMPARISON: OB ultrasound 08/21/2023    TECHNIQUE: Routine OB ultrasound.    FINDINGS: Twin diamniotic/dichorionic intrauterine gestation. Twin A in right/center position in the uterus. Twin B in left position in the uterus. Large subchorionic hemorrhage seen to the right of the gestational sacs measuring 6.1 x 4.6 x 4.5 cm.    Uterus measures 9.3 x 7.4 x 9.6 cm. Maternal adnexa (right and left ovaries) show no abnormalities. Right ovary measures 2.4 x 1.8 x 1.7 cm and the left ovary measures 3.3 x 2.4 x 2.1 cm. Cervical Length: Not measured.      TWIN A:  MSD: 2.2 cm, corresponding with 7 week 2 days.  CRL: 0.6 cm, corresponding with 6 weeks 3 days.  FHR: No cardiac activity seen.    TWIN B:  MSD: 2.1 cm, corresponding with 7 week 1 day.  CRL: 0.6 cm, corresponding with 6 weeks 3 days.  FHR: No cardiac activity seen.        Impression    IMPRESSION:    1.  Twin diamniotic / dichorionic gestation. Findings suspicious for but not diagnostic of pregnancy failure for Twins A&B. Both twins measure CRL of 0.6 cm which corresponds with 6 week gestational age, however no cardiac activity is seen with either   twin. Recommend following serial beta-hcg and repeat ultrasound in 7-10 days.  2.  Large subchorionic hemorrhage seen to the right of the gestational sacs measuring 6.1 x 4.6 x 4.5 cm        Assessment/Plan:  In summary, Toma Blanco is a 32 year old female with a history of post partum DVT and superficial vein thrombosis after D&C procedure. With personal and family history of thrombosis, inheritable thrombophilia was pursued and negative for FVL, prothrombin gene mutation, protein C/S/antithrombin deficiencies. Her APS evaluation was notable for weak positive cardiolipin IgG antibody. Clinically significant positive value  is >40. We have plans to repeat APS testing in three months time but at present she does not meet diagnostic criteria for antiphospholipid antibody syndrome.     I counseled Toma that she was prescribed prophylactic Lovenox given her history of estrogen provoked DVT in the post partum period and superficial vein thrombosis after miscarriage to help prevent maternal venous thromboembolism. She clearly has thrombophilic predisposition in setting of estrogen exposure. To date, her testing has not revealed an underlying thrombophilia.     I reviewed the literature that demonstrated prophylactic dose of enoxaparin does not increase the chance of a live birth in women without underlying inheritable thrombophilia with history of unexplained recurrent miscarriage. https://ashpublications.org/blood/article/[email or web address]/Enoxaparin-for-prevention-lu-rbntqakfnlp-ltpyixvso    Her two consecutive miscarriages were complicated by subchorionic hemorrhage. Of note, she was not on anticoagulation during her first episode in May 2023. Per literature review, there is no increased risk of miscarriage among pregnancies impacted by DEBO. However in patients with DEBO who report both vaginal bleeding and cramping, their probability of miscarriage is increased. There are no differences in pregnancy outcomes for patients with DEBO. Lovenox however may increase the risk of bleeding associated with subchorionic hemorrhage. Outcomes of subchorionic hematoma-affected pregnancies in the infertile population. International Journal of Gynecology & Obstetrics Volume 159, Issue 3 p. 743-750    After discussion, Toma will proceed with D&C tomorrow and take prophylactic Eliquis at 2.5mg twice daily x 5 days after procedure to help prevent superficial vein thrombosis/DVT.     For future pregnancies, she will hold off on Lovenox initiation for venous thromboembolism prevention until after viability ultrasound and imaging shows stability of placenta.  She will call with positive pregnancy test.       Video-Visit Details:  Type of service:  Video Visit   Joined the call at 8/29/2023, 10:54:53 am.  Left the call at 8/29/2023, 11:31:21 am.  You were on the call for 36 minutes 27 seconds .   Originating Location (pt. Location): Home  Distant Location (provider location):  [facility]   Mode of Communication:  Video Conference via [facility]      Magdalena Collins, MPH, PA-C  [facility]    40 minutes spent by me on the date of the encounter doing chart review, review of test results, interpretation of tests, patient visit, and documentation

## 2023-08-30 ENCOUNTER — ANESTHESIA (OUTPATIENT)
Dept: SURGERY | Facility: CLINIC | Age: 33
End: 2023-08-30
Payer: COMMERCIAL

## 2023-08-30 ENCOUNTER — HOSPITAL ENCOUNTER (OUTPATIENT)
Facility: CLINIC | Age: 33
Discharge: HOME | End: 2023-08-30
Attending: OBSTETRICS & GYNECOLOGY | Admitting: OBSTETRICS & GYNECOLOGY
Payer: COMMERCIAL

## 2023-08-30 VITALS
RESPIRATION RATE: 16 BRPM | HEART RATE: 74 BPM | TEMPERATURE: 98.1 F | WEIGHT: 244.93 LBS | BODY MASS INDEX: 35.07 KG/M2 | HEIGHT: 70 IN | DIASTOLIC BLOOD PRESSURE: 63 MMHG | SYSTOLIC BLOOD PRESSURE: 107 MMHG | OXYGEN SATURATION: 100 %

## 2023-08-30 PROCEDURE — 360N000075 HC SURGERY LEVEL 2, PER MIN: Performed by: OBSTETRICS & GYNECOLOGY

## 2023-08-30 PROCEDURE — 250N000025 HC SEVOFLURANE, PER MIN: Performed by: OBSTETRICS & GYNECOLOGY

## 2023-08-30 PROCEDURE — 370N000017 HC ANESTHESIA TECHNICAL FEE, PER MIN: Performed by: OBSTETRICS & GYNECOLOGY

## 2023-08-30 PROCEDURE — 250N000013 HC RX MED GY IP 250 OP 250 PS 637

## 2023-08-30 PROCEDURE — 999N000141 HC STATISTIC PRE-PROCEDURE NURSING ASSESSMENT: Performed by: OBSTETRICS & GYNECOLOGY

## 2023-08-30 PROCEDURE — 250N000009 HC RX 250: Performed by: OBSTETRICS & GYNECOLOGY

## 2023-08-30 PROCEDURE — 76998 US GUIDE INTRAOP: CPT | Mod: 26 | Performed by: OBSTETRICS & GYNECOLOGY

## 2023-08-30 PROCEDURE — 250N000011 HC RX IP 250 OP 636

## 2023-08-30 PROCEDURE — 250N000011 HC RX IP 250 OP 636: Mod: JZ | Performed by: OBSTETRICS & GYNECOLOGY

## 2023-08-30 PROCEDURE — 250N000009 HC RX 250

## 2023-08-30 PROCEDURE — 59820 CARE OF MISCARRIAGE: CPT | Mod: 78 | Performed by: OBSTETRICS & GYNECOLOGY

## 2023-08-30 PROCEDURE — 272N000001 HC OR GENERAL SUPPLY STERILE: Performed by: OBSTETRICS & GYNECOLOGY

## 2023-08-30 PROCEDURE — 88305 TISSUE EXAM BY PATHOLOGIST: CPT | Mod: TC | Performed by: OBSTETRICS & GYNECOLOGY

## 2023-08-30 PROCEDURE — 258N000003 HC RX IP 258 OP 636

## 2023-08-30 PROCEDURE — 710N000012 HC RECOVERY PHASE 2, PER MINUTE: Performed by: OBSTETRICS & GYNECOLOGY

## 2023-08-30 PROCEDURE — 710N000010 HC RECOVERY PHASE 1, LEVEL 2, PER MIN: Performed by: OBSTETRICS & GYNECOLOGY

## 2023-08-30 PROCEDURE — 250N000011 HC RX IP 250 OP 636: Mod: JZ

## 2023-08-30 PROCEDURE — 88305 TISSUE EXAM BY PATHOLOGIST: CPT | Mod: 26 | Performed by: STUDENT IN AN ORGANIZED HEALTH CARE EDUCATION/TRAINING PROGRAM

## 2023-08-30 RX ORDER — ONDANSETRON 2 MG/ML
4 INJECTION INTRAMUSCULAR; INTRAVENOUS EVERY 30 MIN PRN
Status: DISCONTINUED | OUTPATIENT
Start: 2023-08-30 | End: 2023-08-30 | Stop reason: HOSPADM

## 2023-08-30 RX ORDER — SODIUM CHLORIDE, SODIUM LACTATE, POTASSIUM CHLORIDE, CALCIUM CHLORIDE 600; 310; 30; 20 MG/100ML; MG/100ML; MG/100ML; MG/100ML
INJECTION, SOLUTION INTRAVENOUS CONTINUOUS
Status: DISCONTINUED | OUTPATIENT
Start: 2023-08-30 | End: 2023-08-30 | Stop reason: HOSPADM

## 2023-08-30 RX ORDER — DOXYCYCLINE 100 MG/10ML
INJECTION, POWDER, LYOPHILIZED, FOR SOLUTION INTRAVENOUS PRN
Status: DISCONTINUED | OUTPATIENT
Start: 2023-08-30 | End: 2023-08-30

## 2023-08-30 RX ORDER — OXYCODONE HYDROCHLORIDE 5 MG/1
5 TABLET ORAL
Status: DISCONTINUED | OUTPATIENT
Start: 2023-08-30 | End: 2023-08-30 | Stop reason: HOSPADM

## 2023-08-30 RX ORDER — LIDOCAINE 40 MG/G
CREAM TOPICAL
Status: DISCONTINUED | OUTPATIENT
Start: 2023-08-30 | End: 2023-08-30 | Stop reason: HOSPADM

## 2023-08-30 RX ORDER — PROPOFOL 10 MG/ML
INJECTION, EMULSION INTRAVENOUS PRN
Status: DISCONTINUED | OUTPATIENT
Start: 2023-08-30 | End: 2023-08-30

## 2023-08-30 RX ORDER — ONDANSETRON 2 MG/ML
INJECTION INTRAMUSCULAR; INTRAVENOUS PRN
Status: DISCONTINUED | OUTPATIENT
Start: 2023-08-30 | End: 2023-08-30

## 2023-08-30 RX ORDER — KETOROLAC TROMETHAMINE 30 MG/ML
INJECTION, SOLUTION INTRAMUSCULAR; INTRAVENOUS PRN
Status: DISCONTINUED | OUTPATIENT
Start: 2023-08-30 | End: 2023-08-30

## 2023-08-30 RX ORDER — FENTANYL CITRATE 50 UG/ML
INJECTION, SOLUTION INTRAMUSCULAR; INTRAVENOUS PRN
Status: DISCONTINUED | OUTPATIENT
Start: 2023-08-30 | End: 2023-08-30

## 2023-08-30 RX ORDER — ACETAMINOPHEN 325 MG/1
975 TABLET ORAL ONCE
Status: DISCONTINUED | OUTPATIENT
Start: 2023-08-30 | End: 2023-08-30 | Stop reason: HOSPADM

## 2023-08-30 RX ORDER — ACETAMINOPHEN 325 MG/1
975 TABLET ORAL ONCE
Status: COMPLETED | OUTPATIENT
Start: 2023-08-30 | End: 2023-08-30

## 2023-08-30 RX ORDER — ONDANSETRON 4 MG/1
4 TABLET, ORALLY DISINTEGRATING ORAL EVERY 30 MIN PRN
Status: DISCONTINUED | OUTPATIENT
Start: 2023-08-30 | End: 2023-08-30 | Stop reason: HOSPADM

## 2023-08-30 RX ORDER — TRANEXAMIC ACID 10 MG/ML
1 INJECTION, SOLUTION INTRAVENOUS ONCE
Status: COMPLETED | OUTPATIENT
Start: 2023-08-30 | End: 2023-08-30

## 2023-08-30 RX ORDER — LABETALOL HYDROCHLORIDE 5 MG/ML
10 INJECTION, SOLUTION INTRAVENOUS
Status: DISCONTINUED | OUTPATIENT
Start: 2023-08-30 | End: 2023-08-30 | Stop reason: HOSPADM

## 2023-08-30 RX ORDER — HYDROMORPHONE HYDROCHLORIDE 1 MG/ML
0.4 INJECTION, SOLUTION INTRAMUSCULAR; INTRAVENOUS; SUBCUTANEOUS EVERY 5 MIN PRN
Status: DISCONTINUED | OUTPATIENT
Start: 2023-08-30 | End: 2023-08-30 | Stop reason: HOSPADM

## 2023-08-30 RX ORDER — HYDROMORPHONE HYDROCHLORIDE 1 MG/ML
0.2 INJECTION, SOLUTION INTRAMUSCULAR; INTRAVENOUS; SUBCUTANEOUS EVERY 5 MIN PRN
Status: DISCONTINUED | OUTPATIENT
Start: 2023-08-30 | End: 2023-08-30 | Stop reason: HOSPADM

## 2023-08-30 RX ORDER — LIDOCAINE HYDROCHLORIDE 10 MG/ML
INJECTION, SOLUTION INFILTRATION; PERINEURAL PRN
Status: DISCONTINUED | OUTPATIENT
Start: 2023-08-30 | End: 2023-08-30 | Stop reason: HOSPADM

## 2023-08-30 RX ORDER — DEXAMETHASONE SODIUM PHOSPHATE 4 MG/ML
INJECTION, SOLUTION INTRA-ARTICULAR; INTRALESIONAL; INTRAMUSCULAR; INTRAVENOUS; SOFT TISSUE PRN
Status: DISCONTINUED | OUTPATIENT
Start: 2023-08-30 | End: 2023-08-30

## 2023-08-30 RX ORDER — LIDOCAINE HYDROCHLORIDE 20 MG/ML
INJECTION, SOLUTION INFILTRATION; PERINEURAL PRN
Status: DISCONTINUED | OUTPATIENT
Start: 2023-08-30 | End: 2023-08-30

## 2023-08-30 RX ORDER — DOXYCYCLINE 100 MG/10ML
100 INJECTION, POWDER, LYOPHILIZED, FOR SOLUTION INTRAVENOUS
Status: COMPLETED | OUTPATIENT
Start: 2023-08-30 | End: 2023-08-30

## 2023-08-30 RX ADMIN — DOXYCYCLINE 100 MG: 100 INJECTION, POWDER, LYOPHILIZED, FOR SOLUTION INTRAVENOUS at 08:44

## 2023-08-30 RX ADMIN — LIDOCAINE HYDROCHLORIDE 100 MG: 20 INJECTION, SOLUTION INFILTRATION; PERINEURAL at 09:00

## 2023-08-30 RX ADMIN — KETOROLAC TROMETHAMINE 15 MG: 30 INJECTION, SOLUTION INTRAMUSCULAR at 09:34

## 2023-08-30 RX ADMIN — DOXYCYCLINE 100 MG: 100 INJECTION, POWDER, LYOPHILIZED, FOR SOLUTION INTRAVENOUS at 07:52

## 2023-08-30 RX ADMIN — ACETAMINOPHEN 975 MG: 325 TABLET, FILM COATED ORAL at 07:11

## 2023-08-30 RX ADMIN — PROPOFOL 320 MG: 10 INJECTION, EMULSION INTRAVENOUS at 09:00

## 2023-08-30 RX ADMIN — MIDAZOLAM 2 MG: 1 INJECTION INTRAMUSCULAR; INTRAVENOUS at 08:45

## 2023-08-30 RX ADMIN — SODIUM CHLORIDE, POTASSIUM CHLORIDE, SODIUM LACTATE AND CALCIUM CHLORIDE: 600; 310; 30; 20 INJECTION, SOLUTION INTRAVENOUS at 08:52

## 2023-08-30 RX ADMIN — TRANEXAMIC ACID 1 G: 10 INJECTION, SOLUTION INTRAVENOUS at 09:25

## 2023-08-30 RX ADMIN — FENTANYL CITRATE 50 MCG: 50 INJECTION, SOLUTION INTRAMUSCULAR; INTRAVENOUS at 10:01

## 2023-08-30 RX ADMIN — DEXAMETHASONE SODIUM PHOSPHATE 4 MG: 4 INJECTION, SOLUTION INTRA-ARTICULAR; INTRALESIONAL; INTRAMUSCULAR; INTRAVENOUS; SOFT TISSUE at 09:20

## 2023-08-30 RX ADMIN — ONDANSETRON 4 MG: 2 INJECTION INTRAMUSCULAR; INTRAVENOUS at 09:20

## 2023-08-30 ASSESSMENT — ACTIVITIES OF DAILY LIVING (ADL)
ADLS_ACUITY_SCORE: 35

## 2023-08-30 NOTE — ANESTHESIA PREPROCEDURE EVALUATION
Anesthesia Pre-Procedure Evaluation    Patient: Toma Blanco   MRN: [id number] : 1990        Procedure : Procedure(s):  DILATION AND CURETTAGE, UTERUS, USING SUCTION          Past Medical History:   Diagnosis Date    Acute deep vein thrombosis (DVT) of proximal vein of left lower extremity (H)     post partum    Anxiety     Hidradenitis suppurativa     Infection due to 2019 novel coronavirus     Nephrolithiasis     Plantar fascial fibromatosis of right foot     Varicella       Past Surgical History:   Procedure Laterality Date    DILATION AND CURETTAGE SUCTION N/A 2023    Procedure: DILATION AND CURETTAGE, UTERUS, USING SUCTION;  Surgeon: Lulu Leone MD;  Location: [facility] OR    EXCISE MASS FOOT Right 2023    Procedure: Excision and Neuroplasty of Right Plantar Foot Mass;  Surgeon: Hunter Redding MD;  Location: [facility] OR    [place] teeth extraction        No Known Allergies   Social History     Tobacco Use    Smoking status: Never     Passive exposure: Never    Smokeless tobacco: Never   Substance Use Topics    Alcohol use: Yes     Alcohol/week: 1.0 - 2.0 standard drink of alcohol     Types: 1 - 2 Standard drinks or equivalent per week     Comment: 1-2 twice a week      Wt Readings from Last 1 Encounters:   23 111.1 kg (244 lb 14.9 oz)        Anesthesia Evaluation            ROS/MED HX  ENT/Pulmonary:     (+)     DIMAS risk factors,   obese,                               Neurologic:  - neg neurologic ROS     Cardiovascular:  - neg cardiovascular ROS     METS/Exercise Tolerance:     Hematologic:     (+) History of blood clots (DVT post partum),    pt is anticoagulated,           Musculoskeletal: Comment: Foot pain      GI/Hepatic:  - neg GI/hepatic ROS     Renal/Genitourinary: Comment: kidney stone with ED visit 22. Resolved, assumed passed  (+) Nephrolithiasis ,      Endo:  - neg endo ROS     Psychiatric/Substance Use:     (+) psychiatric history anxiety       Infectious Disease:   - neg infectious disease ROS     Malignancy:  - neg malignancy ROS     Other: Comment: Hidradenitis Suppurativa - neg other ROS            Physical Exam    Airway        Mallampati: II   TM distance: > 3 FB   Neck ROM: full   Mouth opening: > 3 cm    Respiratory Devices and Support         Dental       (+) Completely normal teeth      Cardiovascular   cardiovascular exam normal          Pulmonary   pulmonary exam normal                OUTSIDE LABS:  CBC:   Lab Results   Component Value Date    WBC 8.4 08/29/2023    WBC 9.3 08/25/2023    HGB 12.2 08/29/2023    HGB 11.8 08/25/2023    HCT 36.1 08/29/2023    HCT 35.2 08/25/2023     08/29/2023     08/25/2023     BMP:   Lab Results   Component Value Date     08/25/2023     08/14/2023    POTASSIUM 4.0 08/25/2023    POTASSIUM 3.8 08/14/2023    CHLORIDE 106 08/25/2023    CHLORIDE 108 (H) 08/14/2023    CO2 21 (L) 08/25/2023    CO2 21 (L) 08/14/2023    BUN 8 08/25/2023    BUN 9 08/14/2023    CR 0.65 08/25/2023    CR 0.65 08/14/2023    GLC 89 08/25/2023    GLC 90 08/14/2023     COAGS:   Lab Results   Component Value Date    INR 1.03 07/24/2023     POC: No results found for: BGM, HCG, HCGS  HEPATIC:   Lab Results   Component Value Date    ALBUMIN 3.7 08/14/2023    PROTTOTAL 6.9 08/14/2023    ALT 12 08/14/2023    AST 13 08/14/2023    ALKPHOS 50 08/14/2023    BILITOTAL 0.2 08/14/2023     OTHER:   Lab Results   Component Value Date    TAURUS 9.2 08/25/2023    MAG 1.9 04/08/2023    LIPASE 29 08/14/2023    TSH 1.29 07/24/2023    T4 1.30 08/29/2023    T3 134 08/29/2023    SED 27 (H) 12/06/2014       Anesthesia Plan    ASA Status:  3       Anesthesia Type: General.     - Airway: LMA              Consents    Anesthesia Plan(s) and associated risks, benefits, and realistic alternatives discussed. Questions answered and patient/representative(s) expressed understanding.     - Discussed: Risks, Benefits and Alternatives for BOTH SEDATION and the PROCEDURE were  discussed     - Discussed with:  Patient       Use of blood products discussed: No .     Postoperative Care    Pain management: IV analgesics, Multi-modal analgesia.   PONV prophylaxis: Ondansetron (or other 5HT-3)     Comments:                Anibal Kim MD

## 2023-08-30 NOTE — DISCHARGE INSTRUCTIONS
Same-Day Surgery   Adult Discharge Orders & Instructions     For 24 hours after surgery:  Get plenty of rest.  A responsible adult must stay with you for at least 24 hours after you leave the hospital.   Pain medication can slow your reflexes. Do not drive or use heavy equipment.  If you have weakness or tingling, don't drive or use heavy equipment until this feeling goes away.  Mixing alcohol and pain medication can cause dizziness and slow your breathing. It can even be fatal. Do not drink alcohol while taking pain medication.  Avoid strenuous or risky activities.  Ask for help when climbing stairs.   You may feel lightheaded.  If so, sit for a few minutes before standing.  Have someone help you get up.   If you have nausea (feel sick to your stomach), drink only clear liquids such as apple juice, ginger ale, broth or 7-Up.  Rest may also help.  Be sure to drink enough fluids.  Move to a regular diet as you feel able. Take pain medications with a small amount of solid food, such as toast or crackers, to avoid nausea.   A slight fever is normal. Call the doctor if your fever is over 100 F (37.7 C) (taken under the tongue) or lasts longer than 24 hours.  You may have a dry mouth, muscle aches, trouble sleeping or a sore throat.  These symptoms should go away after 24 hours.  Do not make important or legal decisions.   Pain Management:      1. Take pain medication (if prescribed) for pain as directed by your physician.        2. WARNING: If the pain medication you have been prescribed contains Tylenol  (acetaminophen), DO NOT take additional doses of Tylenol (acetaminophen).     Call your doctor for any of the followin.  Signs of infection (fever, growing tenderness at the surgery site, severe pain, a large amount of drainage or bleeding, foul-smelling drainage, redness, swelling).    2.  It has been over 8 to 10 hours since surgery and you are still not able to urinate (pee).    3.  Headache for over 24  hours.    4.  Numbness, tingling or weakness the day after surgery (if you had spinal anesthesia).  To contact a doctor, call __Dr. Deborah Zurita at [facility] [phone] ____ or:  '   [phone] and ask for the Resident On Call for:          __JULIANA_____ (answered 24 hours a day)  '   Emergency Department:  [facility]: [phone]  [facility]: [phone]               Discharge Instructions: Following a Dilation   and Curettage/Dilation and Evacuation    What to expect:  Expect small to moderate amount of vaginal bleeding which should taper off in 4-5 days. It should not be heavier than your regular menstrual flow.  Do not douche, and use a pad rather than tampons.   No intercourse until bleeding has ceased.    Activity:  Rest the day of surgery. You may resume normal activity the next day.  You may bathe or shower.  Avoid heavy lifting (10-15 lbs) for one week.    Comfort:  The amount of discomfort you can expect is very unpredictable. If you have pain that cannot be controlled with non-aspirin pain relievers or with the prescription you may have received, you should notify your doctor.  Abdominal cramping (like menstrual cramps) or low back ache are common and should not be a cause for concern. You will be drowsy and weak the day of surgery and possibly the following day.  You can take more Ibuprofen at 3:30pm    Diet:  You have no restrictions on your diet. Following surgery, drink plenty of fluids and eat a light meal.    Nausea:  The anesthesia medications you received during your surgical procedure may produce some nausea.  If you feel nauseated, stay in bed, keep your head down and try drinking fluids such as Seven-Up, tea or soup.    Notify Physician at once if you experience:  A fever over 100.4 degrees (a low grade fever under 100 degrees is usual after surgery).  Heavy flow and/or passing large clots. Saturating more than 1 pad per  hour for 2 or more hours.   Severe pain or cramps.    Important numbers    [facility] ([address]) : [phone]

## 2023-08-30 NOTE — ANESTHESIA CARE TRANSFER NOTE
Patient: Toma Blanco    Procedure: Procedure(s):  DILATION AND CURETTAGE, UTERUS, USING SUCTION       Diagnosis: Missed  [O02.1]  Diagnosis Additional Information: No value filed.    Anesthesia Type:   No value filed.     Note:    Oropharynx: oropharynx clear of all foreign objects and spontaneously breathing  Level of Consciousness: awake  Oxygen Supplementation: face mask    Independent Airway: airway patency satisfactory and stable  Dentition: dentition unchanged  Vital Signs Stable: post-procedure vital signs reviewed and stable  Report to RN Given: handoff report given  Patient transferred to: PACU    Handoff Report: Identifed the Patient, Identified the Reponsible Provider, Reviewed the pertinent medical history, Discussed the surgical course, Reviewed Intra-OP anesthesia mangement and issues during anesthesia, Set expectations for post-procedure period and Allowed opportunity for questions and acknowledgement of understanding      Vitals:  Vitals Value Taken Time   /77 23 0948   Temp 36.6  C (97.9  F) 23 0948   Pulse 58 23 0957   Resp 17 23 0957   SpO2 97 % 23 0957   Vitals shown include unvalidated device data.    Electronically Signed By: Anibal Kim MD  2023  9:57 AM

## 2023-08-30 NOTE — INTERVAL H&P NOTE
"I have reviewed the surgical (or preoperative) H&P that is linked to this encounter, and examined the patient. There are no significant changes    Clinical Conditions Present on Arrival:  Clinically Significant Risk Factors Present on Admission                # Drug Induced Coagulation Defect: home medication list includes an anticoagulant medication   # Obesity: Estimated body mass index is 35.14 kg/m  as calculated from the following:    Height as of this encounter: 1.778 m (5' 10\").    Weight as of this encounter: 111.1 kg (244 lb 14.9 oz).       "

## 2023-08-30 NOTE — PROCEDURES
[facility]   Operative Note    Date of service: 2023    Preoperative diagnosis:  missed  of Di/Di twins at 7week  Postoperative diagnosis:  Same with pathology pending    Procedure:  Suction D&C with ultrasound guidance    Surgeon:  Deborah Zurita MD    Assistant:   none    Anesthesia:  MAC  EBL:  100mL  IVF: 600cc LR    Specimens: products of conception    Complications: none apparent    Findings:    Exam under anesthesia revealed normal cervix, midline uterus, no adnexal masses.   Speculum exam revealed normal cervix with no lesions.   Large amount of dark clot removed from uterus.  Uterine cavity with slight arcuate shape  Moderate amount of products of conception removed with thin endometrial stripe on ultrasound following suction      Indications:  Toma Blanco is a 32 year old  who was found to have a missed Ab of di/di twins at approximately 7weeks gestation.  She has been having ongoing and at times heavy bleeding over the past 2 weeks.  Recent ultrasound showed that both gestational sac remained in the uterus. A suction D&C was recommended. Risks, benefits, and alternatives to the procedure were discussed with the patient who elected to proceed.  All questions were answered and an informed consent was obtained.    Procedure:  The patient was taken to the operating room where she underwent MAC anesthesia without difficulty.  She was placed in a dorsal lithotomy position using yellow-fin stirrups.  The patient was examined for the above noted findings and then prepped and draped in the usual sterile fashion.  A medium graves speculum was inserted into the vagina.A tenaculum was placed on the anterior cervical lip.  A paracervical block was administered with an additional 20cc 1% plain lidocaine at the 4 and 8 o'clock positions. The endocervical canal was easily dilated to a #9 devante dilator.  An 8mm curved suction curette was introduced to the fundus and suction initiated.  The first  several passes only revealed old clot and then tissue was noted.  A pass was made without obvious tissue or clot and suction canula removed and brisk bleeding from the cervical os began.  A gentle sharp curettage was performed and no obvious tissue felt, but uterus felt soft.  A bedside ultrasound was then performed showing what appeared to be a slight arcuate shape with tissue in the right corner.  Under ultrasound guidance, the suction curette was re-introduced and the remaining tissue removed.  A sharp curettage was done with a gritty texture noted.  A final pass was made with no further tissue obtained.  On ultrasound, the endometrial stripe was noted to be thin without evidence of retained tissue and bleeding was minimal. The tenaculum was removed and sites were hemostatic. The speculum was removed. The patient tolerated the procedure well and was taken to the recovery room in stable condition.      KITTY REYES MD

## 2023-08-30 NOTE — ANESTHESIA POSTPROCEDURE EVALUATION
Patient: Toma Blanco    Procedure: Procedure(s):  DILATION AND CURETTAGE, UTERUS, USING SUCTION       Anesthesia Type:  No value filed.    Note:  Disposition: Outpatient   Postop Pain Control: Uneventful            Sign Out: Well controlled pain   PONV: No   Neuro/Psych: Uneventful            Sign Out: Acceptable/Baseline neuro status   Airway/Respiratory: Uneventful            Sign Out: Acceptable/Baseline resp. status   CV/Hemodynamics: Uneventful            Sign Out: Acceptable CV status; No obvious hypovolemia; No obvious fluid overload   Other NRE:    DID A NON-ROUTINE EVENT OCCUR?            Last vitals:  Vitals Value Taken Time   /56 08/30/23 1030   Temp 36.7  C (98.1  F) 08/30/23 1030   Pulse 55 08/30/23 1033   Resp 26 08/30/23 1034   SpO2 100 % 08/30/23 1037   Vitals shown include unvalidated device data.    Electronically Signed By: Jann Holbrook MD  August 30, 2023  11:49 AM

## 2023-08-30 NOTE — ANESTHESIA PROCEDURE NOTES
Airway         Procedure Start/Stop Times: 8/30/2023 9:01 AM  Staff -        Anesthesiologist:  Nba Pineda MD       Resident/Fellow: Anibal Kim MD       Performed By: resident  Consent for Airway        Urgency: elective  Indications and Patient Condition         Mask difficulty assessment: 0 - not attempted    Final Airway Details       Final airway type: supraglottic airway    Supraglottic Airway Details        Type: LMA       Brand: I-Gel       LMA size: 4    Post intubation assessment        Placement verified by: capnometry, equal breath sounds and chest rise        Number of attempts at approach: 1       Secured with: plastic tape       Ease of procedure: easy       Dentition: Intact    Medication(s) Administered   Medication Administration Time: 8/30/2023 9:01 AM

## 2023-08-31 ENCOUNTER — HOSPITAL ENCOUNTER (OUTPATIENT)
Dept: ULTRASOUND IMAGING | Facility: CLINIC | Age: 33
Discharge: HOME | End: 2023-08-31
Attending: PHYSICIAN ASSISTANT | Admitting: PHYSICIAN ASSISTANT
Payer: COMMERCIAL

## 2023-08-31 DIAGNOSIS — Z86.718 PERSONAL HISTORY OF DVT (DEEP VEIN THROMBOSIS): Primary | ICD-10-CM

## 2023-08-31 DIAGNOSIS — Z86.718 PERSONAL HISTORY OF DVT (DEEP VEIN THROMBOSIS): ICD-10-CM

## 2023-08-31 PROCEDURE — 93971 EXTREMITY STUDY: CPT | Mod: LT

## 2023-08-31 NOTE — PROGRESS NOTES
[id number]  Toma Blanco  32 year old female  CBCD Diagnosis: Hx of DVT  CBCD Provider: Magdalena Martin PA-C    Call received from Toma with concerns about a new superficial clot post IV placement in left arm yesterday for her D&C.    Toma states that Magdalena started her on Eliquis 2.5mg BID and she took her first dose yesterday. Today is is noting a red, swollen, tender area on her forearm close to where her IV was placed.    Reviewed with Magdalena Martin PA-C who agrees that this sounds like an SVT. She advises a STAT ultrasound today to confirm and to increase her Eliquis to 5mg BID. Ultrasound scheduled for 140pm at [facility].    Staff will follow up with Toma regarding the results.    Jolly HOPPERN, RN, PHN   [facility]  [facility]   Office: [phone]  Fax: [phone]

## 2023-09-01 ENCOUNTER — MYC MEDICAL ADVICE (OUTPATIENT)
Dept: HEMATOLOGY | Facility: CLINIC | Age: 33
End: 2023-09-01

## 2023-09-01 DIAGNOSIS — Z86.718 PERSONAL HISTORY OF DVT (DEEP VEIN THROMBOSIS): Primary | ICD-10-CM

## 2023-09-01 DIAGNOSIS — I82.890 SUPERFICIAL VEIN THROMBOSIS: ICD-10-CM

## 2023-09-15 ENCOUNTER — MYC MEDICAL ADVICE (OUTPATIENT)
Dept: OBGYN | Facility: CLINIC | Age: 33
End: 2023-09-15

## 2023-09-18 PROBLEM — Z23 NEED FOR TDAP VACCINATION: Status: RESOLVED | Noted: 2023-05-16 | Resolved: 2023-09-18

## 2023-09-21 ASSESSMENT — ANXIETY QUESTIONNAIRES
GAD7 TOTAL SCORE: 4
2. NOT BEING ABLE TO STOP OR CONTROL WORRYING: SEVERAL DAYS
6. BECOMING EASILY ANNOYED OR IRRITABLE: SEVERAL DAYS
5. BEING SO RESTLESS THAT IT IS HARD TO SIT STILL: NOT AT ALL
7. FEELING AFRAID AS IF SOMETHING AWFUL MIGHT HAPPEN: NOT AT ALL
3. WORRYING TOO MUCH ABOUT DIFFERENT THINGS: SEVERAL DAYS
IF YOU CHECKED OFF ANY PROBLEMS ON THIS QUESTIONNAIRE, HOW DIFFICULT HAVE THESE PROBLEMS MADE IT FOR YOU TO DO YOUR WORK, TAKE CARE OF THINGS AT HOME, OR GET ALONG WITH OTHER PEOPLE: NOT DIFFICULT AT ALL
GAD7 TOTAL SCORE: 4
4. TROUBLE RELAXING: NOT AT ALL
1. FEELING NERVOUS, ANXIOUS, OR ON EDGE: SEVERAL DAYS

## 2023-09-22 ENCOUNTER — OFFICE VISIT (OUTPATIENT)
Dept: FAMILY MEDICINE | Facility: CLINIC | Age: 33
End: 2023-09-22
Payer: COMMERCIAL

## 2023-09-22 VITALS
DIASTOLIC BLOOD PRESSURE: 78 MMHG | HEART RATE: 88 BPM | TEMPERATURE: 98.1 F | SYSTOLIC BLOOD PRESSURE: 126 MMHG | HEIGHT: 69 IN | BODY MASS INDEX: 36.24 KG/M2 | RESPIRATION RATE: 20 BRPM | WEIGHT: 244.71 LBS | OXYGEN SATURATION: 99 %

## 2023-09-22 DIAGNOSIS — Z86.718 HISTORY OF DEEP VENOUS THROMBOSIS: ICD-10-CM

## 2023-09-22 DIAGNOSIS — R42 DIZZINESS: ICD-10-CM

## 2023-09-22 DIAGNOSIS — K76.9 LIVER LESION: ICD-10-CM

## 2023-09-22 DIAGNOSIS — Z87.59 HISTORY OF MISCARRIAGE: ICD-10-CM

## 2023-09-22 DIAGNOSIS — R00.2 HEART PALPITATIONS: ICD-10-CM

## 2023-09-22 DIAGNOSIS — L71.0 PERIORAL DERMATITIS: ICD-10-CM

## 2023-09-22 DIAGNOSIS — E04.1 THYROID NODULE: ICD-10-CM

## 2023-09-22 DIAGNOSIS — Z76.89 ESTABLISHING CARE WITH NEW DOCTOR, ENCOUNTER FOR: Primary | ICD-10-CM

## 2023-09-22 DIAGNOSIS — O35.FXX0 OMPHALOCELE OF FETUS IN SINGLETON PREGNANCY, ANTEPARTUM: ICD-10-CM

## 2023-09-22 PROBLEM — L73.2 HIDRADENITIS SUPPURATIVA: Status: ACTIVE | Noted: 2019-10-27

## 2023-09-22 PROBLEM — O35.09X0 PREGNANCY COMPLICATED BY FETAL CEREBRAL VENTRICULOMEGALY: Status: RESOLVED | Noted: 2021-03-08 | Resolved: 2023-09-22

## 2023-09-22 PROBLEM — G43.109 MIGRAINE WITH AURA AND WITHOUT STATUS MIGRAINOSUS, NOT INTRACTABLE: Status: ACTIVE | Noted: 2023-09-22

## 2023-09-22 LAB
MAGNESIUM SERPL-MCNC: 2.2 MG/DL (ref 1.7–2.3)
PHOSPHATE SERPL-MCNC: 3.2 MG/DL (ref 2.5–4.5)
VIT B12 SERPL-MCNC: 333 PG/ML (ref 232–1245)

## 2023-09-22 PROCEDURE — 99204 OFFICE O/P NEW MOD 45 MIN: CPT | Performed by: FAMILY MEDICINE

## 2023-09-22 PROCEDURE — 83735 ASSAY OF MAGNESIUM: CPT | Performed by: FAMILY MEDICINE

## 2023-09-22 PROCEDURE — 82306 VITAMIN D 25 HYDROXY: CPT | Performed by: FAMILY MEDICINE

## 2023-09-22 PROCEDURE — 84100 ASSAY OF PHOSPHORUS: CPT | Performed by: FAMILY MEDICINE

## 2023-09-22 PROCEDURE — 82607 VITAMIN B-12: CPT | Performed by: FAMILY MEDICINE

## 2023-09-22 PROCEDURE — 36415 COLL VENOUS BLD VENIPUNCTURE: CPT | Performed by: FAMILY MEDICINE

## 2023-09-22 ASSESSMENT — PAIN SCALES - GENERAL: PAINLEVEL: NO PAIN (0)

## 2023-09-22 NOTE — PROGRESS NOTES
"Assessment/Plan:    Establishing care with new doctor, encounter for  Establishing care today, all past medical history reviewed and updated in EMR.     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   - [facility] Referral - Preconception    Dizziness  Heart palpitations  Pt describes symptoms as below, will check labs today.   - Vitamin B12  - Magnesium  - Vitamin D deficiency screening  - Phosphorus    Perioral dermatitis  Discussed management tips of likely perioral dermatitis.     Liver lesion  Abdominal US from 8/14/23 mentioned \"possible echogenic lesion in L lobe measuring up to 1cm. Repeat US in 6 weeks is recommended\". US ordered today.  - US Abdomen Limited    Thyroid nodule  Thyroid US done on 7/26/23 showed multiple small nodules, none requiring FNA and technically no follow-up imaging recommended.      Follow up: as needed    Alesia Harris MD  [facility]    Subjective:   Toma Blanco is a 32 year old female is here today for establish care    -medically complex kid - goes to /[facility] so transferring care into the system as well  -hx LLE DVT - postpartum 6 wks, considered provoked d/t postpartum state - fam hx blood clots/fatal clots  -premature complicated birth with anomalies - Hx delivery at 33 wks (PPROM, omphalocele, IUGR, covid, obesity)  -2 miscarriages in last 4 months and 2 superficial clots after D&Cs - Aug D&C missed miscarriage twins, June D&C incomplete miscarriage - reports having large subchorionic hemorrhages which led to miscarriages  -had progesterone during first pregnancy  -has seen hematology at  - possible inherited thrombophilia - rec lovenox with pregnancy, plan repeat labs (hx weakly " positive cardiolipin)  -planning Adefris and Toppin   -natural family planning  -hidradenitis - worsening - wondering about zinc? Keflex? - hx derm at [facility] 5 yrs ago  -hoping for pregnancy soon  -migraines - hx with aura - frontal/eye, seems to be stressed related and worse with pregnancy - often resolves with meds and rest - has had some dizziness  -heart palpitations - thinks was from low B12 in the past - not super frequent - hx ECHO in 2015 (nml then) - otherwise plans to monitor more   -perioral dermatitis  -thyroid nodules - endo in Jan    Answers submitted by the patient for this visit:  JIMBO-7 (Submitted on 9/21/2023)  JIMBO 7 TOTAL SCORE: 4  Migraine Visit Questionnaire (Submitted on 9/21/2023)  Chief Complaint: Chronic problems general questions HPI Form  Headache Symptoms are: worsened  How often are you getting headaches or migraines? : 3x week  Are you able to do normal daily activities when you have a migraine?: Yes  Migraine Rescue/Relief Medications:: Ibuprofen (Advil, Motrin)  Effectiveness of rescue/relief medications:: I get total relief  Migraine Preventative Medications:: no medications to prevent migraines  ER or UC Visits:: 0 times  Depression / Anxiety Questionnaire (Submitted on 9/21/2023)  Chief Complaint: Chronic problems general questions HPI Form  Depression/Anxiety: Anxiety  Anxiety only (Submitted on 9/21/2023)  Chief Complaint: Chronic problems general questions HPI Form  Anxiety since last: : no change  Other associated symotome: : Yes  Significant life event: : health concerns  Anxious:: Yes  Current substance use:: No  General Questionnaire (Submitted on 9/21/2023)  Chief Complaint: Chronic problems general questions HPI Form  What is the reason for your visit today? : Establish primary care abd follow-up on general health.  How many servings of fruits and vegetables do you eat daily?: 2-3  On average, how many sweetened beverages do you drink each day (Examples: soda, juice, sweet tea,  etc.  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, antepartum    History of renal calculi    History of miscarriage    Migraine with aura and without status migrainosus, not intractable    Perioral dermatitis    Liver lesion    Thyroid nodule     Past Medical History:   Diagnosis Date    Acute deep vein thrombosis (DVT) of proximal vein of left lower extremity (H)     post partum    Anxiety     Hidradenitis suppurativa     Infection due to 2019 novel coronavirus     Liver lesion 9/22/2023    Migraine with aura and without status migrainosus, not intractable 9/22/2023    Nephrolithiasis     Plantar fascial fibromatosis of right foot     Thyroid nodule 9/22/2023    Varicella      Past Surgical History:   Procedure Laterality Date    DILATION AND CURETTAGE SUCTION N/A 6/2/2023    Procedure: DILATION AND CURETTAGE, UTERUS, USING SUCTION;  Surgeon: Lulu Leone MD;  Location: [facility] OR    DILATION AND CURETTAGE SUCTION N/A 8/30/2023    Procedure: DILATION AND CURETTAGE, UTERUS, USING SUCTION;  Surgeon: Deborah Zurita MD;  Location: [facility] OR    EXCISE MASS FOOT Right 01/17/2023    Procedure: Excision and Neuroplasty of Right Plantar Foot Mass;  Surgeon: Hunter Redding MD;  Location: [facility] OR    [place] teeth extraction       Current Outpatient Medications   Medication    acetaminophen (TYLENOL) 500 MG tablet    Prenatal Vit-DSS-Fe Cbn-FA (PRENATAL AD PO)     No current facility-administered medications for this visit.     No Known  Allergies  Social History     Socioeconomic History    Marital status:      Spouse name: Not on file    Number of children: Not on file    Years of education: Not on file    Highest education level: Not on file   Occupational History    Not on file   Tobacco Use    Smoking status: Never     Passive exposure: Never    Smokeless tobacco: Never   Vaping Use    Vaping Use: Never used   Substance and Sexual Activity    Alcohol use: Yes     Alcohol/week: 1.0 - 2.0 standard drink of alcohol     Types: 1 - 2 Standard drinks or equivalent per week     Comment: 1-2 twice a week    Drug use: Never    Sexual activity: Yes     Partners: Male   Other Topics Concern    Not on file   Social History Narrative    Not on file     Social Determinants of Health     Financial Resource Strain: Low Risk  (9/21/2023)    Financial Resource Strain     Within the past 12 months, have you or your family members you live with been unable to get utilities (heat, electricity) when it was really needed?: No   Food Insecurity: Low Risk  (9/21/2023)    Food Insecurity     Within the past 12 months, did you worry that your food would run out before you got money to buy more?: No     Within the past 12 months, did the food you bought just not last and you didn t have money to get more?: No   Transportation Needs: Low Risk  (9/21/2023)    Transportation Needs     Within the past 12 months, has lack of transportation kept you from medical appointments, getting your medicines, non-medical meetings or appointments, work, or from getting things that you need?: No   Physical Activity: Not on file   Stress: Not on file   Social Connections: Not on file   Interpersonal Safety: Low Risk  (9/22/2023)    Interpersonal Safety     Do you feel physically and emotionally safe where you currently live?: Yes     Within the past 12 months, have you been hit, slapped, kicked or otherwise physically hurt by someone?: No     Within the past 12 months, have you been  "humiliated or emotionally abused in other ways by your partner or ex-partner?: No   Housing Stability: Low Risk  (2023)    Housing Stability     Do you have housing? : Yes     Are you worried about losing your housing?: No     Family History   Problem Relation Age of Onset    Thyroid Disease Mother     Obesity Mother     Hypertension Mother     Depression Mother     Diabetes Father     Nephrolithiasis Father         uric acid    Preeclampsia Sister     Diabetes Brother     Depression Brother     No Known Problems Brother     Depression Brother     Other - See Comments Maternal Grandmother         superficial blood clots    Dementia Maternal Grandfather     Heart Disease Maternal Grandfather     Dementia Paternal Grandmother     Diabetes Paternal Grandfather     Heart Failure Paternal Grandfather     Alzheimer Disease Paternal Grandfather      Birth Son     Other - See Comments Son         medically complex, multiple congenital anomalies    Pulmonary Embolism Paternal Uncle         fatal    Pulmonary Embolism Other         fatal    Anesthesia Reaction No family hx of      Review of systems is as stated in HPI, and the remainder of system review is otherwise negative.    Objective:     /78 (BP Location: Left arm, Patient Position: Sitting, Cuff Size: Adult Regular)   Pulse 88   Temp 98.1  F (36.7  C) (Temporal)   Resp 20   Ht 1.745 m (5' 8.7\")   Wt 111 kg (244 lb 11.4 oz)   LMP 2023   SpO2 99%   Breastfeeding No   BMI 36.45 kg/m      General appearance: awake, NAD, obese  HEENT: atraumatic, normocephalic, no scleral icterus or injection  Lungs: breathing comfortably on room air  Neuro: alert, oriented x3, CNs grossly intact, no focal deficits appreciated  Psych: normal mood/affect/behavior, answering questions appropriately, linear thought process  "

## 2023-09-24 ENCOUNTER — MYC MEDICAL ADVICE (OUTPATIENT)
Dept: FAMILY MEDICINE | Facility: CLINIC | Age: 33
End: 2023-09-24
Payer: COMMERCIAL

## 2023-09-24 DIAGNOSIS — L73.2 HIDRADENITIS SUPPURATIVA: Primary | ICD-10-CM

## 2023-09-24 LAB — DEPRECATED CALCIDIOL+CALCIFEROL SERPL-MC: 26 UG/L (ref 20–75)

## 2023-09-25 ENCOUNTER — TRANSCRIBE ORDERS (OUTPATIENT)
Dept: MATERNAL FETAL MEDICINE | Facility: HOSPITAL | Age: 33
End: 2023-09-25
Payer: COMMERCIAL

## 2023-09-25 DIAGNOSIS — Z31.69 ENCOUNTER FOR PRECONCEPTION CONSULTATION: Primary | ICD-10-CM

## 2023-09-26 DIAGNOSIS — Z31.69 ENCOUNTER FOR PRECONCEPTION CONSULTATION: Primary | ICD-10-CM

## 2023-09-27 ENCOUNTER — HOSPITAL ENCOUNTER (OUTPATIENT)
Dept: ULTRASOUND IMAGING | Facility: CLINIC | Age: 33
Discharge: HOME | End: 2023-09-27
Attending: FAMILY MEDICINE | Admitting: FAMILY MEDICINE
Payer: COMMERCIAL

## 2023-09-27 DIAGNOSIS — K76.9 LIVER LESION: ICD-10-CM

## 2023-09-27 PROCEDURE — 76705 ECHO EXAM OF ABDOMEN: CPT

## 2023-10-04 ENCOUNTER — TRANSFERRED RECORDS (OUTPATIENT)
Dept: HEALTH INFORMATION MANAGEMENT | Facility: CLINIC | Age: 33
End: 2023-10-04

## 2023-11-02 ENCOUNTER — OFFICE VISIT (OUTPATIENT)
Dept: HEMATOLOGY | Facility: CLINIC | Age: 33
End: 2023-11-02
Payer: COMMERCIAL

## 2023-11-02 VITALS
HEART RATE: 60 BPM | WEIGHT: 243.3 LBS | BODY MASS INDEX: 36.24 KG/M2 | SYSTOLIC BLOOD PRESSURE: 122 MMHG | TEMPERATURE: 95.4 F | DIASTOLIC BLOOD PRESSURE: 76 MMHG

## 2023-11-02 DIAGNOSIS — I80.9 THROMBOPHLEBITIS DUE TO PROCEDURE: ICD-10-CM

## 2023-11-02 DIAGNOSIS — Z82.49 FAMILY HISTORY OF PULMONARY EMBOLISM: ICD-10-CM

## 2023-11-02 DIAGNOSIS — Z86.718 PERSONAL HISTORY OF DVT (DEEP VEIN THROMBOSIS): Primary | ICD-10-CM

## 2023-11-02 DIAGNOSIS — T81.72XA THROMBOPHLEBITIS DUE TO PROCEDURE: ICD-10-CM

## 2023-11-02 DIAGNOSIS — Z87.59 HISTORY OF MISCARRIAGE: ICD-10-CM

## 2023-11-02 LAB — FACT VIII ACT/NOR PPP: 186 % (ref 55–200)

## 2023-11-02 PROCEDURE — 85613 RUSSELL VIPER VENOM DILUTED: CPT | Performed by: PHYSICIAN ASSISTANT

## 2023-11-02 PROCEDURE — 86147 CARDIOLIPIN ANTIBODY EA IG: CPT | Performed by: PHYSICIAN ASSISTANT

## 2023-11-02 PROCEDURE — 85240 CLOT FACTOR VIII AHG 1 STAGE: CPT | Performed by: PHYSICIAN ASSISTANT

## 2023-11-02 PROCEDURE — 86146 BETA-2 GLYCOPROTEIN ANTIBODY: CPT | Performed by: PHYSICIAN ASSISTANT

## 2023-11-02 PROCEDURE — 99213 OFFICE O/P EST LOW 20 MIN: CPT | Performed by: PHYSICIAN ASSISTANT

## 2023-11-02 PROCEDURE — 85390 FIBRINOLYSINS SCREEN I&R: CPT | Mod: 26 | Performed by: PATHOLOGY

## 2023-11-02 PROCEDURE — 36415 COLL VENOUS BLD VENIPUNCTURE: CPT | Performed by: PHYSICIAN ASSISTANT

## 2023-11-02 RX ORDER — PROGESTERONE 200 MG/1
400 CAPSULE ORAL 2 TIMES DAILY
COMMUNITY
Start: 2023-11-01 | End: 2024-01-25

## 2023-11-02 RX ORDER — ENOXAPARIN SODIUM 100 MG/ML
40 INJECTION SUBCUTANEOUS DAILY
Qty: 12 ML | Refills: 8 | Status: SHIPPED | OUTPATIENT
Start: 2023-11-02 | End: 2023-11-02

## 2023-11-02 RX ORDER — ENOXAPARIN SODIUM 100 MG/ML
40 INJECTION SUBCUTANEOUS DAILY
Qty: 12 ML | Refills: 8 | Status: SHIPPED | OUTPATIENT
Start: 2023-11-02 | End: 2024-03-04

## 2023-11-02 NOTE — PROGRESS NOTES
[facility]  [address] [place] [address] [address], [place] [address]  Main: [phone], Fax: [phone]    [facility] at: [facility] at [address] [place] [address]    Outpatient Visit Note:    Patient: Toma Blanco  MRN: [id number]  : 1990  GERI: 2023    Reason for visit:  Follow up, history of superficial vein thrombosis, DVT     Clinical History Summary:  Toma Blanco is a 32 year old female with past medical history significant for post partum DVT and superficial thrombophlebitis after D&C procedure who presents today for follow up.      She established care earlier this year and underwent hypercoagulable evaluation as her family history is notable for paternal uncle and paternal great aunt who both had fatal pulmonary emboli. She was planning on TTC again after miscarriage and wanted to discuss plans for anticoagulation.     Her FVL and prothrombin gene mutation testing was negative at outside system. Her protein C, S, and antithormbin III levels were normal. She did have APS labs due to history of miscarriage and thrombosis that showed negative lupus anticoagulant, negative beta-2-glycoprotein antibodies and a very weak positive cardiolipin IgG antibody at 15. Clinically significant positive is >40 for antiphospholipid antibody syndrome. Repeat labs were scheduled for 3 months.     Shortly after our visit, she notified the office she was pregnant. Due to delay with insurance, she did not start Lovenox until 6 weeks pregnant. She unfortunately, had some spotting and twin miscarriage with large subchorionic hemorrhage. She had a D&C on 2023 and was provided prophylactic Eliquis at 2.5mg twice daily x 5 days after the procedure for superficial vein thrombosis prophylaxis. Unfortunately, she still developed superficial thrombophlebitis after IV insertion. She was started on Eliquis 5mg twice daily x 14 days with  resolution of symptoms.     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 We elected to delay initiation of Lovenox until ultrasound imaging confirms successful implantation without evidence of subchorionic hemorrhage.     Interim History:  Today, Toma notes that she has since established care with high risk pregnancy provider/MFM and has been recommended to start Lovenox 40mg once daily, Progesterone and Alpha Lipoic Acid at ovulation while TTC. She has done this for one month with unsuccessful conception. She is currently running low on Lovenox and although placed a refill through her specialty pharmacy has not yet received it. She has an active PA through 7/2024. She notes no recent episodes of superficial vein thrombosis. Her prior symptoms resolved within a few days of Eliquis. She has no other upcoming surgeries or procedures. She notes with prior miscarriage, she had concerns about excessive bleeding with large clot passage. She wonders what to do with Lovenox if this occurs again.     ROS:  Denies any bleeding complications. Specifically, no frequent epistaxis. No issues with oral mucosal bleeding. Denies any hematuria or blood in stools. Denies any shortness of breath. No chest pain. No cough. No fever.      Medications:   Current Outpatient Medications   Medication Sig Dispense Refill    acetaminophen (TYLENOL) 500 MG tablet Take 500-1,000 mg by mouth every 8 hours as needed for mild pain      enoxaparin ANTICOAGULANT (LOVENOX) 40 MG/0.4ML syringe Inject 0.4 mLs (40 mg) Subcutaneous daily 12 mL 8    Prenatal Vit-DSS-Fe Cbn-FA (PRENATAL AD PO) Take 1 tablet by mouth daily      progesterone (PROMETRIUM) 200 MG capsule           Allergies:    No Known Allergies    PMH:  Past Medical History:   Diagnosis Date    Acute deep vein thrombosis (DVT) of proximal vein  of left lower extremity (H)     post partum    Anxiety     Hidradenitis suppurativa     Infection due to 2019 novel coronavirus     Liver lesion 9/22/2023    Migraine with aura and without status migrainosus, not intractable 9/22/2023    Nephrolithiasis     Plantar fascial fibromatosis of right foot     Thyroid nodule 9/22/2023    Varicella         Social History:   Social History     Tobacco Use    Smoking status: Never     Passive exposure: Never    Smokeless tobacco: Never   Vaping Use    Vaping Use: Never used   Substance Use Topics    Alcohol use: Yes     Alcohol/week: 1.0 - 2.0 standard drink of alcohol     Types: 1 - 2 Standard drinks or equivalent per week     Comment: 1-2 twice a week    Drug use: Never       Family History:  Deferred.    Objective:  Vitals: /76 (BP Location: Right arm, Patient Position: Sitting, Cuff Size: Adult Large)   Pulse 60   Temp (!) 95.4  F (35.2  C) (Tympanic)   Wt 110.4 kg (243 lb 4.8 oz)   PF 99 L/min   BMI 36.24 kg/m       Exam:   Constitutional: Appears well, no distress  HEENT: Pupils equal and round. No scleral icterus or hemorrhage.   Respiratory: no increased work of breathing.   Mus/Skele: no edema of lower extremities or upper extremities. No evidence of superficial thrombophlebitis without erythema, warmth, tenderness to superficial vein palpation of upper extremities bilaterally.   Skin: no petechiae, no ecchymosis on exposed dermis.  Neuro: CN II-XII intact. Normal gait. AOx3      Labs:  CBC RESULTS:   Recent Labs   Lab Test 08/29/23  0830   WBC 8.4   RBC 4.32   HGB 12.2   HCT 36.1   MCV 84   MCH 28.2   MCHC 33.8   RDW 13.7        Last Comprehensive Metabolic Panel:  Sodium   Date Value Ref Range Status   08/25/2023 138 136 - 145 mmol/L Final     Potassium   Date Value Ref Range Status   08/25/2023 4.0 3.5 - 5.0 mmol/L Final     Chloride   Date Value Ref Range Status   08/25/2023 106 98 - 107 mmol/L Final     Carbon Dioxide (CO2)   Date Value Ref  Range Status   08/25/2023 21 (L) 22 - 31 mmol/L Final     Anion Gap   Date Value Ref Range Status   08/25/2023 11 5 - 18 mmol/L Final     Glucose   Date Value Ref Range Status   08/25/2023 89 70 - 125 mg/dL Final     Urea Nitrogen   Date Value Ref Range Status   08/25/2023 8 8 - 22 mg/dL Final     Creatinine   Date Value Ref Range Status   08/25/2023 0.65 0.60 - 1.10 mg/dL Final     GFR Estimate   Date Value Ref Range Status   08/25/2023 >90 >60 mL/min/1.73m2 Final     Calcium   Date Value Ref Range Status   08/25/2023 9.2 8.5 - 10.5 mg/dL Final     Liver Function Studies -   Recent Labs   Lab Test 08/14/23  1154   PROTTOTAL 6.9   ALBUMIN 3.7   BILITOTAL 0.2   ALKPHOS 50   AST 13   ALT 12        Imaging:  No results found for this or any previous visit (from the past 744 hour(s)).     Assessment:  History of post partum DVT   History of recurrent early miscarriage   5/2023, 8/2023 with subchorionic hemorrhage  APS labs with single weak positive cardiolipin IgG at 15  History of recurrent superficial thrombophlebitis after D&C procedures, treated with Eliquis 5mg twice daily x 14 days.   Family history of fatal venous thromboembolism with unremarkable inheritable thrombophilia evaluation   No history of superficial vein thrombosis or DVT when not under influence of estrogen.     Plan:  Repeat APS panel, draw factor 8 assay.   She will proceed with Lovenox at prophylactic intensity 40mg every day, progesterone and alpha lipoic acid starting at ovulation while TTC per her [facility] provider. She will call if she has a positive pregnancy test. She will require closer ultrasound monitoring with pregnancies to monitor for subchorionic hemorrhage per [facility]. If she were to have miscarriage and require D&C, she will call if having excessive bleeding to discuss Lovenox hold and plans for procedures. Would require anticoagulation after any prolonged IV placement for superficial vein thrombosis prevention in setting of miscarriage.      Follow up as needed.     Magdalena Collins, MPH, PA-C  [facility]    20 minutes spent by me on the date of the encounter doing chart review, review of outside records, review of test results, interpretation of tests, patient visit, and documentation

## 2023-11-02 NOTE — PATIENT INSTRUCTIONS
[facility]  [facility]  [facility], [address], [place] [address]  Main: [phone], Fax: [phone]    Toma,   It was a pleasure seeing you today.  Thank you for allowing us to be involved in your care.  Please let us know if there is anything else we can do for you, so that we can be sure you are leaving completely satisfied with your care experience. Below is the plan that we discussed.     Continue plan for Lovenox at ovulation.   Call with positive pregnancy test   Call if any issues with obtaining Lovenox       Return to clinic as needed    If you have questions or concerns, please don't hesitate to send a PaperV Message for non urgent matters or contact my nurse clinician, Maru Ceron at [phone]. If they are unavailable and you have immediate concerns, please call [phone] and ask for a nurse.     Magdalena Collins, MPH, PA-C  [facility]

## 2023-11-03 LAB
DRVVT SCREEN RATIO: 0.9
INR PPP: 1 (ref 0.85–1.15)
LA PPP-IMP: NEGATIVE
LUPUS INTERPRETATION: NORMAL
PTT RATIO: 1.06
THROMBIN TIME: 17.2 SECONDS (ref 13–19)

## 2023-11-07 LAB
B2 GLYCOPROT1 IGG SERPL IA-ACNC: 1.3 U/ML
B2 GLYCOPROT1 IGM SERPL IA-ACNC: 2.9 U/ML
CARDIOLIPIN IGG SER IA-ACNC: 14 GPL-U/ML
CARDIOLIPIN IGG SER IA-ACNC: ABNORMAL
CARDIOLIPIN IGM SER IA-ACNC: 5.4 MPL-U/ML
CARDIOLIPIN IGM SER IA-ACNC: NEGATIVE

## 2023-12-05 ENCOUNTER — LAB REQUISITION (OUTPATIENT)
Dept: LAB | Facility: CLINIC | Age: 33
End: 2023-12-05
Payer: COMMERCIAL

## 2023-12-05 DIAGNOSIS — N96 RECURRENT PREGNANCY LOSS: ICD-10-CM

## 2023-12-05 LAB
HCG INTACT+B SERPL-ACNC: 3491 MIU/ML
PROGEST SERPL-MCNC: 20.7 NG/ML

## 2023-12-05 PROCEDURE — 84144 ASSAY OF PROGESTERONE: CPT | Mod: ORL | Performed by: OBSTETRICS & GYNECOLOGY

## 2023-12-05 PROCEDURE — 36415 COLL VENOUS BLD VENIPUNCTURE: CPT | Mod: ORL | Performed by: OBSTETRICS & GYNECOLOGY

## 2023-12-05 PROCEDURE — 84702 CHORIONIC GONADOTROPIN TEST: CPT | Mod: ORL | Performed by: OBSTETRICS & GYNECOLOGY

## 2023-12-07 ENCOUNTER — TRANSFERRED RECORDS (OUTPATIENT)
Dept: HEALTH INFORMATION MANAGEMENT | Facility: CLINIC | Age: 33
End: 2023-12-07

## 2023-12-07 ENCOUNTER — LAB REQUISITION (OUTPATIENT)
Dept: LAB | Facility: CLINIC | Age: 33
End: 2023-12-07
Payer: COMMERCIAL

## 2023-12-07 DIAGNOSIS — N96 RECURRENT PREGNANCY LOSS: ICD-10-CM

## 2023-12-07 LAB — HCG INTACT+B SERPL-ACNC: 6476 MIU/ML

## 2023-12-07 PROCEDURE — 36415 COLL VENOUS BLD VENIPUNCTURE: CPT | Mod: ORL | Performed by: OBSTETRICS & GYNECOLOGY

## 2023-12-07 PROCEDURE — 84702 CHORIONIC GONADOTROPIN TEST: CPT | Mod: ORL | Performed by: OBSTETRICS & GYNECOLOGY

## 2023-12-19 ENCOUNTER — TELEPHONE (OUTPATIENT)
Dept: HEMATOLOGY | Facility: CLINIC | Age: 33
End: 2023-12-19
Payer: COMMERCIAL

## 2023-12-19 ENCOUNTER — HOSPITAL ENCOUNTER (OUTPATIENT)
Dept: ULTRASOUND IMAGING | Facility: CLINIC | Age: 33
Discharge: HOME | End: 2023-12-19
Attending: PHYSICIAN ASSISTANT | Admitting: PHYSICIAN ASSISTANT
Payer: COMMERCIAL

## 2023-12-19 DIAGNOSIS — Z86.718 PERSONAL HISTORY OF DVT (DEEP VEIN THROMBOSIS): ICD-10-CM

## 2023-12-19 DIAGNOSIS — Z86.718 PERSONAL HISTORY OF DVT (DEEP VEIN THROMBOSIS): Primary | ICD-10-CM

## 2023-12-19 PROCEDURE — 93971 EXTREMITY STUDY: CPT | Mod: LT

## 2023-12-19 NOTE — CONFIDENTIAL NOTE
[id number]  Toma Blanco  32 year old female  [facility]CD Diagnosis: Postpartum DVT and superficial thrombophlebitis  CBCD Provider: Magdalena Martin PA-C    RN received a call from Toma regarding new symptoms. She reports intermittent calf pain in her left leg (same leg that she had her DVT previously). This has been happening for the past two days. She is currently pregnant and on prophylactic dosing of Lovenox (40 mg subcutaneous daily). She has not missed any doses but did have about a 12 hour delay in taking a dose after she started experiencing bleeding. She reports her family has been ill and she has been pretty immobile. She denies any redness or swelling. She is anxious about a new clot forming due to her history. RN did discuss with Hanna Salcido PA-C who recommends ordering and having an ultrasound completed.     RN worked with Toma- this will be done at [facility] on 12/19/23 at 1:45 pm .     Vicenta GORMAN, RN, N  [facility]  Office: [phone]  Fax: [phone]    Addendum  RN called Toma to let her know ultrasound imaging was negative for DVT. She can continue on Lovenox 40 mg subcutaneous injection daily. She should call us if she has any new pain, swelling, or redness. Toma did not answer so this information was left in a detailed voicemail message and call back number was provided.     Vicenta GORMAN, RN, N  [facility]  Office: [phone]  Fax: [phone]

## 2023-12-21 ENCOUNTER — TELEPHONE (OUTPATIENT)
Dept: HEMATOLOGY | Facility: CLINIC | Age: 33
End: 2023-12-21
Payer: COMMERCIAL

## 2023-12-21 ENCOUNTER — NURSE TRIAGE (OUTPATIENT)
Dept: FAMILY MEDICINE | Facility: CLINIC | Age: 33
End: 2023-12-21
Payer: COMMERCIAL

## 2023-12-21 NOTE — TELEPHONE ENCOUNTER
[id number]  Toma Blanco  32 year old female  CBCD Diagnosis: Hx of DVT  CBCD Provider: Dennis    Patient called and stated that she was having worsening cramps in her left leg.  They are more painful and more frequent than a couple of days ago when she had a negative ultrasound. Her symptoms typically occur when she is walking and she will get spasms that last 10-15 seconds and then go away. They are in the center of her calf.    She has a history of DVT in the same leg. She is currently 7 weeks pregnant and taking Lovenox 40 mg  daily. She has not missed any doses except one night when she had a bit of bleeding and held her dose until the next morning.    Discussed with SALENA Gibbons. She would like to watch and wait. She stated that we could order an US for next Tuesday and schedule. If patient's symptoms have resolved, we can cancel this.  She stated that cramping is fairly common in pregnancy and she could try OTC magnesium or possibly tonic water. She should check with her OB team to see if they have a preference.    Communicated directions in a detailed message to patient. I asked that she call back with time preference on the US.  Meli Bernard, MSN, RN, PHN -Nurse Clinician, [facility] [phone]

## 2023-12-21 NOTE — TELEPHONE ENCOUNTER
"Nurse Triage SBAR    Is this a 2nd Level Triage? YES, LICENSED PRACTITIONER REVIEW IS REQUIRED    Situation:   Patient calling with concerns of calf pain    Background:   7 week pregnant - DVT in previous pregnancy  Lovenox prophylaxis   US 12/19/23 - negative     Assessment:   1. ONSET:       About 5 days ago   2. LOCATION:       Left leg - calf   3. PAIN:       Cramping - intensifying 6/10  5. CAUSE:       Currently pregnant - had DVT in previous pregnancy   6. OTHER SYMPTOMS:       No swelling, no redness   7. PREGNANCY: \"Is there any chance you are pregnant?\" \"When was your last menstrual period?\"  Currently pregnant - 7 weeks   Currently on lovenox prophylaxis    Protocol Recommended Disposition:   Go To ED/UCC Now (Or To Office With PCP Approval)    Recommendation:   Home care advice given   Huddled with PCP to discuss patient concerns due to recent negative US.  PCP states there is a low chance of DVT due to recent negative US and use of prophylaxis medication    Recommends that patient go be seen if she has increasing of concerns of DVT, otherwise does not feel another appointment is required.  Writer gave these recommendations to patient and discussed signs and symptoms of DVT with patient to watch for. Patient verbalized understanding and agrees with plan.     Discussed in person    Does the patient meet one of the following criteria for ADS visit consideration? 16+ years old, with an [facility] PCP     TIP  Providers, please consider if this condition is appropriate for management at one of our Acute and Diagnostic Services sites.     If patient is a good candidate, please use dotphrase <dot>triageresponse and select Refer to ADS to document.    Reason for Disposition   Thigh or calf pain in only one leg and present > 1 hour    Additional Information   Negative: Looks like a broken bone or dislocated joint (e.g., crooked or deformed)   Negative: Sounds like a life-threatening emergency to the triager   " "Negative: Chest pain   Negative: Difficulty breathing   Negative: Entire foot is cool or blue in comparison to other side   Negative: Unable to walk   Negative: Fever and red area (or area very tender to touch)   Negative: Swollen joint and fever    Answer Assessment - Initial Assessment Questions  1. ONSET:       About 5 days ago   2. LOCATION:       Left leg - calf   3. PAIN:       Cramping - intensifying 6/10  5. CAUSE:       Currently pregnant - had DVT in previous pregnancy   6. OTHER SYMPTOMS:       No swelling, no redness   7. PREGNANCY: \"Is there any chance you are pregnant?\" \"When was your last menstrual period?\"  Currently pregnant - 7 weeks   Currently on lovenox prophylaxis    Protocols used: Leg Pain-A-OH    RUCHI JimenezN, RN  [facility]    "

## 2023-12-22 ENCOUNTER — HOSPITAL ENCOUNTER (EMERGENCY)
Facility: CLINIC | Age: 33
Discharge: HOME | End: 2023-12-22
Attending: EMERGENCY MEDICINE | Admitting: EMERGENCY MEDICINE
Payer: COMMERCIAL

## 2023-12-22 ENCOUNTER — E-VISIT (OUTPATIENT)
Dept: FAMILY MEDICINE | Facility: CLINIC | Age: 33
End: 2023-12-22
Payer: COMMERCIAL

## 2023-12-22 ENCOUNTER — MYC MEDICAL ADVICE (OUTPATIENT)
Dept: FAMILY MEDICINE | Facility: CLINIC | Age: 33
End: 2023-12-22
Payer: COMMERCIAL

## 2023-12-22 ENCOUNTER — MEDICAL CORRESPONDENCE (OUTPATIENT)
Dept: HEALTH INFORMATION MANAGEMENT | Facility: CLINIC | Age: 33
End: 2023-12-22

## 2023-12-22 ENCOUNTER — APPOINTMENT (OUTPATIENT)
Dept: ULTRASOUND IMAGING | Facility: CLINIC | Age: 33
End: 2023-12-22
Payer: COMMERCIAL

## 2023-12-22 ENCOUNTER — TRANSFERRED RECORDS (OUTPATIENT)
Dept: HEALTH INFORMATION MANAGEMENT | Facility: CLINIC | Age: 33
End: 2023-12-22

## 2023-12-22 VITALS
TEMPERATURE: 98.1 F | HEIGHT: 70 IN | RESPIRATION RATE: 16 BRPM | WEIGHT: 240 LBS | HEART RATE: 85 BPM | BODY MASS INDEX: 34.36 KG/M2 | OXYGEN SATURATION: 97 % | DIASTOLIC BLOOD PRESSURE: 70 MMHG | SYSTOLIC BLOOD PRESSURE: 120 MMHG

## 2023-12-22 DIAGNOSIS — O20.9 VAGINAL BLEEDING IN PREGNANCY, FIRST TRIMESTER: ICD-10-CM

## 2023-12-22 DIAGNOSIS — H10.33 ACUTE CONJUNCTIVITIS OF BOTH EYES, UNSPECIFIED ACUTE CONJUNCTIVITIS TYPE: Primary | ICD-10-CM

## 2023-12-22 DIAGNOSIS — Z3A.01 7 WEEKS GESTATION OF PREGNANCY: ICD-10-CM

## 2023-12-22 LAB
ABO/RH(D): NORMAL
ALBUMIN SERPL BCG-MCNC: 4.3 G/DL (ref 3.5–5.2)
ALP SERPL-CCNC: 63 U/L (ref 40–150)
ALT SERPL W P-5'-P-CCNC: 17 U/L (ref 0–50)
ANTIBODY SCREEN: NEGATIVE
AST SERPL W P-5'-P-CCNC: 12 U/L (ref 0–45)
BILIRUB DIRECT SERPL-MCNC: <0.2 MG/DL (ref 0–0.3)
BILIRUB SERPL-MCNC: 0.2 MG/DL
ERYTHROCYTE [DISTWIDTH] IN BLOOD BY AUTOMATED COUNT: 15.7 % (ref 10–15)
HCG INTACT+B SERPL-ACNC: ABNORMAL MIU/ML
HCT VFR BLD AUTO: 37.8 % (ref 35–47)
HGB BLD-MCNC: 12.3 G/DL (ref 11.7–15.7)
INR PPP: 1.02 (ref 0.85–1.15)
MCH RBC QN AUTO: 25.4 PG (ref 26.5–33)
MCHC RBC AUTO-ENTMCNC: 32.5 G/DL (ref 31.5–36.5)
MCV RBC AUTO: 78 FL (ref 78–100)
PLATELET # BLD AUTO: 360 10E3/UL (ref 150–450)
PROT SERPL-MCNC: 7.1 G/DL (ref 6.4–8.3)
RBC # BLD AUTO: 4.84 10E6/UL (ref 3.8–5.2)
SPECIMEN EXPIRATION DATE: NORMAL
WBC # BLD AUTO: 11.2 10E3/UL (ref 4–11)

## 2023-12-22 PROCEDURE — 76801 OB US < 14 WKS SINGLE FETUS: CPT

## 2023-12-22 PROCEDURE — 86901 BLOOD TYPING SEROLOGIC RH(D): CPT

## 2023-12-22 PROCEDURE — 85027 COMPLETE CBC AUTOMATED: CPT

## 2023-12-22 PROCEDURE — 85610 PROTHROMBIN TIME: CPT

## 2023-12-22 PROCEDURE — 86850 RBC ANTIBODY SCREEN: CPT

## 2023-12-22 PROCEDURE — 99285 EMERGENCY DEPT VISIT HI MDM: CPT | Mod: 25

## 2023-12-22 PROCEDURE — 99421 OL DIG E/M SVC 5-10 MIN: CPT | Performed by: FAMILY MEDICINE

## 2023-12-22 PROCEDURE — 36415 COLL VENOUS BLD VENIPUNCTURE: CPT

## 2023-12-22 PROCEDURE — 82040 ASSAY OF SERUM ALBUMIN: CPT

## 2023-12-22 PROCEDURE — 84702 CHORIONIC GONADOTROPIN TEST: CPT

## 2023-12-22 RX ORDER — POLYMYXIN B SULFATE AND TRIMETHOPRIM 1; 10000 MG/ML; [USP'U]/ML
SOLUTION OPHTHALMIC
Qty: 10 ML | Refills: 0 | Status: SHIPPED | OUTPATIENT
Start: 2023-12-22 | End: 2023-12-29

## 2023-12-22 NOTE — TELEPHONE ENCOUNTER
Writer send directions for E-Visit via [employer] to the patient for pink eye symptoms.    Audrey Nguyen RN, BSN  [facility]

## 2023-12-22 NOTE — PATIENT INSTRUCTIONS
Thank you for choosing us for your care. I have placed an order for a prescription so that you can start treatment. View your full visit summary for details by clicking on the link below. Your pharmacist will able to address any questions you may have about the medication.     If you re not feeling better within 2-3 days, please schedule an appointment.  You can schedule an appointment right here in [facility], or call [phone]  If the visit is for the same symptoms as your eVisit, we ll refund the cost of your eVisit if seen within seven days.    Pinkeye: Care Instructions  Overview     Pinkeye is redness and swelling of the eye surface and the conjunctiva (the lining of the eyelid and the covering of the white part of the eye). Pinkeye is also called conjunctivitis. Pinkeye is often caused by infection with bacteria or a virus. Dry air, allergies, smoke, and chemicals are other common causes.  Pinkeye often gets better on its own in 7 to 10 days. Antibiotics only help if the pinkeye is caused by bacteria. Pinkeye caused by infection spreads easily. If an allergy or chemical is causing pinkeye, it will not go away unless you can avoid whatever is causing it.  Follow-up care is a key part of your treatment and safety. Be sure to make and go to all appointments, and call your doctor if you are having problems. It's also a good idea to know your test results and keep a list of the medicines you take.  How can you care for yourself at home?  Wash your hands often. Always wash them before and after you treat pinkeye or touch your eyes or face.  Use moist cotton or a clean, wet cloth to remove crust. Wipe from the inside corner of the eye to the outside. Use a clean part of the cloth for each wipe.  Put cold or warm wet cloths on your eye a few times a day if the eye hurts.  Do not wear contact lenses or eye makeup until the pinkeye is gone. Throw away any eye makeup you were using when you got pinkeye. Clean your  "contacts and storage case. If you wear disposable contacts, use a new pair when your eye has cleared and it is safe to wear contacts again.  If the doctor gave you antibiotic ointment or eyedrops, use them as directed. Use the medicine for as long as instructed, even if your eye starts looking better soon. Keep the bottle tip clean, and do not let it touch the eye area.  To put in eyedrops or ointment:  Tilt your head back, and pull your lower eyelid down with one finger.  Drop or squirt the medicine inside the lower lid.  Close your eye for 30 to 60 seconds to let the drops or ointment move around.  Do not touch the ointment or dropper tip to your eyelashes or any other surface.  Do not share towels, pillows, or washcloths while you have pinkeye.  When should you call for help?   Call your doctor now or seek immediate medical care if:    You have pain in your eye, not just irritation on the surface.     You have a change in vision or loss of vision.     You have an increase in discharge from the eye.     Your eye has not started to improve or begins to get worse within 48 hours after you start using antibiotics.     Pinkeye lasts longer than 7 days.   Watch closely for changes in your health, and be sure to contact your doctor if you have any problems.  Where can you learn more?  Go to https://www.[employer].net/patiented  Enter Y392 in the search box to learn more about \"Pinkeye: Care Instructions.\"  Current as of: June 6, 2023               Content Version: 13.8    1687-0549 [employer].   Care instructions adapted under license by your healthcare professional. If you have questions about a medical condition or this instruction, always ask your healthcare professional. [employer] disclaims any warranty or liability for your use of this information.      "

## 2023-12-22 NOTE — ED TRIAGE NOTES
"Arrives to ED with c/o vaginal bleeding. Pt is 7 weeks pregnant. Takes lovenox and ASA for clot hx. Has been having bleeding beginning at 5 weeks pregnant. Today had \"gush\" at 1500, bright red. Hx of miscarriage x2 within past year. Has known subchorionic hemorrhage. Denies lightheadedness/dizziness.      Triage Assessment (Adult)       Row Name 12/22/23 8807          Triage Assessment    Airway WDL WDL        Respiratory WDL    Respiratory WDL WDL        Skin Circulation/Temperature WDL    Skin Circulation/Temperature WDL WDL        Cardiac WDL    Cardiac WDL WDL        Peripheral/Neurovascular WDL    Peripheral Neurovascular WDL WDL        Cognitive/Neuro/Behavioral WDL    Cognitive/Neuro/Behavioral WDL WDL                     "

## 2023-12-22 NOTE — ED PROVIDER NOTES
Emergency Department Encounter  [facility] EMERGENCY ROOM  Toma Blanco   AGE: 32 year old SEX: female  YOB: 1990  MRN: [id number]      EVALUATION DATE & TIME: No admission date for patient encounter. ; PCP: Alesia Harris   ED PROVIDER: Mindy Barton PA-C    CHIEF COMPLAINT: Vaginal Bleeding - Pregnant      MEDICAL DECISION MAKING   Toma Blanco is a 32 year old  female with a history of DVT currently anticoagulated on Lovenox and ASA, thyroid nodule, and recurrent pregnancy loss presenting to the ED for evaluation of vaginal bleeding.  Patient has had vaginal bleeding on and off for 2 weeks which is not unexpected giving anticoagulation.  She estimates she as at about 7 weeks gestation. Today she experienced a gush of blood vaginally and was advised by nurse triage to seek medical attention.  She endorses intermittent vaginal cramping but says this is minimal in intensity.  She recently experienced a missed  on 23 and underwent D&C on 23. She has another missed  on 23 and D&C on 23. Patient is seeing Alesia Harris MD  for obstetric care.    Vitals reviewed and hemodynamically stable, afebrile. On exam, patient is well appearing and in no acute distress.  No abdominal tenderness or evidence of acute abdomen.  No CVA tenderness.     Symptoms and work up most consistent with vaginal bleeding in pregnancy. Transvaginal ultrasound revealed a living intrauterine gestation at 7 weeks 2 days with possible subchorionic hemorrhage. Serum hCG pregnancy positive at 74,467. Hemoglobin is stable at 12.3 without evidence of significant blood loss.  Given imaging and lab results, I have minimal concern for ectopic pregnancy. Patient is Rho positive so Rho lauren was not indicated. No vaginal discharge or colicky abdominal pain that would raise concern for sexually transmitted infection, pelvic inflammatory disease, or  tuboovarian abscess.  OB/GYN consulted and recommends discharge with outpatient follow-up.  Per OB/GYN, vaginal bleeding would not be uncommon in this patient given subchorionic manage and anticoagulation.    Patient is stable and my evaluation has ruled out disease requiring emergent intervention at this time. Patient has a clear understanding of the discharge diagnosis, written discharge instructions, and planned follow-up with clear instructions to return to the emergency department if the condition worsens. Plan to have patient follow-up with OB/GYN for further imaging and management.    History:  Supplemental history from: Documented in chart, if applicable  External Record(s) reviewed: Documented in chart, if applicable.    Work Up:  Chart documentation includes differential considered and any EKGs or imaging independently interpreted by provider, where specified.  In additional to work up documented, I considered the following work up: Documented in chart, if applicable.    External consultation:  Discussion of management with another provider: Documented in chart, if applicable    Complicating factors:  Care impacted by chronic illness: Mental Health  Care affected by social determinants of health: N/A    Disposition considerations: Discharge. No recommendations on prescription strength medication(s). I considered admission, but ultimately discharged patient given hemodynamically stable clinical course, stable hemoglobin, and OB/GYN recommendation.    FINAL IMPRESSION       ICD-10-CM    1. Vaginal bleeding in pregnancy, first trimester  O20.9       2. 7 weeks gestation of pregnancy  Z3A.01           ED COURSE   5:56 PM Due to a shortage of available emergency department rooms, with the patient's permission I met with the patient for the initial interview and physical examination in a triage room. Discussed plan for treatment and workup in the ED.     6:27 PM I have staffed the patient with Dr. Roberts, ED  MD, who has evaluated the patient and agrees with all aspects of today's care.   7:30 PM Consulted OBGYN (Lulu Carter MD) who recommends discharge home and follow-up with OB/GYN outpatient.  7:44 PM I rechecked the patient and discussed results, discharge, follow up, and reasons to return to the ED.     MEDICATIONS GIVEN IN THE EMERGENCY DEPARTMENT:   Medications - No data to display   NEW PRESCRIPTIONS STARTED AT TODAY'S ED VISIT:  Discharge Medication List as of 2023  8:13 PM              =================================================================         HISTORY OF PRESENT ILLNESS   Patient information was obtained from: the patient   Use of Intrepreter: N/A       Toma Blanco is a 32 year old female with a pertinent history of , DVT and anxiety who presents to the ED by private car for evaluation of vaginal bleeding while pregnant.    Per Chart Review:   The patient has a history of recurrent pregnancy loss with a missed  on 23 and D&C on 23 and a missed  on 23 and D&C on 23.   The patient also has a history of DVT and is anticoagulated at this time.     The patient, who is approximately 7 weeks pregnant, reports intermittent vaginal bleeding for the last two weeks. She states that today she had a big gush of blood and saw the onset of lower abdominal pressure. She has not taken any medications for her pain. She also endorses urinary frequency.  She is currently taking lovenox and baby aspirin for superficial blood clots after her two D&Cs over the summer. She states that she had a DVT after she had her son a few years ago. She called her OB/GYN, Dr. Calvo, who referred her to the ED in order to get imaging so they can determine what to do about her blood thinners. The patient denies headache, nausea, vomiting, dysuria, and any other symptoms or complaints at this time.     MEDICAL HISTORY     Past Medical History:   Diagnosis Date    Acute deep  vein thrombosis (DVT) of proximal vein of left lower extremity (H)     Anxiety     Hidradenitis suppurativa     Infection due to 2019 novel coronavirus     Liver lesion 2023    Migraine with aura and without status migrainosus, not intractable 2023    Nephrolithiasis     Plantar fascial fibromatosis of right foot     Thyroid nodule 2023    Varicella        Past Surgical History:   Procedure Laterality Date    DILATION AND CURETTAGE SUCTION N/A 2023    Procedure: DILATION AND CURETTAGE, UTERUS, USING SUCTION;  Surgeon: Lulu Leone MD;  Location: [facility] OR    DILATION AND CURETTAGE SUCTION N/A 2023    Procedure: DILATION AND CURETTAGE, UTERUS, USING SUCTION;  Surgeon: Deborah Zurita MD;  Location: [facility] OR    EXCISE MASS FOOT Right 2023    Procedure: Excision and Neuroplasty of Right Plantar Foot Mass;  Surgeon: Hunter Redding MD;  Location: [facility] OR    [place] teeth extraction         Family History   Problem Relation Age of Onset    Thyroid Disease Mother     Obesity Mother     Hypertension Mother     Depression Mother     Diabetes Father     Nephrolithiasis Father         uric acid    Preeclampsia Sister     Diabetes Brother     Depression Brother     No Known Problems Brother     Depression Brother     Other - See Comments Maternal Grandmother         superficial blood clots    Dementia Maternal Grandfather     Heart Disease Maternal Grandfather     Dementia Paternal Grandmother     Diabetes Paternal Grandfather     Heart Failure Paternal Grandfather     Alzheimer Disease Paternal Grandfather      Birth Son     Other - See Comments Son         medically complex, multiple congenital anomalies    Pulmonary Embolism Paternal Uncle         fatal    Pulmonary Embolism Other         fatal    Anesthesia Reaction No family hx of        Social History     Tobacco Use    Smoking status: Never     Passive exposure: Never    Smokeless tobacco: Never   Vaping Use    Vaping Use: Never  "used   Substance Use Topics    Alcohol use: Yes     Alcohol/week: 1.0 - 2.0 standard drink of alcohol     Types: 1 - 2 Standard drinks or equivalent per week     Comment: 1-2 twice a week    Drug use: Never       acetaminophen (TYLENOL) 500 MG tablet  enoxaparin ANTICOAGULANT (LOVENOX) 40 MG/0.4ML syringe  polymixin b-trimethoprim (POLYTRIM) 76300-0.1 UNIT/ML-% ophthalmic solution  Prenatal Vit-DSS-Fe Cbn-FA (PRENATAL AD PO)  progesterone (PROMETRIUM) 200 MG capsule        PHYSICAL EXAM   VITALS:  Patient Vitals for the past 24 hrs:   BP Temp Temp src Pulse Resp SpO2 Height Weight   12/22/23 2015 -- -- -- -- -- 97 % -- --   12/22/23 1733 120/70 98.1  F (36.7  C) Temporal 85 16 99 % 1.778 m (5' 10\") 108.9 kg (240 lb)       CONSTITUTIONAL: Generally well-appearing female , in no acute distress. Well developed, nourished.  EYES: Conjunctivae clear, no scleral icterus,  EOM intact.  HENT:  Normocephalic, atraumatic. Normal neck range of motion, supple. Moist mucous membranes.   RESPIRATORY:  Respirations even, unlabored, in no acute respiratory distress.  CARDIOVASCULAR:  Regular rate and rhythm, good peripheral perfusion.  GI: Soft, nondistended, nontender. No palpable masses. No rebound tenderness or guarding.  No CVA tenderness.  MSK:  No edema. No cyanosis. Range of motion of major extremities intact.  INTEGUMENT: Warm, dry, No erythema. No rash.   NEURO: AxOx4. CN II-XII grossly intact, but not individually tested. No focal neurological deficits.   PSYCH: Thoughts linear and responses appropriate. Cooperative. Appropriate mood and affect.     LABS AND IMAGING   All pertinent labs reviewed and interpreted  Labs Ordered and Resulted from Time of ED Arrival to Time of ED Departure   CBC WITH PLATELETS - Abnormal       Result Value    WBC Count 11.2 (*)     RBC Count 4.84      Hemoglobin 12.3      Hematocrit 37.8      MCV 78      MCH 25.4 (*)     MCHC 32.5      RDW 15.7 (*)     Platelet Count 360     HCG QUANTITATIVE " PREGNANCY - Abnormal    hCG Quantitative 74,467 (*)    HEPATIC FUNCTION PANEL - Normal    Protein Total 7.1      Albumin 4.3      Bilirubin Total 0.2      Alkaline Phosphatase 63      AST 12      ALT 17      Bilirubin Direct <0.20     INR - Normal    INR 1.02     TYPE AND SCREEN, ADULT    ABO/RH(D) AB POS      Antibody Screen Negative      SPECIMEN EXPIRATION DATE [id number]     ABO/RH TYPE AND SCREEN       US OB <14 Weeks w Transvaginal Single   Final Result   IMPRESSION:    1.  Single living intrauterine gestation at 7 weeks 2 days, EDC 08/07/2024.   2.  Question a subchorionic hemorrhage with an atypical distribution versus a second abnormal gestational sac with debris.          ECG:  None.     PROCEDURES   None.      I, Aurora Andreina, am serving as a scribe to document services personally performed by Mindy Barton PA-C, based on my observation and the provider's statements to me. I, Mindy Barton PA-C attest that Aurora Andreina is acting in a scribe capacity, has observed my performance of the services and has documented them in accordance with my direction.     Mindy Barton PA-C   Emergency Medicine   [facility] EMERGENCY ROOM  [address] [id number] [phone]  Dept: [phone]       Mindy Barton PA-C  12/22/23 6821

## 2023-12-23 NOTE — ED PROVIDER NOTES
"Emergency Department Midlevel Supervisory Note     I personally saw the patient and performed a substantive portion of the visit including all aspects of the medical decision making.    ED Course:  6:27 PM Mindy Barton PA-C staffed patient with me. I agree with their assessment and plan of management, and I will see the patient.  7:02 PM I met with the patient to introduce myself, gather additional history, perform my initial exam, and discuss the plan.     Brief HPI:     Toma Blanco is a 32 year old female who presents for evaluation of vaginal bleeding during pregnancy. Patient is 7 weeks along. She is on Lovenox and ASA for a past medical history of blood clots. Patient reports a past medical history of two miscarriages within the past year and known subchronic hemorrhage.     I, Vee Cazares, am serving as a scribe to document services personally performed by Ludwin Roberts MD, based on my observations and the provider's statements to me.   I, Ludwin Roberts MD attest that Vee Cazares was acting in a scribe capacity, has observed my performance of the services and has documented them in accordance with my direction.    Brief Physical Exam: /70   Pulse 85   Temp 98.1  F (36.7  C) (Temporal)   Resp 16   Ht 1.778 m (5' 10\")   Wt 108.9 kg (240 lb)   LMP 07/07/2023   SpO2 97%   BMI 34.44 kg/m    Constitutional:  Alert, in no acute distress  EYES: Conjunctivae clear  HENT:  Atraumatic, normocephalic  Respiratory:  Respirations even, unlabored, in no acute respiratory distress  Cardiovascular:  Regular rate and rhythm, good peripheral perfusion  GI: Soft, nondistended, nontender, no palpable masses, no rebound, no guarding   Musculoskeletal:  No edema. No cyanosis. Range of motion major extremities intact.    Integument: Warm, Dry, No erythema, No rash.   Neurologic:  Alert & oriented, no focal deficits noted  Psych: Normal mood and affect     MDM:  Again, patient is " 32-year-old female who presents with vaginal bleeding in pregnancy.  Patient appears nontoxic with stable vital signs.  Patient is afebrile with no tachycardia or hypoxia, no increased work of breathing.  Per report, patient has unfortunately had multiple recurrent pregnancy losses, is currently on anticoagulation for history of DVT.  Abdomen is benign, denies any hematemesis, chest pain, lightheadedness or syncope.  Certainly concern for pregnancy loss, considered less likely ectopic, ovarian cyst or torsion, no reports of any fevers or vaginal discharge to suggest PID or STI.  We will obtain screening labs and ultrasound imaging.       1. Vaginal bleeding in pregnancy, first trimester    2. 7 weeks gestation of pregnancy        Labs and Imaging:  Results for orders placed or performed during the hospital encounter of 12/22/23   US OB <14 Weeks w Transvaginal Single    Impression    IMPRESSION:   1.  Single living intrauterine gestation at 7 weeks 2 days, EDC 08/07/2024.  2.  Question a subchorionic hemorrhage with an atypical distribution versus a second abnormal gestational sac with debris.   CBC with platelets   Result Value Ref Range    WBC Count 11.2 (H) 4.0 - 11.0 10e3/uL    RBC Count 4.84 3.80 - 5.20 10e6/uL    Hemoglobin 12.3 11.7 - 15.7 g/dL    Hematocrit 37.8 35.0 - 47.0 %    MCV 78 78 - 100 fL    MCH 25.4 (L) 26.5 - 33.0 pg    MCHC 32.5 31.5 - 36.5 g/dL    RDW 15.7 (H) 10.0 - 15.0 %    Platelet Count 360 150 - 450 10e3/uL   hCG Quantitative Pregnancy   Result Value Ref Range    hCG Quantitative 74,467 (H) <5 mIU/mL   Hepatic function panel   Result Value Ref Range    Protein Total 7.1 6.4 - 8.3 g/dL    Albumin 4.3 3.5 - 5.2 g/dL    Bilirubin Total 0.2 <=1.2 mg/dL    Alkaline Phosphatase 63 40 - 150 U/L    AST 12 0 - 45 U/L    ALT 17 0 - 50 U/L    Bilirubin Direct <0.20 0.00 - 0.30 mg/dL   Result Value Ref Range    INR 1.02 0.85 - 1.15   Adult Type and Screen   Result Value Ref Range    ABO/RH(D) AB POS      Antibody Screen Negative Negative    SPECIMEN EXPIRATION DATE [id number]      I have reviewed the relevant laboratory and radiology studies    Procedures:  I was present for the key portions of this procedure: none    Ludwin Roberts MD   [facility] EMERGENCY ROOM  [facility] [facility] [address]  [phone]       Ludwin Roberts MD  12/22/23 0863

## 2023-12-23 NOTE — DISCHARGE INSTRUCTIONS
Today you were seen in the emergency department for vaginal bleeding during pregnancy.  Your laboratory workup was reassuring with an elevated hCG (pregnancy indicator) and stable hemoglobin.  Your transvaginal ultrasound revealed a single living intrauterine gestation at 7 weeks and 2 days.  I spoke to OB/GYN (Lulu Carter MD) who recommends we discharge you home and have you follow-up in your your appointment.    In the meantime, you may experience continued bleeding as there is a subchorionic hemorrhage on ultrasound and you were currently anticoagulated.  OB/GYN recommends pelvic rest, so do not insert anything in your vagina or partake in vaginal intercourse.     Call 911 anytime you think you may need emergency care. For example, call if:    You passed out (lost consciousness).     You have severe vaginal bleeding. This means you are soaking through a pad each hour for 2 or more hours.     You have sudden, severe pain in your belly or pelvis.   Call your doctor now or seek immediate medical care if:    You have new or worse vaginal bleeding.     You are dizzy or lightheaded, or you feel like you may faint.     You have pain in your belly, pelvis, or lower back.     You think that you are in labor.     You have a sudden release of fluid from your vagina.     You've been having regular contractions for an hour. This means that you've had at least 8 contractions within 1 hour or at least 4 contractions within 20 minutes, even after you change your position and drink fluids.     You notice that your baby has stopped moving or is moving much less than normal.

## 2023-12-26 ENCOUNTER — TRANSCRIBE ORDERS (OUTPATIENT)
Dept: MATERNAL FETAL MEDICINE | Facility: CLINIC | Age: 33
End: 2023-12-26
Payer: COMMERCIAL

## 2023-12-26 DIAGNOSIS — O26.90 PREGNANCY RELATED CONDITION, ANTEPARTUM: Primary | ICD-10-CM

## 2024-01-02 ENCOUNTER — MYC MEDICAL ADVICE (OUTPATIENT)
Dept: ENDOCRINOLOGY | Facility: CLINIC | Age: 34
End: 2024-01-02

## 2024-01-02 DIAGNOSIS — Z31.69 ENCOUNTER FOR PRECONCEPTION CONSULTATION: Primary | ICD-10-CM

## 2024-01-02 DIAGNOSIS — Z86.718 PERSONAL HISTORY OF DVT (DEEP VEIN THROMBOSIS): Primary | ICD-10-CM

## 2024-01-04 ENCOUNTER — VIRTUAL VISIT (OUTPATIENT)
Dept: PHARMACY | Facility: CLINIC | Age: 34
End: 2024-01-04
Attending: OBSTETRICS & GYNECOLOGY
Payer: COMMERCIAL

## 2024-01-04 ENCOUNTER — VIRTUAL VISIT (OUTPATIENT)
Dept: ENDOCRINOLOGY | Facility: CLINIC | Age: 34
End: 2024-01-04
Attending: PHYSICIAN ASSISTANT
Payer: COMMERCIAL

## 2024-01-04 VITALS — WEIGHT: 240 LBS | BODY MASS INDEX: 34.44 KG/M2

## 2024-01-04 DIAGNOSIS — I82.4Y2 ACUTE DEEP VEIN THROMBOSIS (DVT) OF PROXIMAL VEIN OF LEFT LOWER EXTREMITY (H): ICD-10-CM

## 2024-01-04 DIAGNOSIS — O09.90 HIGH-RISK PREGNANCY, UNSPECIFIED TRIMESTER: Primary | ICD-10-CM

## 2024-01-04 DIAGNOSIS — E04.1 NODULAR THYROID DISEASE: ICD-10-CM

## 2024-01-04 PROCEDURE — 99204 OFFICE O/P NEW MOD 45 MIN: CPT | Mod: 95 | Performed by: INTERNAL MEDICINE

## 2024-01-04 PROCEDURE — 99207 PR NO CHARGE LOS: CPT | Mod: 93 | Performed by: PHARMACIST

## 2024-01-04 RX ORDER — SERTRALINE HYDROCHLORIDE 25 MG/1
25 TABLET, FILM COATED ORAL DAILY
COMMUNITY
Start: 2024-01-03 | End: 2024-01-25

## 2024-01-04 RX ORDER — CALCIUM CARB/VITAMIN D3/VIT K1 500-500-40
300 TABLET,CHEWABLE ORAL 2 TIMES DAILY
COMMUNITY
End: 2024-04-15

## 2024-01-04 RX ORDER — VITAMIN B COMPLEX
1 TABLET ORAL 2 TIMES DAILY
COMMUNITY

## 2024-01-04 RX ORDER — ASPIRIN 81 MG/1
81 TABLET ORAL DAILY
Status: ON HOLD | COMMUNITY
End: 2024-08-03

## 2024-01-04 NOTE — PROGRESS NOTES
"THIS IS A VIDEO VISIT:    Phone call visit/virtual visit encounter:    Name of patient: Toma Blanco    Date of encounter: 1/4/2024    Time of start of video visit: 9:56    Video started: 10:05    Video ended: 10:18    Provider location: working from home/ [facility]    Patient location: patients home.    Mode of transmission: [employer] [place] video/ [email or web address]    Verbal consent: obtained before starting visit. Pt is agreeable.      The patient has been notified of following:      \"This VIDEO visit will be conducted via a call between you and your physician/provider. We have found that certain health care needs can be provided without the need for a physical exam.  This service lets us provide the care you need with a short phone conversation.  If a prescription is necessary we can send it directly to your pharmacy.  If lab work is needed we can place an order for that and you can then stop by our lab to have the test done at a later time.     With new updates with corona virus patient might be billed as clinic visit.     If during the course of the call the physician/provider feels a telephone visit is not appropriate, you will not be charged for this service.\"      Past medical history, social history, family history, allergy and medications were reviewed and updated as appropriate.  Reviewed pertinent labs, notes, imaging studies personally.    Name: Toma Blanco  Seen in consultation with Magdalena Martin PA-C for thyroid nodules.   HPI:  Toma Blanco is a 33 year old female who presents for the evaluation of thyroid nodule.   has a past medical history of Acute deep vein thrombosis (DVT) of proximal vein of left lower extremity (H), Anxiety, Hidradenitis suppurativa, Infection due to 2019 novel coronavirus, Liver lesion (9/22/2023), Migraine with aura and without status migrainosus, not intractable (9/22/2023), Nephrolithiasis, Plantar fascial fibromatosis of right foot, Thyroid nodule " (9/22/2023), and Varicella.    6/2023-incidental finding of right thyroid nodule on upper extremity ultrasound.  7/2023-thyroid ultrasound--multiple thyroid nodules since.  2 dominant nodules are present on right side measuring 1.1 cm and 0.6 cm.  FNA was not needed.    No FH of thyroid cancer  Mother has goiter s/p thyroid surgery  No history of radiation  No compressive s/s  Thyroid labs in acceptable range.  She is not on thyroid hormone replacement.  No other major risk factors.    H/o miscarriages (X 2 times in 2023)  Currently 9 weeks pregnant.  She is one child.  Wondering if miscarriages are thyroid nodule related.  Labs today showed normal thyroid function test and negative TPO antibodies.    Palpitations:  h/o PVCs  Changes to hair or skin: h/o hidradenitis suppurativa  Diarrhea/Constipation:on and off  Changes in menses: pregnant  Dysphagia or Shortness of breath:No  Tremors:No  Changes in weight: stable.  Wt Readings from Last 2 Encounters:   01/04/24 108.9 kg (240 lb)   12/22/23 108.9 kg (240 lb)      PMH/PSH:  Past Medical History:   Diagnosis Date    Acute deep vein thrombosis (DVT) of proximal vein of left lower extremity (H)     post partum    Anxiety     Hidradenitis suppurativa     Infection due to 2019 novel coronavirus     Liver lesion 9/22/2023    Migraine with aura and without status migrainosus, not intractable 9/22/2023    Nephrolithiasis     Plantar fascial fibromatosis of right foot     Thyroid nodule 9/22/2023    Varicella      Past Surgical History:   Procedure Laterality Date    DILATION AND CURETTAGE SUCTION N/A 6/2/2023    Procedure: DILATION AND CURETTAGE, UTERUS, USING SUCTION;  Surgeon: Lulu Leone MD;  [facility]    DILATION AND CURETTAGE SUCTION N/A 8/30/2023    Procedure: DILATION AND CURETTAGE, UTERUS, USING SUCTION;  Surgeon: Deborah Zurita MD;  [facility]: [facility] OR    EXCISE MASS FOOT Right 01/17/2023    Procedure: Excision and Neuroplasty of Right Plantar Foot  Mass;  Surgeon: Hunter Redding MD;  Location: [facility] OR    [place] teeth extraction       Family Hx:  Family History   Problem Relation Age of Onset    Thyroid Disease Mother     Obesity Mother     Hypertension Mother     Depression Mother     Diabetes Father     Nephrolithiasis Father         uric acid    Preeclampsia Sister     Diabetes Brother     Depression Brother     No Known Problems Brother     Depression Brother     Other - See Comments Maternal Grandmother         superficial blood clots    Dementia Maternal Grandfather     Heart Disease Maternal Grandfather     Dementia Paternal Grandmother     Diabetes Paternal Grandfather     Heart Failure Paternal Grandfather     Alzheimer Disease Paternal Grandfather      Birth Son     Other - See Comments Son         medically complex, multiple congenital anomalies    Pulmonary Embolism Paternal Uncle         fatal    Pulmonary Embolism Other         fatal    Anesthesia Reaction No family hx of                Social Hx:  Social History     Socioeconomic History    Marital status:      Spouse name: Not on file    Number of children: Not on file    Years of education: Not on file    Highest education level: Not on file   Occupational History    Not on file   Tobacco Use    Smoking status: Never     Passive exposure: Never    Smokeless tobacco: Never   Vaping Use    Vaping Use: Never used   Substance and Sexual Activity    Alcohol use: Yes     Alcohol/week: 1.0 - 2.0 standard drink of alcohol     Types: 1 - 2 Standard drinks or equivalent per week     Comment: 1-2 twice a week    Drug use: Never    Sexual activity: Yes     Partners: Male   Other Topics Concern    Not on file   Social History Narrative    Not on file     Social Determinants of Health     Financial Resource Strain: Low Risk  (2023)    Financial Resource Strain     Within the past 12 months, have you or your family members you live with been unable to get utilities (heat, electricity) when  it was really needed?: No   Food Insecurity: Low Risk  (9/21/2023)    Food Insecurity     Within the past 12 months, did you worry that your food would run out before you got money to buy more?: No     Within the past 12 months, did the food you bought just not last and you didn t have money to get more?: No   Transportation Needs: Low Risk  (9/21/2023)    Transportation Needs     Within the past 12 months, has lack of transportation kept you from medical appointments, getting your medicines, non-medical meetings or appointments, work, or from getting things that you need?: No   Physical Activity: Not on file   Stress: Not on file   Social Connections: Not on file   Interpersonal Safety: Low Risk  (9/22/2023)    Interpersonal Safety     Do you feel physically and emotionally safe where you currently live?: Yes     Within the past 12 months, have you been hit, slapped, kicked or otherwise physically hurt by someone?: No     Within the past 12 months, have you been humiliated or emotionally abused in other ways by your partner or ex-partner?: No   Housing Stability: Low Risk  (9/21/2023)    Housing Stability     Do you have housing? : Yes     Are you worried about losing your housing?: No          MEDICATIONS:  has a current medication list which includes the following prescription(s): acetaminophen, enoxaparin anticoagulant, prenatal vit-dss-fe cbn-fa, and progesterone.    Review of Systems  10 point ROS neg other than the symptoms noted above in the HPI.    Physical Exam   VS: Wt 108.9 kg (240 lb)   LMP 07/07/2023   BMI 34.44 kg/m    GENERAL: healthy, alert and no distress  EYES: Eyes grossly normal to inspection, conjunctivae and sclerae normal  ENT: no nose swelling, nasal discharge.  Thyroid: no apparent thyroid nodules  RESP: no audible wheeze, cough, or visible cyanosis.  No visible retractions or increased work of breathing.  Able to speak fully in complete sentences.  ABDO: not evaluated.  EXTREMITIES: no  hand tremors.  NEURO: Cranial nerves grossly intact, mentation intact and speech normal  SKIN: No apparent skin lesions, rash or edema seen   PSYCH: mentation appears normal, affect normal/bright, judgement and insight intact, normal speech and appearance well-groomed    LABS:  TFTs:  TSH   Date Value Ref Range Status   07/24/2023 1.29 0.30 - 4.20 uIU/mL Final       Thyroid Ultrasound 7/2023:  IMPRESSION:  1.  Multiple small thyroid nodules and 2 dominant nodules in the right lobe, both of which are too small for FNA and sonographic follow-up is recommended as outlined above.    All pertinent notes, labs, and images personally reviewed by me.     A/P  Ms.Anne LONNY Blanco is a 33 year old here for the evaluation of thyroid nodule:  #1 Thyroid Nodule:  Currently pregnant.  7/2023 ultrasound as noted above showing 2 small nodules on the right side.  No FH of thyroid cancer  No history of radiation  No compressive s/s  Thyroid labs in acceptable range.  She is not on thyroid hormone replacement.  No other major risk factors.   Plan:  Discussed diagnosis, pathophysiology, management and treatment options of condition with pt.  Based on 7/2023 ultrasound showing 2 small nodules on the right side, fine-needle aspiration biopsy is not needed.  Recommend close follow-up and repeat ultrasound 7/2024.  Discussed compressive symptoms to monitor.  Also discussed that as of thyroid labs are in normal range and TPO antibodies are negative, it is less likely that her miscarriage was secondary to thyroid problem.  Thyroid nodules are nonsecreting in this case as hormone levels are in normal range.      Discussed possible outcomes of biopsy including possible benign, possible malignancy and possible AUS. If AUS indication for molecular marker testing.  Discussed possible compressive symptoms and signs to watch for.  Discussed that Thyroid nodules are common and are frequently benign. Data suggest that the prevalence of palpable  thyroid nodules is 3% to 7% in [place]; the prevalence is as high as 50% based on ultrasonography (US) or autopsy data. All patients with a palpable thyroid nodule, however, should undergo US examination. US-guided FNA (US-FNA) is recommended for nodules ?10 mm; US-FNA is suggested for nodules <10 mm only if clinical information or US features are suspicious.  The frequency of thyroid nodules in general population was discussed. Also discussed possibility of malignancy, potential for thyroid autonomy.     Causes of thyroid Nodules: Benign (Multinodular goiter, Hashimoto s thyroiditis, Simple or hemorrhagic cysts, Follicular adenomas, Subacute thyroiditis) or Malignant(Papillary carcinoma, Follicular carcinoma, Hürthle cell carcinoma, Medullary carcinoma, Anaplastic carcinoma, Primary thyroid lymphoma, or Metastatic malignant lesion).    MultiNodule:  The risk of cancer is not significantly higher in palpable solitary thyroid nodules than in multinodular lesions or in nodules in diffuse goiters. In multinodular thyroid glands, the cytologic sampling should be focused on lesions characterized by suspicious US features rather than on larger or clinically dominant nodules.    Cyst:  Most complex thyroid nodules with a dominant fluid component are benign. USFNA, however, should always be performed because the rare papillary thyroid carcinoma (PTC) can be cystic. An unsatisfactory (nondiagnostic) specimen usually results from a cystic nodule that yields few or no follicular cells. Reaspiration yields satisfactory results in 50% of cases.    All questions were answered.  The patient indicates understanding of the above issues and agrees with the plan set forth.    Follow-up:  As noted in AVS    Nasreen Cordero MD  Endocrinology   [facility]    Cc: Alesia Harris    Addendum to above note and clinic visit:    Labs reviewed.    See result note/telephone encounter.

## 2024-01-04 NOTE — LETTER
"    1/4/2024         RE: Toma Blanco  [address]  [place] [address]        Dear Colleague,    Thank you for referring your patient, Toma Blanco, to the [facility]. Please see a copy of my visit note below.    THIS IS A VIDEO VISIT:    Phone call visit/virtual visit encounter:    Name of patient: Toma Blanco    Date of encounter: 1/4/2024    Time of start of video visit: 9:56    Video started: 10:05    Video ended: 10:18    Provider location: working from home/ [facility]    Patient location: patients home.    Mode of transmission: [employer] video/ [employer]    Verbal consent: obtained before starting visit. Pt is agreeable.      The patient has been notified of following:      \"This VIDEO visit will be conducted via a call between you and your physician/provider. We have found that certain health care needs can be provided without the need for a physical exam.  This service lets us provide the care you need with a short phone conversation.  If a prescription is necessary we can send it directly to your pharmacy.  If lab work is needed we can place an order for that and you can then stop by our lab to have the test done at a later time.     With new updates with corona virus patient might be billed as clinic visit.     If during the course of the call the physician/provider feels a telephone visit is not appropriate, you will not be charged for this service.\"      Past medical history, social history, family history, allergy and medications were reviewed and updated as appropriate.  Reviewed pertinent labs, notes, imaging studies personally.    Name: Toma Blanco  Seen in consultation with Magdalena Martin PA-C for thyroid nodules.   HPI:  Toma Blanco is a 33 year old female who presents for the evaluation of thyroid nodule.   has a past medical history of Acute deep vein thrombosis (DVT) of proximal vein of left lower extremity (H), Anxiety, " Hidradenitis suppurativa, Infection due to 2019 novel coronavirus, Liver lesion (9/22/2023), Migraine with aura and without status migrainosus, not intractable (9/22/2023), Nephrolithiasis, Plantar fascial fibromatosis of right foot, Thyroid nodule (9/22/2023), and Varicella.    6/2023-incidental finding of right thyroid nodule on upper extremity ultrasound.  7/2023-thyroid ultrasound--multiple thyroid nodules since.  2 dominant nodules are present on right side measuring 1.1 cm and 0.6 cm.  FNA was not needed.    No FH of thyroid cancer  Mother has goiter s/p thyroid surgery  No history of radiation  No compressive s/s  Thyroid labs in acceptable range.  She is not on thyroid hormone replacement.  No other major risk factors.    H/o miscarriages (X 2 times in 2023)  Currently 9 weeks pregnant.  She is one child.  Wondering if miscarriages are thyroid nodule related.  Labs today showed normal thyroid function test and negative TPO antibodies.    Palpitations:  h/o PVCs  Changes to hair or skin: h/o hidradenitis suppurativa  Diarrhea/Constipation:on and off  Changes in menses: pregnant  Dysphagia or Shortness of breath:No  Tremors:No  Changes in weight: stable.  Wt Readings from Last 2 Encounters:   01/04/24 108.9 kg (240 lb)   12/22/23 108.9 kg (240 lb)      PMH/PSH:  Past Medical History:   Diagnosis Date     Acute deep vein thrombosis (DVT) of proximal vein of left lower extremity (H)     post partum     Anxiety      Hidradenitis suppurativa      Infection due to 2019 novel coronavirus      Liver lesion 9/22/2023     Migraine with aura and without status migrainosus, not intractable 9/22/2023     Nephrolithiasis      Plantar fascial fibromatosis of right foot      Thyroid nodule 9/22/2023     Varicella      Past Surgical History:   Procedure Laterality Date     DILATION AND CURETTAGE SUCTION N/A 6/2/2023    Procedure: DILATION AND CURETTAGE, UTERUS, USING SUCTION;  Surgeon: Lulu Leone MD;  Location:   OR     DILATION AND CURETTAGE SUCTION N/A 2023    Procedure: DILATION AND CURETTAGE, UTERUS, USING SUCTION;  Surgeon: Deborah Zurita MD;  [facility]     EXCISE MASS FOOT Right 2023    Procedure: Excision and Neuroplasty of Right Plantar Foot Mass;  Surgeon: Hunter Redding MD;  [facility] OR     [place] teeth extraction       Family Hx:  Family History   Problem Relation Age of Onset     Thyroid Disease Mother      Obesity Mother      Hypertension Mother      Depression Mother      Diabetes Father      Nephrolithiasis Father         uric acid     Preeclampsia Sister      Diabetes Brother      Depression Brother      No Known Problems Brother      Depression Brother      Other - See Comments Maternal Grandmother         superficial blood clots     Dementia Maternal Grandfather      Heart Disease Maternal Grandfather      Dementia Paternal Grandmother      Diabetes Paternal Grandfather      Heart Failure Paternal Grandfather      Alzheimer Disease Paternal Grandfather       Birth Son      Other - See Comments Son         medically complex, multiple congenital anomalies     Pulmonary Embolism Paternal Uncle         fatal     Pulmonary Embolism Other         fatal     Anesthesia Reaction No family hx of                Social Hx:  Social History     Socioeconomic History     Marital status:      Spouse name: Not on file     Number of children: Not on file     Years of education: Not on file     Highest education level: Not on file   Occupational History     Not on file   Tobacco Use     Smoking status: Never     Passive exposure: Never     Smokeless tobacco: Never   Vaping Use     Vaping Use: Never used   Substance and Sexual Activity     Alcohol use: Yes     Alcohol/week: 1.0 - 2.0 standard drink of alcohol     Types: 1 - 2 Standard drinks or equivalent per week     Comment: 1-2 twice a week     Drug use: Never     Sexual activity: Yes     Partners: Male   Other Topics Concern     Not  on file   Social History Narrative     Not on file     Social Determinants of Health     Financial Resource Strain: Low Risk  (9/21/2023)    Financial Resource Strain      Within the past 12 months, have you or your family members you live with been unable to get utilities (heat, electricity) when it was really needed?: No   Food Insecurity: Low Risk  (9/21/2023)    Food Insecurity      Within the past 12 months, did you worry that your food would run out before you got money to buy more?: No      Within the past 12 months, did the food you bought just not last and you didn t have money to get more?: No   Transportation Needs: Low Risk  (9/21/2023)    Transportation Needs      Within the past 12 months, has lack of transportation kept you from medical appointments, getting your medicines, non-medical meetings or appointments, work, or from getting things that you need?: No   Physical Activity: Not on file   Stress: Not on file   Social Connections: Not on file   Interpersonal Safety: Low Risk  (9/22/2023)    Interpersonal Safety      Do you feel physically and emotionally safe where you currently live?: Yes      Within the past 12 months, have you been hit, slapped, kicked or otherwise physically hurt by someone?: No      Within the past 12 months, have you been humiliated or emotionally abused in other ways by your partner or ex-partner?: No   Housing Stability: Low Risk  (9/21/2023)    Housing Stability      Do you have housing? : Yes      Are you worried about losing your housing?: No          MEDICATIONS:  has a current medication list which includes the following prescription(s): acetaminophen, enoxaparin anticoagulant, prenatal vit-dss-fe cbn-fa, and progesterone.    Review of Systems  10 point ROS neg other than the symptoms noted above in the HPI.    Physical Exam   VS: Wt 108.9 kg (240 lb)   LMP 07/07/2023   BMI 34.44 kg/m    GENERAL: healthy, alert and no distress  EYES: Eyes grossly normal to  inspection, conjunctivae and sclerae normal  ENT: no nose swelling, nasal discharge.  Thyroid: no apparent thyroid nodules  RESP: no audible wheeze, cough, or visible cyanosis.  No visible retractions or increased work of breathing.  Able to speak fully in complete sentences.  ABDO: not evaluated.  EXTREMITIES: no hand tremors.  NEURO: Cranial nerves grossly intact, mentation intact and speech normal  SKIN: No apparent skin lesions, rash or edema seen   PSYCH: mentation appears normal, affect normal/bright, judgement and insight intact, normal speech and appearance well-groomed    LABS:  TFTs:  TSH   Date Value Ref Range Status   07/24/2023 1.29 0.30 - 4.20 uIU/mL Final       Thyroid Ultrasound 7/2023:  IMPRESSION:  1.  Multiple small thyroid nodules and 2 dominant nodules in the right lobe, both of which are too small for FNA and sonographic follow-up is recommended as outlined above.    All pertinent notes, labs, and images personally reviewed by me.     A/P  Ms.Anne LONNY Blanco is a 33 year old here for the evaluation of thyroid nodule:  #1 Thyroid Nodule:  Currently pregnant.  7/2023 ultrasound as noted above showing 2 small nodules on the right side.  No FH of thyroid cancer  No history of radiation  No compressive s/s  Thyroid labs in acceptable range.  She is not on thyroid hormone replacement.  No other major risk factors.   Plan:  Discussed diagnosis, pathophysiology, management and treatment options of condition with pt.  Based on 7/2023 ultrasound showing 2 small nodules on the right side, fine-needle aspiration biopsy is not needed.  Recommend close follow-up and repeat ultrasound 7/2024.  Discussed compressive symptoms to monitor.  Also discussed that as of thyroid labs are in normal range and TPO antibodies are negative, it is less likely that her miscarriage was secondary to thyroid problem.  Thyroid nodules are nonsecreting in this case as hormone levels are in normal range.      Discussed possible  outcomes of biopsy including possible benign, possible malignancy and possible AUS. If AUS indication for molecular marker testing.  Discussed possible compressive symptoms and signs to watch for.  Discussed that Thyroid nodules are common and are frequently benign. Data suggest that the prevalence of palpable thyroid nodules is 3% to 7% in [place]; the prevalence is as high as 50% based on ultrasonography (US) or autopsy data. All patients with a palpable thyroid nodule, however, should undergo US examination. US-guided FNA (US-FNA) is recommended for nodules =10 mm; US-FNA is suggested for nodules <10 mm only if clinical information or US features are suspicious.  The frequency of thyroid nodules in general population was discussed. Also discussed possibility of malignancy, potential for thyroid autonomy.     Causes of thyroid Nodules: Benign (Multinodular goiter, Hashimoto s thyroiditis, Simple or hemorrhagic cysts, Follicular adenomas, Subacute thyroiditis) or Malignant(Papillary carcinoma, Follicular carcinoma, Hürthle cell carcinoma, Medullary carcinoma, Anaplastic carcinoma, Primary thyroid lymphoma, or Metastatic malignant lesion).    MultiNodule:  The risk of cancer is not significantly higher in palpable solitary thyroid nodules than in multinodular lesions or in nodules in diffuse goiters. In multinodular thyroid glands, the cytologic sampling should be focused on lesions characterized by suspicious US features rather than on larger or clinically dominant nodules.    Cyst:  Most complex thyroid nodules with a dominant fluid component are benign. USFNA, however, should always be performed because the rare papillary thyroid carcinoma (PTC) can be cystic. An unsatisfactory (nondiagnostic) specimen usually results from a cystic nodule that yields few or no follicular cells. Reaspiration yields satisfactory results in 50% of cases.    All questions were answered.  The patient indicates understanding of  the above issues and agrees with the plan set forth.    Follow-up:  As noted in AVS    Nasreen Cordero MD  Endocrinology   [facility]    Cc: Alesia Harris    Addendum to above note and clinic visit:    Labs reviewed.    See result note/telephone encounter.          Again, thank you for allowing me to participate in the care of your patient.        Sincerely,        Nasreen Cordero MD

## 2024-01-04 NOTE — PROGRESS NOTES
Disease State Management Encounter:                          Toma Blanco is a 33 year old female called for an initial visit. She was referred to me from [facility].     Reason for visit: review medications in pregnancy.    Hx DVT:   Lovenox 40mg daily - doing well with injections, no side effects or concerns.   Plan per hematology notes:  She will proceed with Lovenox at prophylactic intensity 40mg every day, progesterone and alpha lipoic acid starting at ovulation while TTC per her [facility] provider. She will call if she has a positive pregnancy test. She will require closer ultrasound monitoring with pregnancies to monitor for subchorionic hemorrhage per [facility]. If she were to have miscarriage and require D&C, she will call if having excessive bleeding to discuss Lovenox hold and plans for procedures. Would require anticoagulation after any prolonged IV placement for superficial vein thrombosis prevention in setting of miscarriage.     Mood:  Planning to start sertraline 25mg daily for mood concerns.  She has never taken an antidepressant before and had questions about what to expect.    Today's Vitals: LMP 2023     Assessment/Plan:    Education provided on benefits and risks of antidepressants during pregnancy.  Antidepressants likely do not increase risk of birth defects. Antidepressants when used in the 3rd trimester likely do increase the risk of  withdrawal. Encouraged taking lowest dose needed to control symptoms.       Follow-up: as needed    I spent 15 minutes with this patient today.  A copy of the visit note was provided to the patient's provider(s).    A summary of these recommendations was sent via [employer].    Lupe Morrissey, PharmD, BCACP   Medication Therapy Management Pharmacist   [facility]  [phone]        Medication Therapy Recommendations  No medication therapy recommendations to display

## 2024-01-04 NOTE — NURSING NOTE
Is the patient currently in the state of [place]? YES    Visit mode:VIDEO    If the visit is dropped, the patient can be reconnected by: VIDEO VISIT: Text to cell phone:   Telephone Information:   Mobile [phone]       Will anyone else be joining the visit? NO  (If patient encounters technical issues they should call [phone]    How would you like to obtain your AVS? MyChart    Are changes needed to the allergy or medication list? No    Reason for visit: Video Visit and Consult    Neris PRETTY

## 2024-01-04 NOTE — PATIENT INSTRUCTIONS
[facility]  Dr Cordero, Endocrinology Department    [facility]   [address]  [place] [address]  Appointment [phone]  Fax: [phone]  [place]: Monday - Thursday      Consider thyroid labs check during pregnancy with OB/GYN.  Thyroid ultrasound in 2024.  Follow up after that.  (Recommend in person visit)     [facility] radiology scheduleing   [place]  [phone]   [facility] Radiology scheduling  [place]  [phone]     Please call and schedule the recommended test as discussed in clinic visit. These are the numbers to call.

## 2024-01-05 NOTE — PATIENT INSTRUCTIONS
Recommendations from today's disease management visit:                                                      We reviewed your medications and supplements in pregnancy.  No concerns noted - we talked primarily about starting sertraline.  No concerns with the medication in terms of growth and development of the fetus. We reviewed side effects, commonly nausea.    Sertraline and other antidepressants can increase risk of  withdrawal symptoms at delivery - these symptoms are typically mild, lasting for a couple days, and present as jitteriness, fussiness, difficulty feeding or sleeping. I recommend keeping your dose of sertraline as low as needed to get good stability of your mental health symptoms. Let me know if you have further questions!  Here's a good website for additional medication info:  [email or web address]    Follow-up: as needed    To schedule another MTM appointment, please call the clinic directly or you may call the MTM scheduling line at [phone] or toll-free at [phone].     My Clinical Pharmacist's contact information:                                                      Please feel free to contact me with any questions or concerns you have.      Lupe Morrissey, PharmD, BCACP   Medication Therapy Management Pharmacist   [facility]  [phone]

## 2024-01-17 ENCOUNTER — TRANSFERRED RECORDS (OUTPATIENT)
Dept: HEALTH INFORMATION MANAGEMENT | Facility: CLINIC | Age: 34
End: 2024-01-17

## 2024-01-18 ENCOUNTER — PRE VISIT (OUTPATIENT)
Dept: MATERNAL FETAL MEDICINE | Facility: CLINIC | Age: 34
End: 2024-01-18

## 2024-01-18 PROBLEM — O35.FXX0 OMPHALOCELE OF FETUS IN SINGLETON PREGNANCY, ANTEPARTUM: Status: RESOLVED | Noted: 2021-01-19 | Resolved: 2024-01-18

## 2024-01-23 NOTE — PROGRESS NOTES
Maternal-Fetal Medicine Consultation    Toma Blanco  : 1990  MRN: [id number]    REFERRAL:  Toma Blanco is a 33 year old sent by Dr. Chao from OB clinic for MFM consultation.    HPI:  Toma Blanco is a 33 year old  at 12w0d by LMP consistent with 5w6d US here for MFM consultation regarding hx of  delivery with multiple fetal anomalies, and hx of DVT now on ppx lovenox. .    Her past medical history is significant for a deep vein thrombosis (DVT) of proximal vein of left lower extremity. In , she developed an acute DVT of her left lower extremity post partum in 2021 and was anticoagulated immediately after. She does have family history of PE in a paternal uncle and DVT in her maternal grandmother.     She was seen in hematology clinic on 2023, where extensive evaluation was completed.  Testing for inherited thrombophilias was negative.  Antiphospholipid antibody testing was notable for mildly elevated anticardiolipin that was not clinically significant, otherwise negative testing.  She was counseled thoroughly about history of prior DVT and pregnancy and was started on prophylactic Lovenox.  Patient regularly takes her Lovenox with no issue.  No concerns about her hematologic status at this time.    Her question for today is related to past history of  delivery with fetus with multiple anomalies.  She is concerned about risk of recurrence and strategies for follow-up.    Otherwise healthy following with primary OB    Prenatal Care:  Primary OB care this pregnancy has been with Dr. Chao  from OB clinic.    Obstetrics History:  OB History    Para Term  AB Living   4 1 0 1 2 1   SAB IAB Ectopic Multiple Live Births   1 0 0 0 1      # Outcome Date GA Lbr Mikie/2nd Weight Sex Delivery Anes PTL Lv   4 Current            3 AB 23 6w2d    SAB         Birth Comments: DEBO/twin pregnancy   2 SAB 23 6w0d    AB, INEVITAB         Birth  Comments: DEBO, D&C   1  21 33w0d 08:50 / 00:11 1.49 kg (3 lb 4.6 oz) M Vag-Vacuum  Y YVONNE      Birth Comments: polyhydramnios      Complications: Polyhydramnios      Name: MAGALYS NANCE      Apgar1: 6  Apgar5: 9      Obstetric Comments   First baby: mult anomalies, poly, PTL, FGR; no genetic positive test, was on progesterone injectable first months of pregnancy       Gynecologic History:  - Denies any history of abnormal pap smears  - Denies prior cervical surgery or procedures  - Denies any history of frequent UTIs, vaginal infections, or STIs    Past Medical History:  Past Medical History:   Diagnosis Date    Acute deep vein thrombosis (DVT) of proximal vein of left lower extremity (H)     post partum    Anxiety     Hidradenitis suppurativa     Infection due to 2019 novel coronavirus     Liver lesion 2023    Migraine with aura and without status migrainosus, not intractable 2023    Migraine with aura and without status migrainosus, not intractable 2023    Nephrolithiasis     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, antepartum     Plantar fascial fibromatosis of right foot     Thyroid nodule 2023    Varicella        Past Surgical History:  Past Surgical History:   Procedure Laterality Date    DILATION AND CURETTAGE SUCTION N/A 2023    Procedure: DILATION AND CURETTAGE, UTERUS, USING SUCTION;  Surgeon: Lulu Leone MD;  Location: [facility] OR    DILATION AND CURETTAGE SUCTION N/A 2023    Procedure: DILATION AND CURETTAGE, UTERUS, USING SUCTION;  Surgeon: Deborah Zurita MD;  Location: [facility] OR    EXCISE MASS FOOT Right 2023    Procedure: Excision and Neuroplasty of Right Plantar Foot Mass;  Surgeon: Hunter Redding MD;  Location: [facility] OR    [place] teeth extraction         Current Medications:  Prior to Admission medications    Medication Sig Last Dose Taking? Auth Provider Long Term End Date   acetaminophen (TYLENOL) 500 MG tablet Take 500-1,000 mg by  mouth every 8 hours as needed for mild pain   Unknown, Entered By History No    Alpha-Lipoic Acid 200 MG TABS Take 300 mg by mouth 2 times daily   Reported, Patient     aspirin 81 MG EC tablet Take 81 mg by mouth daily   Reported, Patient     enoxaparin ANTICOAGULANT (LOVENOX) 40 MG/0.4ML syringe Inject 0.4 mLs (40 mg) Subcutaneous daily   Magdalena Haley PA-C No    MAGNESIUM GLYCINATE PO Take 400 mg by mouth daily   Reported, Patient     Prenatal Vit-DSS-Fe Cbn-FA (PRENATAL AD PO) Take 1 tablet by mouth daily   Reported, Patient     progesterone (PROMETRIUM) 200 MG capsule Place 400 mg vaginally 2 times daily   Reported, Patient No    sertraline (ZOLOFT) 25 MG tablet Take 25 mg by mouth daily   Reported, Patient Yes    Vitamin D3 (VITAMIN D, CHOLECALCIFEROL,) 25 mcg (1000 units) tablet Take 1 tablet by mouth 2 times daily   Reported, Patient         Allergies:  Patient has no known allergies.    Social History:   Occupation: historian  Status: employed  Denies use of alcohol, drugs or smoking.    Family History:  Denies history of genetic disorders, preeclampsia, bleeding disorders,    ROS:  10-point ROS negative except as in HPI     PHYSICAL EXAM:  Deferred     Other Imaging:   EXAM: US OB <14 WEEKS WITH TRANSVAGINAL SINGLE  LOCATION: [facility]  DATE: 12/22/2023     INDICATION: Vaginal bleeding, + preg.  COMPARISON: None.  TECHNIQUE: Transabdominal scans were performed. Endovaginal ultrasound was performed to better visualize the embryo.     FINDINGS:  UTERUS: Single normal appearing intrauterine gestation sac.  CRL: Measures 1.1 cm, equals 7 weeks 2 days.  FETAL HEART RATE: 144 bpm.  AMNIOTIC FLUID: Normal.  PLACENTA: Not yet formed. No evidence for sub-chorionic hemorrhage.     There is an additional area with cystic and solid components, possibly a subchorionic hemorrhage measuring 2.8 x 3.5 x 2.0 cm. This is somewhat atypical distribution of hemorrhage and this could be a  second abnormal gestational sac as well.     RIGHT OVARY: Simple cyst.  LEFT OVARY: Normal.                                                                      IMPRESSION:   1.  Single living intrauterine gestation at 7 weeks 2 days, EDC 08/07/2024.  2.  Question a subchorionic hemorrhage with an atypical distribution versus a second abnormal gestational sac with debris.        Component      Latest Ref Rng 8/29/2023  8:30 AM 9/22/2023  4:04 PM 11/2/2023  9:41 AM   INR      0.85 - 1.15    1.00    Thrombin Time      13.0 - 19.0 Seconds   17.2    PTT Ratio      <1.21    1.06    DRVVT Screen Ratio      <1.08    0.90    Lupus Result      Negative    Negative    Lupus Interpretation   The INR is normal.     Cardiolipin Ann IgG Instrument Value      <10.0 GPL-U/mL   14.0 (H)    Cardiolipin IgG Ann      Negative    Weak Positive !    Cardiolipin Ann IgM Instrument Value      <10.0 MPL-U/mL   5.4    Cardiolipin IgM Ann      Negative    Negative    Thyroid Peroxidase Antibody      <35 IU/mL <10      T4 Free      0.90 - 1.70 ng/dL 1.30      Triiodothyronine (T3)      85 - 202 ng/dL 134      Thyroglobulin Antibody      <40 IU/mL <20      Vitamin B12      232 - 1,245 pg/mL  333     Magnesium      1.7 - 2.3 mg/dL  2.2     Vitamin D Deficiency screening      20 - 75 ug/L  26     Phosphorus      2.5 - 4.5 mg/dL  3.2     Beta 2 Glycoprotein 1 Antibody IgG      <7.0 U/mL   1.3    Beta 2 Glycoprotein 1 Antibody IgM      <7.0 U/mL   2.9    Factor 8 Assay      55 - 200 %   186      Component      Latest Ref Rng 12/5/2023  9:06 AM 12/7/2023  9:13 AM 12/22/2023  6:01 PM   WBC      4.0 - 11.0 10e3/uL   11.2 (H)    RBC Count      3.80 - 5.20 10e6/uL   4.84    Hemoglobin      11.7 - 15.7 g/dL   12.3    Hematocrit      35.0 - 47.0 %   37.8    MCV      78 - 100 fL   78    MCH      26.5 - 33.0 pg   25.4 (L)    MCHC      31.5 - 36.5 g/dL   32.5    RDW      10.0 - 15.0 %   15.7 (H)    Platelet Count      150 - 450 10e3/uL   360    Protein  Total      6.4 - 8.3 g/dL   7.1    Albumin      3.5 - 5.2 g/dL   4.3    Bilirubin Total      <=1.2 mg/dL   0.2    Alkaline Phosphatase      40 - 150 U/L   63    AST      0 - 45 U/L   12    ALT      0 - 50 U/L   17    Bilirubin Direct      0.00 - 0.30 mg/dL   <0.20    INR      0.85 - 1.15    1.02    ABO/Rh(D)   AB POS    Antibody Screen      Negative    Negative       Legend:  (H) High  ! Abnormal  (L) Low    ASSESSMENT/PLAN:  Toma Blanco is a 33 year old  at 12w0d by LMP c/w 5w6d US here for MFM consultation regarding hx of  PTD, multiple fetal anomalies and hx of DVT now on ppx lovenox.      History of  delivery and multiple fetal anomalies     delivery is defined as any delivery prior to 37 weeks gestation. The majority (70-80%) of  deliveries are spontaneous and secondary to  labor or PPROM. Rarely  birth is secondary to cervical insufficiency and painless cervical dilation. Risk factors for  birth include historical, maternal, fetal, and pregnancy characteristics.  Often times Complications and morbidity from  birth are most related to gestational age and fetal maturity at the time of delivery.  Toma's  delivery is most likely secondary to her history of polyhydramnios in her prior pregnancy. Her first baby was diagnosed  with multiple fetal anomalies and severe fetal growth restriction.  We discussed given the past history of  delivery likely related to polyhydramnios that her risk of  delivery is likely slightly higher than the general population however given there is a likely inciting diagnosis her risk of  delivery is less than those with unexplained etiologies.    A complete genetic workup was completed including exome testing for genetic differences.  No significant findings were noted. We discussed with the patient that in the setting of multiple fetal anomalies with normal exome testing the risk of  recurrence is low.  We discussed reviewing detailed anatomy scan, if normal this would be a reassuring sign. No further genetic testing was requested by the patient, she has met with genetic counselors at [facility] and has been counseled appropriately.     History of DVT     Pregnant and postpartum women have up to a five-fold increased risk for venous thromboembolism (VTE); they have higher rates of stasis, hypercoagulability and vascular damage ([place]chow Triad). Decreased mobility may also play a role.   The prevention of deep vein thrombosis (DVT) is paramount because pulmonary embolism (PE) is a leading cause of maternal death, responsible for 9% of maternal deaths.  The most important risk factor for VTE in pregnancy is a personal history of VTE, and up to 25% of cases of VTE in pregnancy are recurrent events.  Workup for this patient was not suggestive of inherited thrombophilia.  Antiphospholipid syndrome testing was negative.  Patient had weak positive testing for anticardiolipin however did not meet threshold for significance.  Women with a history of VTE that is estrogen or pregnancy related or was idiopathic (i.e. unprovoked), in the absence of a thrombophilia, are managed with prophylactic anticoagulation during pregnancy and for 6 weeks postpartum.  For most patients enoxaparin 40 mg daily is sufficient.      Most expert recommend waiting 12 hours after a prophylactic dose of LMWH before placing regional anesthesia.  Similarly, a dose should be given no sooner than four hours after removal of an epidural and 12 hours after the regional anesthesia placement.  The exact timing should be discussed with the anesthesiologist performing the procedure. From an obstetrics stand point, assuming no significant bleeding anticoagulation can usually be resumed 12 hours after a  birth or six hours after a vaginal birth.  Again, pneumatic compression devices are recommended.  Breastfeeding is safe on  enoxaparin.  Avoidance of estrogen containing birth control is recommended.        Recommendations (Hx of DVT, PTD)    - Continue with enoxaparin 40 mg daily LMWH  - Appropriate candidate for management by primary OBGYN and delivery at [facility]  - Baseline transvaginal cervical length at 16 weeks' gestation with measurements every week through 23 weeks  - Level II detailed Anatomy US at [facility] recommended between week 18-20  - Fetal echocardiogram  at [facility] recommended between week 20-22   - Recommend potential 39-40 week IOL for holding anticoagulation 12 hours prior to regional anesthesia  - Consultation with anesthesiology- regional anesthesia desired  - Sequential compression devices until the patient is fully ambulatory and has resumed anticoagulation therapy  - Continuation of LMWH for at least 6 weeks postpartum    The patient was seen and evaluated with Dr. English    Thank you for allowing us to participate in the care of your patient. Please do not hesitate to contact us if you have further questions regarding the management of your patient.       Pipo Henley DO, MA  [facility] OBGYN- PGY2  12:21 PM January 25, 2024     Physician Attestation   I, Rishi English MD, saw this patient and agree with the findings and plan of care as documented in the note.      Items personally reviewed/procedural attestation: History, counseling and recommendations. The total time spent in all patient care activities was 30 minutes.    Rishi English MD

## 2024-01-24 ENCOUNTER — TRANSFERRED RECORDS (OUTPATIENT)
Dept: HEALTH INFORMATION MANAGEMENT | Facility: CLINIC | Age: 34
End: 2024-01-24

## 2024-01-25 ENCOUNTER — TRANSCRIBE ORDERS (OUTPATIENT)
Dept: MATERNAL FETAL MEDICINE | Facility: CLINIC | Age: 34
End: 2024-01-25

## 2024-01-25 ENCOUNTER — OFFICE VISIT (OUTPATIENT)
Dept: MATERNAL FETAL MEDICINE | Facility: CLINIC | Age: 34
End: 2024-01-25
Attending: OBSTETRICS & GYNECOLOGY
Payer: COMMERCIAL

## 2024-01-25 ENCOUNTER — TRANSFERRED RECORDS (OUTPATIENT)
Dept: HEALTH INFORMATION MANAGEMENT | Facility: CLINIC | Age: 34
End: 2024-01-25

## 2024-01-25 ENCOUNTER — MEDICAL CORRESPONDENCE (OUTPATIENT)
Dept: HEALTH INFORMATION MANAGEMENT | Facility: CLINIC | Age: 34
End: 2024-01-25

## 2024-01-25 VITALS
RESPIRATION RATE: 16 BRPM | OXYGEN SATURATION: 100 % | SYSTOLIC BLOOD PRESSURE: 116 MMHG | HEART RATE: 76 BPM | DIASTOLIC BLOOD PRESSURE: 77 MMHG

## 2024-01-25 DIAGNOSIS — Z86.718 PERSONAL HISTORY OF DVT (DEEP VEIN THROMBOSIS): ICD-10-CM

## 2024-01-25 DIAGNOSIS — O09.90 HIGH-RISK PREGNANCY, UNSPECIFIED TRIMESTER: Primary | ICD-10-CM

## 2024-01-25 DIAGNOSIS — O09.891 H/O PRETERM DELIVERY, CURRENTLY PREGNANT, FIRST TRIMESTER: ICD-10-CM

## 2024-01-25 DIAGNOSIS — O26.90 PREGNANCY RELATED CONDITION, ANTEPARTUM: Primary | ICD-10-CM

## 2024-01-25 PROCEDURE — 99213 OFFICE O/P EST LOW 20 MIN: CPT | Performed by: OBSTETRICS & GYNECOLOGY

## 2024-01-25 PROCEDURE — 99203 OFFICE O/P NEW LOW 30 MIN: CPT | Mod: GC | Performed by: OBSTETRICS & GYNECOLOGY

## 2024-01-25 NOTE — NURSING NOTE
Toma presented to [facility] alone for consult related to her history. Dr. English and Dr. Henley met with patient for consult, see their notes for details. Plan for L2/TV with M.

## 2024-02-06 NOTE — PROGRESS NOTES
[facility] Dermatology Note  Encounter Date: Feb 15, 2024  Office Visit       Dermatology Problem List:  1. HS   - Current tx: cephalexin 500 mg; metronidazole cream    2. Perioral Dermatitis  - Current tx: metronidazole cream  SHx: historian   _______________________________________    Assessment & Plan:  # HS  - Detailed discussion of etiology and nature of condition, along with risks and benefits of treatment options. Treatment options include antibiotics, anti-hormonals, immunosuppressives, non-immunosuppressive anti-inflammatories, procedures (LHR, Botox), diet and lifestyle changes (cutting out milk, rawls's yeast, Mediterranean diet, intermittent fasting) along with nutritional counseling, and topical pain medication for flares. Clinical trials are also an option.   - Discussed several treatment options in detail.  - Mild flare:   - Start cephalexin 500 mg-take 1 capsule twice a day    After giving birth:  - Spironolactone 100 mg- take 1 tablet daily (will start after birth)  - Discussed laser hair treatment (letter written for nd-yag laser)      # Perioral Dermatitis  - Start Metronidazole cream twice a day        Follow-up: 6 month(s) in-person, or earlier for new or changing lesions    Staff and Scribe:       Scribe Disclosure:   I, Diana Hall, am serving as a scribe; to document services personally performed by Cesario Garza MD -based on data collection and the provider's statements to me.       Provider Disclosure:   The documentation recorded by the scribe accurately reflects the services I personally performed and the decisions made by me.    Cesario Garza MD, FAAD    Departments of Internal Medicine and Dermatology  [facility]  [phone]  _________________________________    CC: Derm Problem (HS)    HPI:  Ms. Toma Blanco is a(n) 33 year old female who presents today as a new patient for HS. She is a  at roughly 8-9 weeks  gestational age. HS started at age 10  Has taken spironolactone and keflex in the past, discontinue due to pregnancy planning.   Hidradenitis suppurativa - Referred by Alesia Harris MD in [facility]/OB - Recs in [facility] - no outside Recs - per Pt Toma    Today, she presents with flaring in areas of the groin/ inner thigh and axilla area. Her pain is 4/10. She started using H&S shampoo to flaring areas, which has helped. She will get new lesions every few month in the groin area. History of taking doxycycline in the past. She does notice flaring worse with menstrual cycle. Experiencing some joint pain with her HS. Noticed, that she has some flaring small bumps around her nose that started around 1 year ago. She is taking lovenox for blood clotting due to history of miscarriage. She has 1 child and pregnant (8-9 weeks) with second child.  This summer, her flaring has increased to the axilla area and the groin area significantly more than usual.    Denies that HS affects her intimacy     Family hx: Brother (HS); blood clot; diabetes      Screenings:  - Depression/Anxiety:  no  - Sexual dysfunction: NA  - Tobacco use:no  - PCOS: no  - Obesity: no  - DMII or Insulin Resistance:no  - Hypertension: no  - Hyperlipidemia: no  - Inflammatory bowel disease:  - Spondyloarthropathy: no      Patient is otherwise feeling well, without additional skin concerns.      Physical Exam:  Vitals: /65   Pulse 82   Temp 97.9  F (36.6  C) (Oral)   Wt 111.7 kg (246 lb 4.8 oz)   LMP 11/02/2023   SpO2 98%   BMI 35.34 kg/m    SKIN: Full skin, which includes the head/face, both arms, chest, back, abdomen,both legs, genitalia and/or groin buttocks, digits and/or nails, was examined.  - Abdomen: Clear  - right thigh-acneiform papules  - Left axilla involved with scarring  - small papules on upper cutaneous lip and scattered papules nasal labial fold and jaw margin  - No other lesions of concern on areas examined.      Medications:  Current Outpatient Medications   Medication    acetaminophen (TYLENOL) 500 MG tablet    cephALEXin (KEFLEX) 500 MG capsule    enoxaparin ANTICOAGULANT (LOVENOX) 40 MG/0.4ML syringe    MAGNESIUM GLYCINATE PO    metroNIDAZOLE (METROGEL) 1 % external gel    Prenatal Vit-DSS-Fe Cbn-FA (PRENATAL AD PO)    spironolactone (ALDACTONE) 100 MG tablet    Vitamin D3 (VITAMIN D, CHOLECALCIFEROL,) 25 mcg (1000 units) tablet    Alpha-Lipoic Acid 200 MG TABS    aspirin 81 MG EC tablet     No current facility-administered medications for this visit.      Past Medical History:   Patient Active Problem List   Diagnosis    Hidradenitis suppurativa    Acute deep vein thrombosis (DVT) of proximal vein of left lower extremity (H)    Vulval hidradenitis suppurativa    History of renal calculi    History of miscarriage    Migraine with aura and without status migrainosus, not intractable    Perioral dermatitis    Liver lesion    Thyroid nodule     Past Medical History:   Diagnosis Date    Acute deep vein thrombosis (DVT) of proximal vein of left lower extremity (H)     post partum    Anxiety     Hidradenitis suppurativa     Infection due to 2019 novel coronavirus     Liver lesion 09/22/2023    Migraine with aura and without status migrainosus, not intractable 09/22/2023    Migraine with aura and without status migrainosus, not intractable 09/22/2023    Nephrolithiasis     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, antepartum     Plantar fascial fibromatosis of right foot     Thyroid nodule 09/22/2023    Varicella         CC Alesia Harris MD  [address]  [place] [address] on close of this encounter.

## 2024-02-15 ENCOUNTER — OFFICE VISIT (OUTPATIENT)
Dept: DERMATOLOGY | Facility: CLINIC | Age: 34
End: 2024-02-15
Attending: FAMILY MEDICINE

## 2024-02-15 VITALS
WEIGHT: 246.3 LBS | BODY MASS INDEX: 35.34 KG/M2 | HEART RATE: 82 BPM | TEMPERATURE: 97.9 F | OXYGEN SATURATION: 98 % | SYSTOLIC BLOOD PRESSURE: 108 MMHG | DIASTOLIC BLOOD PRESSURE: 65 MMHG

## 2024-02-15 DIAGNOSIS — L71.0 PERIORAL DERMATITIS: Primary | ICD-10-CM

## 2024-02-15 DIAGNOSIS — L73.2 HIDRADENITIS SUPPURATIVA: ICD-10-CM

## 2024-02-15 PROCEDURE — 99204 OFFICE O/P NEW MOD 45 MIN: CPT | Performed by: DERMATOLOGY

## 2024-02-15 PROCEDURE — 99213 OFFICE O/P EST LOW 20 MIN: CPT | Performed by: DERMATOLOGY

## 2024-02-15 RX ORDER — SPIRONOLACTONE 100 MG/1
100 TABLET, FILM COATED ORAL DAILY
Qty: 90 TABLET | Refills: 3 | Status: SHIPPED | OUTPATIENT
Start: 2024-02-15 | End: 2024-04-15

## 2024-02-15 RX ORDER — METRONIDAZOLE 10 MG/G
GEL TOPICAL DAILY
Qty: 60 G | Refills: 4 | Status: SHIPPED | OUTPATIENT
Start: 2024-02-15 | End: 2024-04-15

## 2024-02-15 RX ORDER — CEPHALEXIN 500 MG/1
500 CAPSULE ORAL 2 TIMES DAILY
Qty: 28 CAPSULE | Refills: 3 | Status: SHIPPED | OUTPATIENT
Start: 2024-02-15 | End: 2024-04-15

## 2024-02-15 NOTE — LETTER
2/15/2024       RE: Toma Blanco  [address]  [place] [address]     Dear Colleague,    Thank you for referring your patient, Toma Blanco, to the [facility] RHEUMATOLOGY CLINIC [place] at [facility]. Please see a copy of my visit note below.    [facility] Dermatology Note  Encounter Date: Feb 15, 2024  Office Visit       Dermatology Problem List:  1. HS   - Current tx: cephalexin 500 mg; metronidazole cream    2. Perioral Dermatitis  - Current tx: metronidazole cream  SHx: historian   _______________________________________    Assessment & Plan:  # HS  - Detailed discussion of etiology and nature of condition, along with risks and benefits of treatment options. Treatment options include antibiotics, anti-hormonals, immunosuppressives, non-immunosuppressive anti-inflammatories, procedures (LHR, Botox), diet and lifestyle changes (cutting out milk, rawls's yeast, Mediterranean diet, intermittent fasting) along with nutritional counseling, and topical pain medication for flares. Clinical trials are also an option.   - Discussed several treatment options in detail.  - Mild flare:   - Start cephalexin 500 mg-take 1 capsule twice a day    After giving birth:  - Spironolactone 100 mg- take 1 tablet daily (will start after birth)  - Discussed laser hair treatment (letter written for nd-yag laser)      # Perioral Dermatitis  - Start Metronidazole cream twice a day        Follow-up: 6 month(s) in-person, or earlier for new or changing lesions    Staff and Scribe:       Scribe Disclosure:   I, Diana Hall, am serving as a scribe; to document services personally performed by Cesario Garza MD -based on data collection and the provider's statements to me.       Provider Disclosure:   The documentation recorded by the scribe accurately reflects the services I personally performed and the decisions made by me.    Cesario  MD Greg, FAAD    Departments of Internal Medicine and Dermatology  [facility]  [phone]  _________________________________    CC: Derm Problem (HS)    HPI:  Ms. Toma Blanco is a(n) 33 year old female who presents today as a new patient for HS. She is a  at roughly 8-9 weeks gestational age. HS started at age 10  Has taken spironolactone and keflex in the past, discontinue due to pregnancy planning.   Hidradenitis suppurativa - Referred by Alesia Harris MD in [facility] MEDICINE/OB - Recs in [facility] - no outside Recs - per Pt Toma    Today, she presents with flaring in areas of the groin/ inner thigh and axilla area. Her pain is 4/10. She started using H&S shampoo to flaring areas, which has helped. She will get new lesions every few month in the groin area. History of taking doxycycline in the past. She does notice flaring worse with menstrual cycle. Experiencing some joint pain with her HS. Noticed, that she has some flaring small bumps around her nose that started around 1 year ago. She is taking lovenox for blood clotting due to history of miscarriage. She has 1 child and pregnant (8-9 weeks) with second child.  This summer, her flaring has increased to the axilla area and the groin area significantly more than usual.    Denies that HS affects her intimacy     Family hx: Brother (HS); blood clot; diabetes      Screenings:  - Depression/Anxiety:  no  - Sexual dysfunction: NA  - Tobacco use:no  - PCOS: no  - Obesity: no  - DMII or Insulin Resistance:no  - Hypertension: no  - Hyperlipidemia: no  - Inflammatory bowel disease:  - Spondyloarthropathy: no      Patient is otherwise feeling well, without additional skin concerns.      Physical Exam:  Vitals: /65   Pulse 82   Temp 97.9  F (36.6  C) (Oral)   Wt 111.7 kg (246 lb 4.8 oz)   LMP 2023   SpO2 98%   BMI 35.34 kg/m    SKIN: Full skin, which includes the head/face, both arms, chest, back,  abdomen,both legs, genitalia and/or groin buttocks, digits and/or nails, was examined.  - Abdomen: Clear  - right thigh-acneiform papules  - Left axilla involved with scarring  - small papules on upper cutaneous lip and scattered papules nasal labial fold and jaw margin  - No other lesions of concern on areas examined.     Medications:  Current Outpatient Medications   Medication    acetaminophen (TYLENOL) 500 MG tablet    cephALEXin (KEFLEX) 500 MG capsule    enoxaparin ANTICOAGULANT (LOVENOX) 40 MG/0.4ML syringe    MAGNESIUM GLYCINATE PO    metroNIDAZOLE (METROGEL) 1 % external gel    Prenatal Vit-DSS-Fe Cbn-FA (PRENATAL AD PO)    spironolactone (ALDACTONE) 100 MG tablet    Vitamin D3 (VITAMIN D, CHOLECALCIFEROL,) 25 mcg (1000 units) tablet    Alpha-Lipoic Acid 200 MG TABS    aspirin 81 MG EC tablet     No current facility-administered medications for this visit.      Past Medical History:   Patient Active Problem List   Diagnosis    Hidradenitis suppurativa    Acute deep vein thrombosis (DVT) of proximal vein of left lower extremity (H)    Vulval hidradenitis suppurativa    History of renal calculi    History of miscarriage    Migraine with aura and without status migrainosus, not intractable    Perioral dermatitis    Liver lesion    Thyroid nodule     Past Medical History:   Diagnosis Date    Acute deep vein thrombosis (DVT) of proximal vein of left lower extremity (H)     post partum    Anxiety     Hidradenitis suppurativa     Infection due to 2019 novel coronavirus     Liver lesion 09/22/2023    Migraine with aura and without status migrainosus, not intractable 09/22/2023    Migraine with aura and without status migrainosus, not intractable 09/22/2023    Nephrolithiasis     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, antepartum     Plantar fascial fibromatosis of right foot     Thyroid nodule 09/22/2023    Varicella         CC Alesia Harris MD  [address]  [address]  [place] [address] on close  of this encounter.

## 2024-02-15 NOTE — LETTER
"2/15/2024       RE: Toma Blanco  [address] [place] [address]     To Whom This May Concern,       We are writing to request coverage of long-pulsed 1064-nm Nd: YAG laser treatment for  hidradenitis suppurativa for Toma Blanco. This patient has symptoms of including intermittent folliculitis, inflammatory nodules and intermittent abscess in the axilla, groin and thighs. She has already tried antibitoics and spironolactone with continued new lesions.   .   Hidradenitis responds well to ND-YAG laser hair removal as it is a follicular process. We would expect 1 treatment every 3- 6 weeks for a maximum of 12 treatments.     Finally, this treatment has been recommended in some situations by the North American HS Management Guidelines (J Am Acad Dermatol. 2020 Nov;83(5):1981-2075)    Please, see the following references:   Gabby Caro et al. \"Randomized control trial for the treatment of hidradenitis suppurativa with a neodymium?doped yttrium aluminium garnet laser.\" Dermatologic surgery 35.8 (2009): 7448-8824.     Day Tamayo, et al. \"Histopathologic study of hidradenitis suppurativa following long-pulsed 1064-nm Nd: YAG laser treatment.\" Archives of dermatology 147.1 (2011): 21-28.   Jeovanny Gaitan, et al. \"Prospective controlled clinical and histopathologic study of hidradenitis suppurativa treated with the long-pulsed neodymium: yttrium-aluminium-garnet laser.\" Journal of the American Academy of Dermatology 62.4 (2010): 637-645.     We strive to provide our patient with outstanding care. Therefore, I request you please contact me at [phone] if you have any questions regarding coverage or our treatment rational.     Cesario Garza MD, FAAD, FACP      Department of Dermatology   [employer]   Phone([facility]): [phone]     "

## 2024-02-15 NOTE — PROGRESS NOTES
HS Nurse Assessment        2/15/2024    11:09 AM   Nurse Assessment Data   Over the past 30 days how many old lesions flared back up? 4-5   Over the past 30 days how many new lesions did you get? 1   Over the past week, how many dressing changes do you do each day? 0   Over the past week, has your wound drainage been: None   Rate your HS overall from 0 - 10 (0 = no disease, 10 = worst) over the past week:  1   Rate your pain score from 0 - 10 (0 = no disease, 10 = worst) for the most painful/symptomatic lesion in the past week:  4   Over the past week, how much has HS influenced your quality of life? slightly

## 2024-02-15 NOTE — PATIENT INSTRUCTIONS
Plan  1) HS  - Mild flares: metronidazole cream twice a day   - Cephalexin twice a day for 2 weeks for flares  - Spironolactone to start after birth  - Letter written for nd-yag laser    2) Peroral dermatitis  - Metronidazole cream twice a day     HIDRADENITIS SUPPURATIVA     What is hidradenitis suppurativa (HS)?  Hidradenitis suppurativa (HS) is a chronic skin disorder affecting the hair follicles. The disease starts with sore red lumps (or boils), typically involving the armpits, breasts, lower abdomen, groin, and buttocks. These lesions appear suddenly, increase in size and then burst or rupture, usually under the surface of the skin. This causes severe pain. Sometimes they rupture to the surface of the skin, draining pus. In some people, they can result in tunnels under the skin that may or may not continue to drain. When the lumps heal, they can leave scars.     Facts:   HS is a chronic skin disease, that comes and goes in flares, and is very painful  HS happens in many people - men and women, young and elderly, all races and ethnicities. But HS is more common in young adults, women  and skin of color  One out of three people with HS has a family member with HS   HS is NOT due to an infection or washing habits  HS is NOT contagious, so it cannot be spread from one person to another person   HS is NOT caused by how well you wash or what soaps you use  HS is NOT caused by smoking or obesity, but quitting smoking and losing weight have helped some people        Causes  The cause of HS remains unclear. It is thought that there is a problem in the hair follicle that makes it weaker and easier to break open. The current theory is that there are three steps that cause an HS lesion to form::   The hair follicle gets plugged   The hair follicle swells and then ruptures, causing pain and inflammation   In some people, the inflammation does not stop and results in tunneling through the skin       Associated  Conditions  HS is associated with several other medical conditions and activities including:  Tobacco use  High cholesterol, heart disease and stroke   Type 2 diabetes   Depression and anxiety   Arthritis, which causes stiffness and pain in joints   Inflammatory bowel disease (including Crohn's disease and ulcerative colitis)  Severe acne and pilonidal sinus/cyst  Polycystic ovarian syndrome  Skin cancer in areas of longstanding HS lesions, typically in the groin or buttocks (rare)    It is important to ask your physician to watch out for these conditions.      To help manage mental health issues related to HS and emotional support:  Help finding a mental health specialist: https://www.psychologytoday.com/us/therapists  Suicide prevention (24/7 free confidential support):   Phone: (997) 258-1851  Website: https://suicidepreventionlifeline.org/    Support groups:    Hope for HS: www.hopeforhs.org  HS Connect: www.[facility].org    HS Patient Support Application for your phone: Papaya (developed in collaboration with patient and community HS advocacy groups)    Intimacy support: American Association of Sexuality Educators, Counselors and Therapists (AASECT) website: https://www.aasect.org      For help quitting smoking   Phone:  English: 6-677-NLVB-NOW (1-526.130.4701)  Tongan: 5-755-SOCUOM-YA (1-483.268.2876)    Website:   English: https://smokefree.gov/  Tongan: valarieanol.smokefree.gov     Lifestyle Modifications   Quitting smoking or weight loss can help your overall health and may help improve HS symptoms in some people, but not everyone.   It is recommended to eat a well-balanced, healthy diet (https://health.gov/dietaryguidelines) and to maintain a healthy weight. Avoiding dietary triggers can help some people suffering with HS, but will not necessarily help others with HS.    Some people may find that friction, irritation, and rubbing may worsen HS symptoms. Some people do better with loose, breathable clothing  and fabrics. Shaving close to the affected areas may also worsen HS in some people. But, not everyone has the same triggers for their HS, so see what works for you!    Some medications may worsen HS such as lithium, testosterone, and some progesterone-only birth control.  Please discuss this with your provider before stopping medication.     Zinc supplementation has been shown in some studies to help HS. However, every treatment can have a side effects,  so talk to your provider before starting any treatment.     Treatment                    Medicines, procedures and surgeries are offered to treat HS. Treatment can help reduce breakouts and improve quality of life.  Medicines used:  Topical washes (e.g. chlorhexidine, benzoyl peroxide)    Clindamycin on the skin - seems effective for pustules and papules. Probably not helpful for larger chronic lesions.     Oral antibiotics (e.g. doxycycline, clindamycin + rifampin, dapsone) - antibiotics may help some, but not all patients    Hormonal therapies (e.g. spironolactone, oral contraceptives) - primarily used in females though to have a hormonal component to their HS (e.g perimenstrual flares, worsening in pregnancy, polycystic ovarian syndrome)    Immunosuppression (e.g. prednisone)     Injectables (e.g. adalimumab, secukinumab, infliximab) - Adalimumab and Secukinumab are the only federal drug administration (FDA) approved medication for HS  Education for adalimumab injections: https://www.BuyPlayWin/Personal Style Finder-complete/injection  Adalimumab abassador program:   Website: https://www.BuyPlayWin/Personal Style Finder-complete/sign-up/  Phone: 2.874.4WLGDTL (1.984.489.4751)    Procedures:  Intralesional steroid injections - may help areas of acute active inflammation     Some hair removal lasers - If considering this option, we recommend doing this only with a medical professional that has experience treating HS.     Surgeries:  Incision and drainage - Provides fast, short-term relief, but  symptoms likely to recur.        Deroofing - This surgery involves opening the lesion and cleaning out the base. This treatment is effective for recurring lesions.  Recurrence rates seem to be similar to excision. These are typically left open and allowed to heal on their own over 1-3 months      Excision - This involves cutting out chronic lesions.  This may entail cutting out a large affected area referred to as wide local excision, or cutting out single lesions, referred to as localized excisions.  Excision may be done with a scalpel, electric heating device or laser (e.g. carbon dioxide [CO2] laser).  These are either allowed to heal on their own, closed with sutures, or closed with a graft or flap.  Grafts are typically done by taking skin from one site of the body and using it to close another part of the body.  Flaps are done by moving tissue around to close a wound.     Check out this website to help decide the best treatment options for you!     https://www.informed-decisions.org/hidradenitispda.php

## 2024-03-04 ENCOUNTER — DOCUMENTATION ONLY (OUTPATIENT)
Dept: HEMATOLOGY | Facility: CLINIC | Age: 34
End: 2024-03-04
Payer: COMMERCIAL

## 2024-03-04 DIAGNOSIS — Z86.718 PERSONAL HISTORY OF DVT (DEEP VEIN THROMBOSIS): ICD-10-CM

## 2024-03-04 RX ORDER — ENOXAPARIN SODIUM 100 MG/ML
40 INJECTION SUBCUTANEOUS DAILY
Qty: 12 ML | Refills: 7 | Status: SHIPPED | OUTPATIENT
Start: 2024-03-04

## 2024-03-04 NOTE — PROGRESS NOTES
Received a message from the [facility] pharmacy team stating that Toma's insurance changed and that she will now be filling enoxaparin through [facility] pharmacy. [facility] pharmacy therefore needs a new script sent.    I updated provider REYNA Martin that patient is ~17 weeks pregnant. She had a MFM consult and they gave recommendation regarding her history of DVT. Recommendations include continuing enoxaparin 40mg daily, potential IOL at 39-40 weeks to allow for a 12 hour hold of anticoagulation prior to regional anesthesia, and enoxaparin 6 weeks post-partum. Magdalena gave OK top refill enoxaparin at this time. She does not feel that Toma needs to be seen at [facility] at this time as it appears that MFM have made recommendations already. New script sent.    Called Toma and left her a VM letting her know the script has been sent, and to reach out to [facility] with any questions, or if she would like to see REYNA Nichols for a follow up.    Oumar LIMON RN  [facility]  Office: [phone]  Clinic: [phone]  Fax: [phone]

## 2024-03-05 ENCOUNTER — TRANSFERRED RECORDS (OUTPATIENT)
Dept: HEALTH INFORMATION MANAGEMENT | Facility: CLINIC | Age: 34
End: 2024-03-05
Payer: COMMERCIAL

## 2024-03-25 ENCOUNTER — OFFICE VISIT (OUTPATIENT)
Dept: MATERNAL FETAL MEDICINE | Facility: HOSPITAL | Age: 34
End: 2024-03-25
Attending: STUDENT IN AN ORGANIZED HEALTH CARE EDUCATION/TRAINING PROGRAM
Payer: COMMERCIAL

## 2024-03-25 ENCOUNTER — ANCILLARY PROCEDURE (OUTPATIENT)
Dept: ULTRASOUND IMAGING | Facility: HOSPITAL | Age: 34
End: 2024-03-25
Attending: STUDENT IN AN ORGANIZED HEALTH CARE EDUCATION/TRAINING PROGRAM
Payer: COMMERCIAL

## 2024-03-25 DIAGNOSIS — Z36.2 ENCOUNTER FOR FOLLOW-UP ULTRASOUND OF FETAL ANATOMY: Primary | ICD-10-CM

## 2024-03-25 DIAGNOSIS — Z82.79 FAMILY HISTORY OF CONGENITAL ANOMALIES: ICD-10-CM

## 2024-03-25 DIAGNOSIS — O09.891 H/O PRETERM DELIVERY, CURRENTLY PREGNANT, FIRST TRIMESTER: ICD-10-CM

## 2024-03-25 DIAGNOSIS — O26.90 PREGNANCY RELATED CONDITION, ANTEPARTUM: ICD-10-CM

## 2024-03-25 DIAGNOSIS — Z86.718 PERSONAL HISTORY OF DVT (DEEP VEIN THROMBOSIS): ICD-10-CM

## 2024-03-25 PROCEDURE — 76817 TRANSVAGINAL US OBSTETRIC: CPT | Mod: 26 | Performed by: STUDENT IN AN ORGANIZED HEALTH CARE EDUCATION/TRAINING PROGRAM

## 2024-03-25 PROCEDURE — 76811 OB US DETAILED SNGL FETUS: CPT

## 2024-03-25 PROCEDURE — 76811 OB US DETAILED SNGL FETUS: CPT | Mod: 26 | Performed by: STUDENT IN AN ORGANIZED HEALTH CARE EDUCATION/TRAINING PROGRAM

## 2024-03-25 PROCEDURE — 99213 OFFICE O/P EST LOW 20 MIN: CPT | Mod: 25 | Performed by: STUDENT IN AN ORGANIZED HEALTH CARE EDUCATION/TRAINING PROGRAM

## 2024-03-25 NOTE — PROGRESS NOTES
"Please see \"Imaging\" tab under \"Chart Review\" for details of today's visit.    Lizette Cabrera    "

## 2024-03-25 NOTE — NURSING NOTE
Toma Blanco is a  at 20w4d who presents to [facility] for a comprehensive ultrasound. Pt reports positive fetal movement. Pt denies bldg/lof/change in discharge, contractions, headache, vision changes, chest pain/SOB or edema. SBAR given to Dr. BASHIR Cabrera, see note in Epic.      Deborah Vivar RN

## 2024-03-29 ENCOUNTER — TRANSFERRED RECORDS (OUTPATIENT)
Dept: HEALTH INFORMATION MANAGEMENT | Facility: CLINIC | Age: 34
End: 2024-03-29
Payer: COMMERCIAL

## 2024-03-29 ENCOUNTER — MEDICAL CORRESPONDENCE (OUTPATIENT)
Dept: HEALTH INFORMATION MANAGEMENT | Facility: CLINIC | Age: 34
End: 2024-03-29
Payer: COMMERCIAL

## 2024-04-15 ENCOUNTER — OFFICE VISIT (OUTPATIENT)
Dept: CARDIOLOGY | Facility: CLINIC | Age: 34
End: 2024-04-15
Payer: COMMERCIAL

## 2024-04-15 VITALS
DIASTOLIC BLOOD PRESSURE: 70 MMHG | BODY MASS INDEX: 36.36 KG/M2 | OXYGEN SATURATION: 98 % | WEIGHT: 253.4 LBS | HEART RATE: 73 BPM | SYSTOLIC BLOOD PRESSURE: 112 MMHG

## 2024-04-15 DIAGNOSIS — R07.89 CHEST WALL PAIN: ICD-10-CM

## 2024-04-15 DIAGNOSIS — R00.2 PALPITATIONS: ICD-10-CM

## 2024-04-15 DIAGNOSIS — Z86.718 PERSONAL HISTORY OF DVT (DEEP VEIN THROMBOSIS): ICD-10-CM

## 2024-04-15 DIAGNOSIS — R06.09 DYSPNEA ON EXERTION: Primary | ICD-10-CM

## 2024-04-15 PROCEDURE — 99203 OFFICE O/P NEW LOW 30 MIN: CPT | Performed by: STUDENT IN AN ORGANIZED HEALTH CARE EDUCATION/TRAINING PROGRAM

## 2024-04-15 NOTE — PROGRESS NOTES
[facility] HEART CARE   [address]  [place] [address]   [email or web address]   OFFICE: [phone]     CARDIOLOGY CLINIC NOTE     Thank you, Alesia Leong, for asking the [facility] Heart Care team to see Ms. Toma Blanco to evaluate dyspnea, palpitations, chest pain      History of Present Illness   Ms. Toma Blanco is a 33 year old female with a significant past history of DVT who presents for evaluation of dyspnea, palpitations, chest pain.  The patient notes she has been getting short of breath when she lays flat or with exertion.  This happened with her first pregnancy however it seems to have happened earlier in this pregnancy now.  She denies significant dyspnea at rest.  She denies any significant leg edema.  She also notes longstanding palpitations described as a fluttering in her chest.  This has waxed/waned but currently is manageable.  She does have more prolonged racing heart sensations lasting about 30 seconds at a time.  This also seems to have calmed down.  Finally, she has had a few episodes of chest pain on the L side of her chest.  She attributes this to sleeping on her side.  She notes the pain changes with movement and deep breathing.  These pains self-resolve after a few minutes.  There does not seem to be any correlation with activity.         Medications  Allergies   Current Outpatient Medications   Medication Sig Dispense Refill    acetaminophen (TYLENOL) 500 MG tablet Take 500-1,000 mg by mouth every 8 hours as needed for mild pain      aspirin 81 MG EC tablet Take 81 mg by mouth daily      enoxaparin ANTICOAGULANT (LOVENOX) 40 MG/0.4ML syringe Inject 0.4 mLs (40 mg) Subcutaneous daily 12 mL 7    MAGNESIUM GLYCINATE PO Take 400 mg by mouth daily      Prenatal Vit-DSS-Fe Cbn-FA (PRENATAL AD PO) Take 1 tablet by mouth daily      Vitamin D3 (VITAMIN D, CHOLECALCIFEROL,) 25 mcg (1000 units) tablet Take 1 tablet by mouth 2  times daily        No Known Allergies     Physical Examination Review of Systems   Vitals: /70 (BP Location: Left arm, Patient Position: Sitting, Cuff Size: Adult Regular)   Pulse 73   Wt 114.9 kg (253 lb 6.4 oz)   LMP 2023   SpO2 98%   BMI 36.36 kg/m    BMI= Body mass index is 36.36 kg/m .  Wt Readings from Last 3 Encounters:   04/15/24 114.9 kg (253 lb 6.4 oz)   02/15/24 111.7 kg (246 lb 4.8 oz)   24 108.9 kg (240 lb)       General: pleasant female. No acute distress.   Neck: No JVD  Lungs: clear to auscultation  COR:  regular rate and rhythm, No murmurs, rubs, or gallops  Extrem: No edema        Please refer above for cardiac ROS details.       Past History     Family History:   Family History   Problem Relation Age of Onset    Thyroid Disease Mother     Obesity Mother     Hypertension Mother     Depression Mother     Diabetes Father     Nephrolithiasis Father         uric acid    Preeclampsia Sister     Diabetes Brother     Depression Brother     No Known Problems Brother     Depression Brother     Other - See Comments Maternal Grandmother         superficial blood clots    Dementia Maternal Grandfather     Heart Disease Maternal Grandfather     Dementia Paternal Grandmother     Diabetes Paternal Grandfather     Heart Failure Paternal Grandfather     Alzheimer Disease Paternal Grandfather      Birth Son     Other - See Comments Son         medically complex, multiple congenital anomalies    Pulmonary Embolism Paternal Uncle         fatal    Pulmonary Embolism Other         fatal    Anesthesia Reaction No family hx of         Social History:   Social History     Socioeconomic History    Marital status:      Spouse name: Not on file    Number of children: Not on file    Years of education: Not on file    Highest education level: Not on file   Occupational History    Not on file   Tobacco Use    Smoking status: Never     Passive exposure: Never    Smokeless tobacco: Never    Vaping Use    Vaping status: Never Used   Substance and Sexual Activity    Alcohol use: Yes     Alcohol/week: 1.0 - 2.0 standard drink of alcohol     Types: 1 - 2 Standard drinks or equivalent per week     Comment: 1-2 twice a week    Drug use: Never    Sexual activity: Yes     Partners: Male   Other Topics Concern    Not on file   Social History Narrative    Not on file     Social Determinants of Health     Financial Resource Strain: Low Risk  (9/21/2023)    Financial Resource Strain     Within the past 12 months, have you or your family members you live with been unable to get utilities (heat, electricity) when it was really needed?: No   Food Insecurity: Low Risk  (9/21/2023)    Food Insecurity     Within the past 12 months, did you worry that your food would run out before you got money to buy more?: No     Within the past 12 months, did the food you bought just not last and you didn t have money to get more?: No   Transportation Needs: Low Risk  (9/21/2023)    Transportation Needs     Within the past 12 months, has lack of transportation kept you from medical appointments, getting your medicines, non-medical meetings or appointments, work, or from getting things that you need?: No   Physical Activity: Not on file   Stress: Not on file   Social Connections: Unknown (2/16/2022)    Received from [facility] [employer] [facility]    Social Connections     Frequency of Communication with Friends and Family: Not on file   Interpersonal Safety: Low Risk  (9/22/2023)    Interpersonal Safety     Do you feel physically and emotionally safe where you currently live?: Yes     Within the past 12 months, have you been hit, slapped, kicked or otherwise physically hurt by someone?: No     Within the past 12 months, have you been humiliated or emotionally abused in other ways by your partner or ex-partner?: No   Housing Stability: Low Risk  (9/21/2023)    Housing  Stability     Do you have housing? : Yes     Are you worried about losing your housing?: No            Lab Results    Chemistry/lipid CBC Cardiac Enzymes/BNP/TSH/INR   Lab Results   Component Value Date    BUN 8 08/25/2023     08/25/2023    CO2 21 (L) 08/25/2023    Lab Results   Component Value Date    WBC 11.2 (H) 12/22/2023    HGB 12.3 12/22/2023    HCT 37.8 12/22/2023    MCV 78 12/22/2023     12/22/2023    Lab Results   Component Value Date    TSH 1.29 07/24/2023    INR 1.02 12/22/2023              Assessment/Recommendations   Assessment:    Ms. Toma Blanco is a 33 year old female with a significant past history of DVT who presents for evaluation of dyspnea, palpitations, chest pain.     Dyspnea   - Dyspnea and orthopnea may be normal during pregnancy, related to compression of IVC and also increased oxygen demands   - Will get a TTE to rule out PPCM     Palpitations   - Likely ectopy, which can increase during pregnancy related to increased cardiac output   - Possibly she is having SVT as well.  We discussed that most SVTs during pregnancy are managed conservatively.  She does not wish to pursue any evaluation at this time, but may consider if symptoms worsen.  If prolonged palpitations continue after her pregnancy could consider evaluation with an event monitor at that point.   - Continue to limit caffeine and ensure adequate hydration    Chest pain   - Likely MSK in origin.     - CTM    RTC on an as needed basis          Raheem Minor, DO FACC  Non-invasive Cardiologist  [facility]

## 2024-04-15 NOTE — PATIENT INSTRUCTIONS
It was a pleasure meeting you today    In summary:    We will get an echocardiogram to evaluate the shortness of breath.  We will monitor the other symptoms you are having.     Please call my nurse Paresh Lopez at [phone] if you have any questions or issues.    We will schedule a follow up visit if needed      Raheem Minor DO [facility]  Non-invasive Cardiologist  [facility]

## 2024-04-15 NOTE — LETTER
4/15/2024    Alesia Harris MD  [address]  [facility] [address]    RE: Toma Blanco       Dear Colleague,     I had the pleasure of seeing Toma Blanco in the [facility].    [facility] HEART CARE   [address]  [place] [address]   [email or web address]   OFFICE: [phone]     CARDIOLOGY CLINIC NOTE     Thank you, Dr. Harris, Alesia Moulton, for asking the [facility] Heart Care team to see Ms. Toma Blanco to evaluate dyspnea, palpitations, chest pain      History of Present Illness   Ms. Toma Blanco is a 33 year old female with a significant past history of DVT who presents for evaluation of dyspnea, palpitations, chest pain.  The patient notes she has been getting short of breath when she lays flat or with exertion.  This happened with her first pregnancy however it seems to have happened earlier in this pregnancy now.  She denies significant dyspnea at rest.  She denies any significant leg edema.  She also notes longstanding palpitations described as a fluttering in her chest.  This has waxed/waned but currently is manageable.  She does have more prolonged racing heart sensations lasting about 30 seconds at a time.  This also seems to have calmed down.  Finally, she has had a few episodes of chest pain on the L side of her chest.  She attributes this to sleeping on her side.  She notes the pain changes with movement and deep breathing.  These pains self-resolve after a few minutes.  There does not seem to be any correlation with activity.         Medications  Allergies   Current Outpatient Medications   Medication Sig Dispense Refill    acetaminophen (TYLENOL) 500 MG tablet Take 500-1,000 mg by mouth every 8 hours as needed for mild pain      aspirin 81 MG EC tablet Take 81 mg by mouth daily      enoxaparin ANTICOAGULANT (LOVENOX) 40 MG/0.4ML syringe Inject 0.4 mLs (40 mg) Subcutaneous daily 12 mL 7     MAGNESIUM GLYCINATE PO Take 400 mg by mouth daily      Prenatal Vit-DSS-Fe Cbn-FA (PRENATAL AD PO) Take 1 tablet by mouth daily      Vitamin D3 (VITAMIN D, CHOLECALCIFEROL,) 25 mcg (1000 units) tablet Take 1 tablet by mouth 2 times daily        No Known Allergies     Physical Examination Review of Systems   Vitals: /70 (BP Location: Left arm, Patient Position: Sitting, Cuff Size: Adult Regular)   Pulse 73   Wt 114.9 kg (253 lb 6.4 oz)   LMP 2023   SpO2 98%   BMI 36.36 kg/m    BMI= Body mass index is 36.36 kg/m .  Wt Readings from Last 3 Encounters:   04/15/24 114.9 kg (253 lb 6.4 oz)   02/15/24 111.7 kg (246 lb 4.8 oz)   24 108.9 kg (240 lb)       General: pleasant female. No acute distress.   Neck: No JVD  Lungs: clear to auscultation  COR:  regular rate and rhythm, No murmurs, rubs, or gallops  Extrem: No edema        Please refer above for cardiac ROS details.       Past History     Family History:   Family History   Problem Relation Age of Onset    Thyroid Disease Mother     Obesity Mother     Hypertension Mother     Depression Mother     Diabetes Father     Nephrolithiasis Father         uric acid    Preeclampsia Sister     Diabetes Brother     Depression Brother     No Known Problems Brother     Depression Brother     Other - See Comments Maternal Grandmother         superficial blood clots    Dementia Maternal Grandfather     Heart Disease Maternal Grandfather     Dementia Paternal Grandmother     Diabetes Paternal Grandfather     Heart Failure Paternal Grandfather     Alzheimer Disease Paternal Grandfather      Birth Son     Other - See Comments Son         medically complex, multiple congenital anomalies    Pulmonary Embolism Paternal Uncle         fatal    Pulmonary Embolism Other         fatal    Anesthesia Reaction No family hx of         Social History:   Social History     Socioeconomic History    Marital status:      Spouse name: Not on file    Number of children:  Not on file    Years of education: Not on file    Highest education level: Not on file   Occupational History    Not on file   Tobacco Use    Smoking status: Never     Passive exposure: Never    Smokeless tobacco: Never   Vaping Use    Vaping status: Never Used   Substance and Sexual Activity    Alcohol use: Yes     Alcohol/week: 1.0 - 2.0 standard drink of alcohol     Types: 1 - 2 Standard drinks or equivalent per week     Comment: 1-2 twice a week    Drug use: Never    Sexual activity: Yes     Partners: Male   Other Topics Concern    Not on file   Social History Narrative    Not on file     Social Determinants of Health     Financial Resource Strain: Low Risk  (9/21/2023)    Financial Resource Strain     Within the past 12 months, have you or your family members you live with been unable to get utilities (heat, electricity) when it was really needed?: No   Food Insecurity: Low Risk  (9/21/2023)    Food Insecurity     Within the past 12 months, did you worry that your food would run out before you got money to buy more?: No     Within the past 12 months, did the food you bought just not last and you didn t have money to get more?: No   Transportation Needs: Low Risk  (9/21/2023)    Transportation Needs     Within the past 12 months, has lack of transportation kept you from medical appointments, getting your medicines, non-medical meetings or appointments, work, or from getting things that you need?: No   Physical Activity: Not on file   Stress: Not on file   Social Connections: Unknown (2/16/2022)    [facility]    Social Connections     Frequency of Communication with Friends and Family: Not on file   Interpersonal Safety: Low Risk  (9/22/2023)    Interpersonal Safety     Do you feel physically and emotionally safe where you currently live?: Yes     Within the past 12 months, have you been hit, slapped, kicked or otherwise  physically hurt by someone?: No     Within the past 12 months, have you been humiliated or emotionally abused in other ways by your partner or ex-partner?: No   Housing Stability: Low Risk  (9/21/2023)    Housing Stability     Do you have housing? : Yes     Are you worried about losing your housing?: No            Lab Results    Chemistry/lipid CBC Cardiac Enzymes/BNP/TSH/INR   Lab Results   Component Value Date    BUN 8 08/25/2023     08/25/2023    CO2 21 (L) 08/25/2023    Lab Results   Component Value Date    WBC 11.2 (H) 12/22/2023    HGB 12.3 12/22/2023    HCT 37.8 12/22/2023    MCV 78 12/22/2023     12/22/2023    Lab Results   Component Value Date    TSH 1.29 07/24/2023    INR 1.02 12/22/2023              Assessment/Recommendations   Assessment:    Ms. Toma Blanco is a 33 year old female with a significant past history of DVT who presents for evaluation of dyspnea, palpitations, chest pain.     Dyspnea   - Dyspnea and orthopnea may be normal during pregnancy, related to compression of IVC and also increased oxygen demands   - Will get a TTE to rule out PPCM     Palpitations   - Likely ectopy, which can increase during pregnancy related to increased cardiac output   - Possibly she is having SVT as well.  We discussed that most SVTs during pregnancy are managed conservatively.  She does not wish to pursue any evaluation at this time, but may consider if symptoms worsen.  If prolonged palpitations continue after her pregnancy could consider evaluation with an event monitor at that point.   - Continue to limit caffeine and ensure adequate hydration    Chest pain   - Likely MSK in origin.     - CTM    RTC on an as needed basis          Raheem Minor DO [facility]  Non-invasive Cardiologist  [facility] Heart Care         Thank you for allowing me to participate in the care of your patient.      Sincerely,     Raheem Minor DO     [facility] Heart  Care  cc:   No referring provider defined for this encounter.

## 2024-04-17 ENCOUNTER — HOSPITAL ENCOUNTER (OUTPATIENT)
Dept: CARDIOLOGY | Facility: CLINIC | Age: 34
Discharge: HOME | End: 2024-04-17
Attending: STUDENT IN AN ORGANIZED HEALTH CARE EDUCATION/TRAINING PROGRAM | Admitting: STUDENT IN AN ORGANIZED HEALTH CARE EDUCATION/TRAINING PROGRAM
Payer: COMMERCIAL

## 2024-04-17 ENCOUNTER — OFFICE VISIT (OUTPATIENT)
Dept: MATERNAL FETAL MEDICINE | Facility: HOSPITAL | Age: 34
End: 2024-04-17
Attending: STUDENT IN AN ORGANIZED HEALTH CARE EDUCATION/TRAINING PROGRAM
Payer: COMMERCIAL

## 2024-04-17 ENCOUNTER — ANCILLARY PROCEDURE (OUTPATIENT)
Dept: ULTRASOUND IMAGING | Facility: HOSPITAL | Age: 34
End: 2024-04-17
Attending: STUDENT IN AN ORGANIZED HEALTH CARE EDUCATION/TRAINING PROGRAM
Payer: COMMERCIAL

## 2024-04-17 DIAGNOSIS — O09.891 H/O PRETERM DELIVERY, CURRENTLY PREGNANT, FIRST TRIMESTER: ICD-10-CM

## 2024-04-17 DIAGNOSIS — Z82.79 FAMILY HISTORY OF CONGENITAL ANOMALIES: ICD-10-CM

## 2024-04-17 DIAGNOSIS — Z82.79 FAMILY HISTORY OF CONGENITAL ANOMALIES: Primary | ICD-10-CM

## 2024-04-17 DIAGNOSIS — Z36.2 ENCOUNTER FOR FOLLOW-UP ULTRASOUND OF FETAL ANATOMY: ICD-10-CM

## 2024-04-17 PROCEDURE — 99213 OFFICE O/P EST LOW 20 MIN: CPT | Mod: 25 | Performed by: STUDENT IN AN ORGANIZED HEALTH CARE EDUCATION/TRAINING PROGRAM

## 2024-04-17 PROCEDURE — 76827 ECHO EXAM OF FETAL HEART: CPT | Mod: 26 | Performed by: PEDIATRICS

## 2024-04-17 PROCEDURE — 76825 ECHO EXAM OF FETAL HEART: CPT | Mod: 26 | Performed by: PEDIATRICS

## 2024-04-17 PROCEDURE — 93325 DOPPLER ECHO COLOR FLOW MAPG: CPT

## 2024-04-17 PROCEDURE — 76816 OB US FOLLOW-UP PER FETUS: CPT

## 2024-04-17 PROCEDURE — 93325 DOPPLER ECHO COLOR FLOW MAPG: CPT | Mod: 26 | Performed by: PEDIATRICS

## 2024-04-17 PROCEDURE — 76816 OB US FOLLOW-UP PER FETUS: CPT | Mod: 26 | Performed by: STUDENT IN AN ORGANIZED HEALTH CARE EDUCATION/TRAINING PROGRAM

## 2024-04-17 NOTE — NURSING NOTE
Toma Blanco is a  at 23w6d who presents to [facility] for follow up growth ultrasound. Pt reports positive fetal movement. Pt denies bldg/lof/change in discharge, contractions, headache, vision changes, chest pain/SOB or edema. SBAR given to Dr. BASHIR Cabrera, see note in Epic.

## 2024-04-24 ENCOUNTER — HOSPITAL ENCOUNTER (OUTPATIENT)
Dept: CARDIOLOGY | Facility: CLINIC | Age: 34
Discharge: HOME | End: 2024-04-24
Attending: STUDENT IN AN ORGANIZED HEALTH CARE EDUCATION/TRAINING PROGRAM | Admitting: STUDENT IN AN ORGANIZED HEALTH CARE EDUCATION/TRAINING PROGRAM
Payer: COMMERCIAL

## 2024-04-24 DIAGNOSIS — R06.09 DYSPNEA ON EXERTION: ICD-10-CM

## 2024-04-24 LAB — LVEF ECHO: NORMAL

## 2024-04-24 PROCEDURE — 93306 TTE W/DOPPLER COMPLETE: CPT | Mod: 26 | Performed by: INTERNAL MEDICINE

## 2024-04-24 PROCEDURE — 93306 TTE W/DOPPLER COMPLETE: CPT

## 2024-06-23 ENCOUNTER — HOSPITAL ENCOUNTER (OUTPATIENT)
Facility: HOSPITAL | Age: 34
End: 2024-06-23
Admitting: OBSTETRICS & GYNECOLOGY
Payer: COMMERCIAL

## 2024-06-23 ENCOUNTER — HOSPITAL ENCOUNTER (OUTPATIENT)
Facility: HOSPITAL | Age: 34
Discharge: HOME | End: 2024-06-23
Attending: OBSTETRICS & GYNECOLOGY | Admitting: OBSTETRICS & GYNECOLOGY
Payer: COMMERCIAL

## 2024-06-23 VITALS
SYSTOLIC BLOOD PRESSURE: 110 MMHG | DIASTOLIC BLOOD PRESSURE: 65 MMHG | RESPIRATION RATE: 16 BRPM | TEMPERATURE: 97.8 F | OXYGEN SATURATION: 97 %

## 2024-06-23 DIAGNOSIS — Z36.89 ENCOUNTER FOR TRIAGE IN PREGNANT PATIENT: Primary | ICD-10-CM

## 2024-06-23 PROCEDURE — G0463 HOSPITAL OUTPT CLINIC VISIT: HCPCS

## 2024-06-23 RX ORDER — LIDOCAINE 40 MG/G
CREAM TOPICAL
Status: DISCONTINUED | OUTPATIENT
Start: 2024-06-23 | End: 2024-06-23 | Stop reason: HOSPADM

## 2024-06-23 RX ORDER — METRONIDAZOLE 7.5 MG/G
GEL VAGINAL 2 TIMES DAILY
Status: DISCONTINUED | OUTPATIENT
Start: 2024-06-23 | End: 2024-06-23

## 2024-06-23 RX ORDER — METRONIDAZOLE 7.5 MG/G
GEL VAGINAL AT BEDTIME
Status: DISCONTINUED | OUTPATIENT
Start: 2024-06-23 | End: 2024-06-23 | Stop reason: HOSPADM

## 2024-06-23 RX ORDER — METRONIDAZOLE 7.5 MG/G
GEL VAGINAL AT BEDTIME
Qty: 70 G | Refills: 0 | Status: SHIPPED | OUTPATIENT
Start: 2024-06-23 | End: 2024-06-28

## 2024-06-23 ASSESSMENT — ACTIVITIES OF DAILY LIVING (ADL)
ADLS_ACUITY_SCORE: 36
ADLS_ACUITY_SCORE: 21
ADLS_ACUITY_SCORE: 21

## 2024-06-23 NOTE — PROGRESS NOTES
Discharge instructions given and explained to patient, states understanding. Discharged to home with .

## 2024-06-23 NOTE — PROGRESS NOTES
Patient states she has not had a contraction since coming to hospital. Category 1 FHR tracing. Dr. Connors updated. Wet prep on 6/20 was positive for BV per Dr. Connors. Rx will be phoned to patient's pharmacy and discharge order received.

## 2024-06-23 NOTE — PROGRESS NOTES
"Data: [facility] presented to [facility]: 2024  2:54 PM.  Reason for maternal/fetal assessment is uterine contractions. Patient reports she started feeling contractions 3 hours ago, \"about 10 or 11 in the past 2 hours.\" Also states she has had mild lower pelvic pressure. Patient denies leaking of vaginal fluid/rupture of membranes, vaginal bleeding, abdominal pain. Patient reports fetal movement is normal. Patient is a 33w3d .  Prenatal record unavailable, patient receives care at [facility] and plans delivery at [facility]. Reports she had a negative urine culture this past week and had a vaginal swab for \"infections\" but she does not know the result. Patient spoke with Dr. Connors today  who suggested she come to [facility] given her history of delivery at 33 weeks. Pregnancy has been uncomplicated.    Vital signs wnl. Support person is present.     Action: Verbal consent for EFM. Triage assessment completed.     Response: Patient verbalized agreement with plan. Will contact Dr. Connors with update and further orders.      "

## 2024-07-07 ENCOUNTER — HEALTH MAINTENANCE LETTER (OUTPATIENT)
Age: 34
End: 2024-07-07

## 2024-07-08 ENCOUNTER — HOSPITAL ENCOUNTER (OUTPATIENT)
Facility: CLINIC | Age: 34
Discharge: HOME | End: 2024-07-08
Attending: OBSTETRICS & GYNECOLOGY | Admitting: OBSTETRICS & GYNECOLOGY
Payer: COMMERCIAL

## 2024-07-08 ENCOUNTER — HOSPITAL ENCOUNTER (OUTPATIENT)
Facility: CLINIC | Age: 34
End: 2024-07-08
Admitting: OBSTETRICS & GYNECOLOGY
Payer: COMMERCIAL

## 2024-07-08 VITALS
HEIGHT: 70 IN | BODY MASS INDEX: 37.51 KG/M2 | TEMPERATURE: 98.4 F | SYSTOLIC BLOOD PRESSURE: 110 MMHG | DIASTOLIC BLOOD PRESSURE: 60 MMHG | WEIGHT: 262 LBS | RESPIRATION RATE: 16 BRPM | HEART RATE: 86 BPM

## 2024-07-08 LAB
ALBUMIN MFR UR ELPH: 11.5 MG/DL
ALBUMIN SERPL BCG-MCNC: 3.4 G/DL (ref 3.5–5.2)
ALP SERPL-CCNC: 125 U/L (ref 40–150)
ALT SERPL W P-5'-P-CCNC: 16 U/L (ref 0–50)
ANION GAP SERPL CALCULATED.3IONS-SCNC: 9 MMOL/L (ref 7–15)
AST SERPL W P-5'-P-CCNC: 15 U/L (ref 0–45)
BASOPHILS # BLD AUTO: 0 10E3/UL (ref 0–0.2)
BASOPHILS NFR BLD AUTO: 0 %
BILIRUB SERPL-MCNC: 0.2 MG/DL
BUN SERPL-MCNC: 6.3 MG/DL (ref 6–20)
CALCIUM SERPL-MCNC: 9.5 MG/DL (ref 8.6–10)
CHLORIDE SERPL-SCNC: 103 MMOL/L (ref 98–107)
CREAT SERPL-MCNC: 0.61 MG/DL (ref 0.51–0.95)
CREAT UR-MCNC: 83.7 MG/DL
DEPRECATED HCO3 PLAS-SCNC: 23 MMOL/L (ref 22–29)
EGFRCR SERPLBLD CKD-EPI 2021: >90 ML/MIN/1.73M2
EOSINOPHIL # BLD AUTO: 0.1 10E3/UL (ref 0–0.7)
EOSINOPHIL NFR BLD AUTO: 1 %
ERYTHROCYTE [DISTWIDTH] IN BLOOD BY AUTOMATED COUNT: 14.7 % (ref 10–15)
GLUCOSE SERPL-MCNC: 105 MG/DL (ref 70–99)
HCT VFR BLD AUTO: 35 % (ref 35–47)
HGB BLD-MCNC: 11.5 G/DL (ref 11.7–15.7)
IMM GRANULOCYTES # BLD: 0.1 10E3/UL
IMM GRANULOCYTES NFR BLD: 1 %
LYMPHOCYTES # BLD AUTO: 2 10E3/UL (ref 0.8–5.3)
LYMPHOCYTES NFR BLD AUTO: 18 %
MCH RBC QN AUTO: 27.8 PG (ref 26.5–33)
MCHC RBC AUTO-ENTMCNC: 32.9 G/DL (ref 31.5–36.5)
MCV RBC AUTO: 85 FL (ref 78–100)
MONOCYTES # BLD AUTO: 0.5 10E3/UL (ref 0–1.3)
MONOCYTES NFR BLD AUTO: 4 %
NEUTROPHILS # BLD AUTO: 8.5 10E3/UL (ref 1.6–8.3)
NEUTROPHILS NFR BLD AUTO: 76 %
NRBC # BLD AUTO: 0 10E3/UL
NRBC BLD AUTO-RTO: 0 /100
PLATELET # BLD AUTO: 296 10E3/UL (ref 150–450)
POTASSIUM SERPL-SCNC: 4.2 MMOL/L (ref 3.4–5.3)
PROT SERPL-MCNC: 6.3 G/DL (ref 6.4–8.3)
PROT/CREAT 24H UR: 0.14 MG/MG CR (ref 0–0.2)
RBC # BLD AUTO: 4.13 10E6/UL (ref 3.8–5.2)
SODIUM SERPL-SCNC: 135 MMOL/L (ref 135–145)
WBC # BLD AUTO: 11.2 10E3/UL (ref 4–11)

## 2024-07-08 PROCEDURE — 36415 COLL VENOUS BLD VENIPUNCTURE: CPT | Performed by: OBSTETRICS & GYNECOLOGY

## 2024-07-08 PROCEDURE — 85049 AUTOMATED PLATELET COUNT: CPT | Performed by: OBSTETRICS & GYNECOLOGY

## 2024-07-08 PROCEDURE — G0463 HOSPITAL OUTPT CLINIC VISIT: HCPCS

## 2024-07-08 PROCEDURE — 82040 ASSAY OF SERUM ALBUMIN: CPT | Performed by: OBSTETRICS & GYNECOLOGY

## 2024-07-08 PROCEDURE — 84156 ASSAY OF PROTEIN URINE: CPT | Performed by: OBSTETRICS & GYNECOLOGY

## 2024-07-08 RX ORDER — LIDOCAINE 40 MG/G
CREAM TOPICAL
Status: DISCONTINUED | OUTPATIENT
Start: 2024-07-08 | End: 2024-07-08 | Stop reason: HOSPADM

## 2024-07-08 ASSESSMENT — ACTIVITIES OF DAILY LIVING (ADL): ADLS_ACUITY_SCORE: 19

## 2024-07-08 NOTE — DISCHARGE INSTRUCTIONS
Learning About When to Call Your Doctor During Pregnancy (After 20 Weeks)  Overview  It's common to have concerns about what might be a problem when you're pregnant. Most pregnancies don't have any serious problems. But it's still important to know when to call your doctor if you have certain symptoms or signs of labor.  These are general suggestions. Your doctor may give you some more information about when to call.  When to call your doctor (after 20 weeks)  Call 911  anytime you think you may need emergency care. For example, call if:  You have severe vaginal bleeding. This means you are soaking through a pad each hour for 2 or more hours.  You have sudden, severe pain in your belly.  You have chest pain, are short of breath, or cough up blood.  You passed out (lost consciousness).  You have a seizure.  You see or feel the umbilical cord.  You think you are about to deliver your baby and can't make it safely to the hospital or birthing center.  Call your doctor now or seek immediate medical care if:  You have vaginal bleeding.  You have belly pain.  You have a fever.  You are dizzy or lightheaded, or you feel like you may faint.  You have signs of a blood clot in your leg (called a deep vein thrombosis), such as:  Pain in the calf, back of the knee, thigh, or groin.  Swelling in your leg or groin.  A color change on the leg or groin. The skin may be reddish or purplish, depending on your usual skin color.  You have symptoms of preeclampsia, such as:  Sudden swelling of your face, hands, or feet.  New vision problems (such as dimness, blurring, or seeing spots).  A severe headache.  You have a sudden release of fluid from your vagina. (You think your water broke.)  You've been having regular contractions for an hour. This means that you've had at least 6 contractions within 1 hour, even after you change your position and drink fluids.  You notice that your baby has stopped moving or is moving less than  "normal.  You have signs of heart failure, such as:  New or increased shortness of breath.  New or worse swelling in your legs, ankles, or feet.  Sudden weight gain, such as more than 2 to 3 pounds in a day or 5 pounds in a week.  Feeling so tired or weak that you cannot do your usual activities.  You have symptoms of a urinary tract infection. These may include:  Pain or burning when you urinate.  A frequent need to urinate without being able to pass much urine.  Pain in the flank, which is just below the rib cage and above the waist on either side of the back.  Blood in your urine.  Watch closely for changes in your health, and be sure to contact your doctor if:  You have vaginal discharge that smells bad.  You feel sad, anxious, or hopeless for more than a few days.  You have skin changes, such as a rash, itching, or a yellow color to your skin.  You have other concerns about your pregnancy.  If you have labor signs at 37 weeks or more  If you have signs of labor at 37 weeks or more, your doctor may tell you to call when your labor becomes more active. Symptoms of active labor include:  Contractions that are regular.  Contractions that are less than 5 minutes apart.  Contractions that are hard to talk through.  Follow-up care is a key part of your treatment and safety. Be sure to make and go to all appointments, and call your doctor if you are having problems. It's also a good idea to know your test results and keep a list of the medicines you take.  Where can you learn more?  Go to https://www.[employer].net/patiented  Enter N531 in the search box to learn more about \"Learning About When to Call Your Doctor During Pregnancy (After 20 Weeks).\"  Current as of: July 10, 2023               Content Version: 14.0    5971-0865 [employer].   Care instructions adapted under license by your healthcare professional. If you have questions about a medical condition or this instruction, always ask your healthcare " professional. [email or web address] disclaims any warranty or liability for your use of this information.    Preeclampsia: Care Instructions  Preeclampsia is high blood pressure and signs of organ damage, usually after 20 weeks of pregnancy. If it's not managed, it can harm you or your baby and lead to dangerous seizures (eclampsia).    Most people with preeclampsia have healthy babies. Preeclampsia usually goes away in the weeks after birth.    In rare cases, symptoms of preeclampsia don't show up until days or weeks after childbirth.    Monitor yourself for symptoms of preeclampsia.  Call your doctor if you have symptoms such as a severe headache, vision changes, or sudden swelling in your face and hands.     Keep track of your blood pressure at home if your doctor asks you to.  Ask your doctor to make sure that the monitor is working and that you're using it right. Follow instructions about when to take your blood pressure and what to avoid before taking your blood pressure.     Take medicines exactly as prescribed.  You may need to take medicine to control your blood pressure.     Don't smoke.  If you smoke, quit or cut back as much as you can. If you need help quitting, talk to your doctor.     Gain a healthy amount of weight.  Talk with your doctor about how much weight gain is healthy for you. Gaining too much weight while you're pregnant may be harmful.   When should you call for help?  Share this information with your partner or a friend. They can help you watch for warning signs.  Call 911  anytime you think you may need emergency care. For example, call if:    You passed out (lost consciousness).     You have a seizure.     You have trouble breathing.     You have chest pain.   Call your doctor now or seek immediate medical care if:    You have symptoms of preeclampsia, such as:  Sudden swelling of your face, hands, or feet.  New vision problems (such as dimness, blurring, or seeing spots).  A severe  "headache.     Your blood pressure is very high, such as 160/110 or higher.     Your blood pressure is higher than your doctor told you it should be, or it rises quickly.     You have any vaginal bleeding.     You have new nausea or vomiting.     You think that you are in labor.     You have pain in your belly or pelvis.     You gain weight rapidly.   Follow-up care is a key part of your treatment and safety. Be sure to make and go to all appointments, and call your doctor if you are having problems. It's also a good idea to know your test results and keep a list of the medicines you take.  Where can you learn more?  Go to https://www.[employer].net/patiented  Enter Z954 in the search box to learn more about \"Preeclampsia: Care Instructions.\"  Current as of: July 10, 2023               Content Version: 14.0    7459-2119 [employer].   Care instructions adapted under license by your healthcare professional. If you have questions about a medical condition or this instruction, always ask your healthcare professional. [employer] disclaims any warranty or liability for your use of this information.        "

## 2024-07-19 ENCOUNTER — TELEPHONE (OUTPATIENT)
Dept: HEMATOLOGY | Facility: CLINIC | Age: 34
End: 2024-07-19
Payer: COMMERCIAL

## 2024-07-19 NOTE — TELEPHONE ENCOUNTER
Was notified by [facility] pharmacy that they received a prescription from outside OB Dr. Calvo for twice daily SQ heparin.  Per chart review patient has been on prophylactic Lovenox throughout her pregnancy and [facility] has been following peripherally as OB and MFM have been directing care.    I called and spoke with and who states she is now 37 weeks pregnant.  She recently had a conversation with Dr. Calvo about the pros and cons of switching over to subcu heparin prior to delivery.  She is leaning towards switching to heparin due to the shorter half-life, making her more likely to have an epidural as an option.    Toma is curious to know if REYNA Flanagan has any recommendations around switching to heparin versus staying on Lovenox.  I told her that I would have Magdalena review to get back to her early next week.    In the meantime I advised that and can fill through [facility] pharmacy now if she would like as she has had a detailed conversation with her OB Dr. Calvo.  I transferred Toma to the [facility] pharmacy line to discuss pricing and timeline of obtaining heparin should she choose to move forward with that.    Oumar LIMON RN  [facility]  Office: [phone]  Clinic: [phone]  Fax: [phone]

## 2024-07-22 NOTE — PROGRESS NOTES
[id number]  Toma Blanco  33 year old female  CBCD Diagnosis: VTE  CBCD Provider: Dennis    Patient called and stated that she got different recommendations about anticoagulation from OB provider and want to review this with provider who call this morning.  SALENA Gibbons gave her the options, but doesn't feel strongly either way.  She can absolutely go with heparin at the guidance of her GYN. She had mentioned that the anesthesiologists recently have been pushing back on the heparin saying it still needs to be 12 hours and normal PTT so she had just called her to tell her that it may not make much of a difference between her Lovenox and heparin.     Reviewed conversation and the remote possibility of HIT (Heparin induced thrombocytopenia). Toma thanked Magdalena and myself for this discussion, and has not quite decided which option she will choose.  Meli Bernard, MSN, RN, PHN -Nurse Clinician, [facility] [phone]

## 2024-08-01 ENCOUNTER — ANESTHESIA EVENT (OUTPATIENT)
Dept: OBGYN | Facility: CLINIC | Age: 34
End: 2024-08-01
Payer: COMMERCIAL

## 2024-08-01 ENCOUNTER — ANESTHESIA (OUTPATIENT)
Dept: OBGYN | Facility: CLINIC | Age: 34
End: 2024-08-01
Payer: COMMERCIAL

## 2024-08-01 PROBLEM — Z86.718 PERSONAL HISTORY OF DVT (DEEP VEIN THROMBOSIS): Status: ACTIVE | Noted: 2024-08-01

## 2024-08-01 PROCEDURE — 250N000009 HC RX 250: Performed by: ANESTHESIOLOGY

## 2024-08-01 PROCEDURE — 250N000011 HC RX IP 250 OP 636: Performed by: ANESTHESIOLOGY

## 2024-08-01 PROCEDURE — 370N000003 HC ANESTHESIA WARD SERVICE: Performed by: ANESTHESIOLOGY

## 2024-08-01 RX ORDER — BUPIVACAINE HYDROCHLORIDE 2.5 MG/ML
INJECTION, SOLUTION EPIDURAL; INFILTRATION; INTRACAUDAL
Status: COMPLETED | OUTPATIENT
Start: 2024-08-01 | End: 2024-08-01

## 2024-08-01 RX ORDER — LIDOCAINE HYDROCHLORIDE AND EPINEPHRINE 15; 5 MG/ML; UG/ML
INJECTION, SOLUTION EPIDURAL PRN
Status: DISCONTINUED | OUTPATIENT
Start: 2024-08-01 | End: 2024-08-02

## 2024-08-01 RX ADMIN — BUPIVACAINE HYDROCHLORIDE 5 ML: 2.5 INJECTION, SOLUTION EPIDURAL; INFILTRATION; INTRACAUDAL at 18:00

## 2024-08-01 RX ADMIN — LIDOCAINE HYDROCHLORIDE,EPINEPHRINE BITARTRATE 3 ML: 15; .005 INJECTION, SOLUTION EPIDURAL; INFILTRATION; INTRACAUDAL; PERINEURAL at 17:56

## 2024-08-01 NOTE — ANESTHESIA PREPROCEDURE EVALUATION
Anesthesia Pre-Procedure Evaluation    Patient: Toma Blanco   MRN: [id number] : 1990        Procedure :   Cervical Ripening       Past Medical History:   Diagnosis Date    Acute deep vein thrombosis (DVT) of proximal vein of left lower extremity (H)     post partum    Anxiety     Hidradenitis suppurativa     Infection due to 2019 novel coronavirus     Liver lesion 2023    Migraine with aura and without status migrainosus, not intractable 2023    Migraine with aura and without status migrainosus, not intractable 2023    Nephrolithiasis     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, antepartum     Plantar fascial fibromatosis of right foot     Thyroid nodule 2023    Varicella       Past Surgical History:   Procedure Laterality Date    DILATION AND CURETTAGE SUCTION N/A 2023    Procedure: DILATION AND CURETTAGE, UTERUS, USING SUCTION;  Surgeon: Lulu Leone MD;  Location: [facility] OR    DILATION AND CURETTAGE SUCTION N/A 2023    Procedure: DILATION AND CURETTAGE, UTERUS, USING SUCTION;  Surgeon: Deborah Zurita MD;  Location: [facility] OR    EXCISE MASS FOOT Right 2023    Procedure: Excision and Neuroplasty of Right Plantar Foot Mass;  Surgeon: Hunter Redding MD;  Location: [facility] OR    [place] teeth extraction        No Known Allergies   Social History     Tobacco Use    Smoking status: Never     Passive exposure: Never    Smokeless tobacco: Never   Substance Use Topics    Alcohol use: Yes     Alcohol/week: 1.0 - 2.0 standard drink of alcohol     Types: 1 - 2 Standard drinks or equivalent per week     Comment: 1-2 twice a week      Wt Readings from Last 1 Encounters:   24 118.8 kg (262 lb)        Anesthesia Evaluation            ROS/MED HX  ENT/Pulmonary:  - neg pulmonary ROS     Neurologic:  - neg neurologic ROS     Cardiovascular: Comment: dvt      METS/Exercise Tolerance:     Hematologic:     (+)  anticoagulation therapy (off lovenox x3 days),             "  Musculoskeletal:       GI/Hepatic:  - neg GI/hepatic ROS     Renal/Genitourinary:       Endo:     (+)          thyroid problem,            Psychiatric/Substance Use:       Infectious Disease:       Malignancy:       Other:            Physical Exam    Airway        Mallampati: II       Respiratory Devices and Support         Dental           Cardiovascular   cardiovascular exam normal          Pulmonary   pulmonary exam normal                OUTSIDE LABS:  CBC:   Lab Results   Component Value Date    WBC 11.6 (H) 08/01/2024    WBC 11.2 (H) 07/08/2024    HGB 13.1 08/01/2024    HGB 11.5 (L) 07/08/2024    HCT 40.1 08/01/2024    HCT 35.0 07/08/2024     08/01/2024     07/08/2024     BMP:   Lab Results   Component Value Date     07/08/2024     08/25/2023    POTASSIUM 4.2 07/08/2024    POTASSIUM 4.0 08/25/2023    CHLORIDE 103 07/08/2024    CHLORIDE 106 08/25/2023    CO2 23 07/08/2024    CO2 21 (L) 08/25/2023    BUN 6.3 07/08/2024    BUN 8 08/25/2023    CR 0.61 07/08/2024    CR 0.65 08/25/2023     (H) 07/08/2024    GLC 89 08/25/2023     COAGS:   Lab Results   Component Value Date    PTT 26 08/01/2024    INR 0.95 08/01/2024     POC: No results found for: \"BGM\", \"HCG\", \"HCGS\"  HEPATIC:   Lab Results   Component Value Date    ALBUMIN 3.4 (L) 07/08/2024    PROTTOTAL 6.3 (L) 07/08/2024    ALT 16 07/08/2024    AST 15 07/08/2024    ALKPHOS 125 07/08/2024    BILITOTAL 0.2 07/08/2024     OTHER:   Lab Results   Component Value Date    TAURUS 9.5 07/08/2024    PHOS 3.2 09/22/2023    MAG 2.2 09/22/2023    LIPASE 29 08/14/2023    TSH 1.29 07/24/2023    T4 1.30 08/29/2023    T3 134 08/29/2023    SED 27 (H) 12/06/2014       Anesthesia Plan    ASA Status:  2       Anesthesia Type: Epidural.              Consents    Anesthesia Plan(s) and associated risks, benefits, and realistic alternatives discussed. Questions answered and patient/representative(s) expressed understanding.     - Discussed:     - Discussed " with:  Patient            Postoperative Care            Comments:           neg OB ROS.      DENISHA HALL MD    I have reviewed the pertinent notes and labs in the chart from the past 30 days and (re)examined the patient.  Any updates or changes from those notes are reflected in this note.     # Hyponatremia: Lowest Na = 135 mmol/L in last 30 days, will monitor as appropriate      # Hypoalbuminemia: Lowest albumin = 3.4 g/dL in the past 30 days , will monitor as appropriate   # Drug Induced Coagulation Defect: home medication list includes an anticoagulant medication  # Drug Induced Platelet Defect: home medication list includes an antiplatelet medication

## 2024-08-01 NOTE — ANESTHESIA PROCEDURE NOTES
"Epidural catheter Procedure Note    Pre-Procedure   Staff -        Anesthesiologist:  Raheem Marks MD       Performed By: anesthesiologist       Location: OB       Procedure Start/Stop Times: 8/1/2024 5:45 PM and 8/1/2024 6:01 PM       Pre-Anesthestic Checklist: patient identified, IV checked, risks and benefits discussed, informed consent and monitors and equipment checked  Timeout:       Correct Patient: Yes        Correct Procedure: Yes        Correct Site: Yes        Correct Position: Yes   Procedure Documentation  Procedure: epidural catheter       Patient Position: sitting       Skin prep: Chloraprep       Local skin infiltrated with mL of 1% lidocaine.        Insertion Site: L3-4. (midline approach).       Technique: LORT saline        NORY at 7.5 cm.       Needle Type: GI-Viewy needle       Needle Gauge: 18.        Needle Length (Inches): 3.5        Catheter: 20 G.          Catheter threaded easily.           Threaded 11 cm at skin.      Assessment/Narrative         Paresthesias: Resolved.       Test dose of 3 mL lidocaine 1.5% w/ 1:200,000 epinephrine at.         Test dose negative, 3 minutes after injection, for signs of intravascular, subdural, or intrathecal injection.       Insertion/Infusion Method: LORT saline       Aspiration negative for Heme or CSF via Epidural Catheter.    Medication(s) Administered   0.25% Bupivacaine PF (Epidural) - EPIDURAL   5 mL - 8/1/2024 6:00:00 PM  Medication Administration Time: 8/1/2024 5:45 PM      FOR [facility] ([facility]) ONLY:   Pain Team Contact information: please page the Pain Team [address] [employer]. Search \"Pain\". During daytime hours, please page the attending first. At night please page the resident first.      "

## 2024-08-02 NOTE — ANESTHESIA POSTPROCEDURE EVALUATION
Patient: Toma Blanco    Procedure: * No procedures listed *  Cervical Ripening    Anesthesia Type:  Epidural    Note:  Disposition: Inpatient   Postop Pain Control: Uneventful            Sign Out: Well controlled pain   PONV: No   Neuro/Psych: Uneventful            Sign Out: Acceptable/Baseline neuro status   Airway/Respiratory: Uneventful            Sign Out: Acceptable/Baseline resp. status   CV/Hemodynamics: Uneventful            Sign Out: Acceptable CV status; No obvious hypovolemia; No obvious fluid overload   Other NRE: NONE   DID A NON-ROUTINE EVENT OCCUR? No    Event details/Postop Comments:  Recovering uneventfully. Denies any headache, nausea, vomiting, numbness, tingling, or weakness.           Last vitals:  Vitals:    08/02/24 1032 08/02/24 1047 08/02/24 1102   BP: 130/69 114/56 118/64   Temp:      SpO2:          Electronically Signed By: Benja Donnelly MD  August 2, 2024  2:17 PM

## 2024-08-05 ENCOUNTER — PATIENT OUTREACH (OUTPATIENT)
Dept: CARE COORDINATION | Facility: CLINIC | Age: 34
End: 2024-08-05
Payer: COMMERCIAL

## 2024-08-05 NOTE — PROGRESS NOTES
[facility]: Transitions of Care Outreach  Chief Complaint   Patient presents with    Clinic Care Coordination - Post Hospital       Most Recent Admission Date: 2024   Most Recent Admission Diagnosis:      Most Recent Discharge Date: 2024   Most Recent Discharge Diagnosis: Personal history of DVT (deep vein thrombosis) - Z86.718   (spontaneous vaginal delivery) - O80     Transitions of Care Assessment    Discharge Assessment  How are you doing now that you are home?: I am doing fine.  How are your symptoms? (Red Flag symptoms escalate to triage hotline per guidelines): Improved  Do you know how to contact your clinic care team if you have future questions or changes to your health status? : Yes  Does the patient have their discharge instructions? : Yes  Does the patient have questions regarding their discharge instructions? : No  Were you started on any new medications or were there changes to any of your previous medications? : Yes (Pt stopped taking 2 medications.)  Do you have all of your needed medical supplies or equipment (DME)?  (i.e. oxygen tank, CPAP, cane, etc.): Yes    Post-op ([facility] Only)  If the patient had a surgery or procedure, do they have any questions for a nurse?: No         [facility] Explained and offered Care Coordination support to eligible patients: Yes    Patient accepted? No    Follow up Plan     Discharge Follow-Up  Discharge follow up appointment scheduled in alignment with recommended follow up timeframe or Transitions of Risk Category? (Low = within 30 days; Moderate= within 14 days; High= within 7 days): No (Pt will call and schedule follow up)  Patient's follow up appointment not scheduled: Patient declined scheduling support. Education on the importance of transitions of care follow up. Provided scheduling phone number.    Future Appointments   Date Time Provider Department Center   10/10/2024  2:30 PM Cesario Garza MD [facility]        Outpatient Plan as outlined on AVS reviewed with patient.    For any urgent concerns, please contact our 24 hour nurse triage line: [phone] ([phone])       TANI Chapman  [phone]  [facility]

## 2024-08-07 PROBLEM — K76.9 LIVER LESION: Status: RESOLVED | Noted: 2023-09-22 | Resolved: 2024-08-07

## 2024-08-07 RX ORDER — NITROFURANTOIN 25; 75 MG/1; MG/1
100 CAPSULE ORAL 2 TIMES DAILY
COMMUNITY
Start: 2024-06-14

## 2024-08-07 NOTE — PROGRESS NOTES
Assessment:    Six day old infant currently at  about 7.5% weight loss today;  gain of 2 oz over last 2 days   Good latch and suck, although baby sleepy at breast and required frequent stimulation to continue, particularly early in feeding   Milk transfer slightly below to baby's needs during observed feeding in office today:  in need of supplementation while building efficiency at breastfeeding  Mother with milk supply becoming established    Plan:    Use good positioning for deep latch, with baby held close to body and baby's head/shoulders/hips in good alignment.  When in a seated position, use a pillow to help bring baby close to breasts, and stepstool to elevate your knees above hips.    When bringing your baby to your breast, compress your breast vertically and in line with baby's mouth--this will help them to get a larger mouthful of breast and a deeper latch. Babies latch best to the breast by bringing their chin in first, so point your nipple towards baby's nose, tuck the chin in close, and then wait for his mouth to open.  When his mouth opens, bring his head in deeply.  Baby's chin should be snugged deeply in your breast, their upper cheeks should be touching the breast, and their nose just out of the breast.   If there is pinching or pain, try using a finger to give a little gentle pressure on his chin to help him open more widely and take in more of your breast.  If it is still painful, use a finger to break the suction, remove baby from the breast and try again until there is no pain with nursing.  There is sometimes a little pain when the baby first begins sucking, but after the first few seconds there should be no pain--only a tugging feeling.   Continue to nurse baby on cue, 8-12 times each day.  Once your baby has returned to their birthweight, you can trust them to awaken when they need to eat--you do not need to awaken them on a schedule.  When you nurse, feed on one side until baby finishes  "swallowing.  Once swallowing slows, use breast compression to encourage more swallowing, but once there is no more active swallowing, and baby is either sleeping, coming off the breast, or just \"nibbling,\" it is OK to use a finger to take baby off the breast and move to the other breast.  Do the same on the other side.  Offer both breasts at each feeding.  It is more important to watch the baby than the clock!    Chacho needs about 19 - 20 oz of milk each day to grow well, or about 2 oz each feeding if he is feeding 10 times/day.  If he nurses at home as he did in the office today, about 10 times/day, he needs about 12 oz per day (or about 35 - 40 ml each feeding) in supplementation, using your breastmilk as your first choice and formula when the supply of pumped milk runs out.  You can give this after feedings, or distributed throughout the day according to his feeding cues.   Sometimes if babies have slow weight gain in early days, they can become more sleepy and less productive at breast, which leads to lower intake, lower milk supply and further poor gain.  Helping Chacho catch up on gain will often lead to more energetic nursing, more intake, and better supply.   Add in some regular pumping, 4-6 times/day to have this extra milk to offer to Chacho and promote strong milk supply.  Sometimes pumping while you have some warmth on your breasts (like a heating pad or microwaved hot pack) is helpful, and gentle massage can also help release more milk.   It is more effective to pump more frequently for shorter time periods than it is to pump less often for longer:  For example, it is better to pump 6 times/day for 15 minutes each than it is to pump 3 times/day for 30 minutes.     Follow up with lactation as needed, and pediatric provider as planned.  [facility] [employer] can be used for brief questions, but it's important to know that messages are not seen Friday through Sunday. If urgent help is needed, Monday through " Friday you can call [phone] and one of our lactation consultants will get the message and respond; if you need a rapid response over a weekend or holiday, it is best to call your on-call maternity or pediatric provider.  Please feel free to schedule a return visit if the concern is more detailed;  telephone visits are also an option if you don't feel you need to be seen in person.       Subjective: Toma is here today because of concerns with baby Chacho' weight gain noted at first pediatric visit.  She also notices that he is sleepy at breast and will often continue to cue after breastfeeding.  Sometimes hears swallowing with feeding.  Due to weight loss she began some supplementation with expressed and donor milk yesterday.  Finally, she has some pain with latch, usually managed with position adjustments, but would like to have latch checked.      Toma is vaccinated for Covid-19 with the first series.    Hospital Course: Induced for history of DVT on anticoagulation;  uncomplicated birth.  Seen by hospital IBCLC for routine support.    Mother's Relevant Med/Surg History: Anxiety;  hidradenitis suppurativa;  migraines;  hx DVT    Breastfeeding Goals: exclusive breastfeeding    Previous Breastfeeding Experience: First child born prematurely with multiple anomalies, fed by Gtube.  Toma exclusively pumped for 6 weeks during which time she struggled with milk supply;  weaned at 6 weeks when she developed a blood clot.    Infant's name: Chacho  Infant's bday: [birth year]  Gestational age: 39w1d  Infant's birth weight: 7 # 14 oz   Discharge weight: 7 # 6 oz     Pediatric Provider: Dr. Mely Murray, [facility]. Toma gives her permission for today's note to be forwarded to Dr. Murray.  ESTELLA signed and filed in Toma's chart as Chacho has no local active pediatric chart.   Recent weights:  8/6/24: 7 # 2 oz       Peq Lactation Visit Questionnaire    8/8/2024 10:21 AM CDT - Filed by Patient   What is your main  concern today? latch, sleepy baby, supply   Your baby's first name: Chacho   Your baby's last name: Francis   Type of Birth Vaginal   Your doctor/midwife: Dr. Lashay Calvo   Baby's doctor or nurse practitioner: Dr.Lindsay Murray   Baby's birthday: 8/2/2025   Birth weight: 7 lbs 14 oz   Baby's weight just before leaving the hospital: 7lbs 6oz   Baby's most recent weight: 7lbs 2oz   Date: 08/06/2024   How often does your baby eat? Every 2-3 hours   How long does each feeding last?  30-60 mins   How much of the time does your baby take both breasts when nursing? 70%   Can you hear the baby swallowing during nursing? Yes   How many times does your baby feed in 24 hours? 10   How many times does your baby urinate (pee) in 24 hours?    How many stools (poops) does your baby have in 24 hours?    Describe the color and consistency of the poop:    Do you give your baby extra milk in addition to or instead of breastfeeding? Breastmilk   How much extra do you usually give? 1oz   How do you give extra milk? Bottle   Are you pumping your breasts? Yes   How often? only a couple of times so far,mostly breast   How much is pumped? 2oz before a feed, 1oz after breast   When you were pregnant did your breasts grow larger? Yes   Did your areola (the dark area around your nipple) grow larger or darker? Yes   Did you notice your breasts fill when your baby was 3-5 days old? Yes   Have you had any breast surgeries? No   Please select any of the following medical conditions you have been previously diagnosed with or are currently being treated for: Anxiety   What else would you like the lactation consultant to know? My oldest is tube fed and i had supply issues wnd never breast fed          Objective/Physical exam:   Her nipples are everted, the areola is compressible, the breast is large, soft and full.  Nipples are intact bilaterally, although left nipple appears to have very small blood blister on superior part of side of  "nipple.    Sore nipples: tender   EPDS: 7, with \"never\" marked for question on thoughts of self-harm     Assessment of infant: 29.56% Weight for age percentile   Age today: 6 days  Today's weight: 7 # 4.4 oz  Amount of milk transferred from LEFT side: 0.4 oz  Amount of milk transferred from RIGHT side: 0.4 oz    Baby has full flexion of arms and legs, normal tone, behavior is alert and active, respirations are normal, skin is normal, hydration is normal, jaw is normal size and alignment, palate is normal, frenulum is normal, baby can lateralize tongue, has adequate tongue lift, and tongue can protrude past bottom gum line. Upper labial frenulum is normal.    Suck exam:  Baby has strong, coordinated suck with good tongue cupping    Baby thrush: none    Jaundice: none      Feeding assessment: Baby can hold suction with tongue while at the breast.     Alignment: The baby was flex relaxed. Baby's head was aligned with its trunk. Baby did face mother. Baby was in cross cradle and modified football/clutch position today (tucked beneath breast, facing upwards towards nipple)  Areolar Grasp: Baby was able to open mouth widely. Baby's lips were not pursed. Baby's lips did flange outward. Tongue was visible over bottom gum. Baby had complete seal.     Areolar Compression: Baby made rhythmic motion. There were no clicking or smacking sounds. There was no severe nipple discomfort. Nipples appeared just slightly asymmetric after feeding.  Audible swallowing: Baby made quiet sounds of swallowing: There was an increase in frequency after milk ejection reflex. The milk ejection reflex is normal and milk supply is becoming established.     /70 (BP Location: Right arm, Patient Position: Sitting, Cuff Size: Adult Large)   Pulse 64   LMP 2023   Breastfeeding Yes   OB History    Para Term  AB Living   4 2 1 1 2 2   SAB IAB Ectopic Multiple Live Births   1 0 0 0 2      # Outcome Date GA Lbr Mikie/2nd Weight Sex " Type Anes PTL Lv   4 Term 24 39w1d 02:00 / 00:13 3.572 kg (7 lb 14 oz) M Vag-Spont EPI N YVONNE      Name: Chacho Nance      Apgar1: 8  Apgar5: 9   3 AB 23 6w2d    SAB         Birth Comments: DEBO/twin pregnancy   2 SAB 23 6w0d    AB, INEVITAB         Birth Comments: DEBO, D&C   1  21 33w0d 08:50 / 00:11 1.49 kg (3 lb 4.6 oz) M Vag-Vacuum  Y YVONNE      Birth Comments: polyhydramnios      Complications: Polyhydramnios      Name: MAGALYS NANCE      Apgar1: 6  Apgar5: 9      Obstetric Comments   First baby: mult anomalies, poly, PTL, FGR; no genetic positive test, was on progesterone injectable first months of pregnancy       Current Outpatient Medications:     acetaminophen (TYLENOL) 500 MG tablet, Take 500-1,000 mg by mouth every 8 hours as needed for mild pain, Disp: , Rfl:     cephALEXin (KEFLEX) 500 MG capsule, Take 500 mg by mouth 2 times daily, Disp: , Rfl:     enoxaparin ANTICOAGULANT (LOVENOX) 40 MG/0.4ML syringe, Inject 0.4 mLs (40 mg) Subcutaneous daily, Disp: 12 mL, Rfl: 7    ibuprofen (ADVIL/MOTRIN) 200 MG tablet, Take 400 mg by mouth every 6 hours as needed for pain, Disp: , Rfl:     MAGNESIUM GLYCINATE PO, Take 400 mg by mouth daily, Disp: , Rfl:     Prenatal Vit-DSS-Fe Cbn-FA (PRENATAL AD PO), Take 1 tablet by mouth daily, Disp: , Rfl:     Vitamin D3 (VITAMIN D, CHOLECALCIFEROL,) 25 mcg (1000 units) tablet, Take 1 tablet by mouth 2 times daily, Disp: , Rfl:     nitroFURantoin macrocrystal-monohydrate (MACROBID) 100 MG capsule, Take 100 mg by mouth 2 times daily (Patient not taking: Reported on 2024), Disp: , Rfl:   Past Medical History:   Diagnosis Date    Acute deep vein thrombosis (DVT) of proximal vein of left lower extremity (H)     post partum    Anxiety     Hidradenitis suppurativa     Infection due to 2019 novel coronavirus     Liver lesion 2023    Migraine with aura and without status migrainosus, not intractable 2023    Migraine with  aura and without status migrainosus, not intractable 2023    Nephrolithiasis     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, antepartum     Plantar fascial fibromatosis of right foot     Thyroid nodule 2023    Varicella      Past Surgical History:   Procedure Laterality Date    DILATION AND CURETTAGE SUCTION N/A 2023    Procedure: DILATION AND CURETTAGE, UTERUS, USING SUCTION;  Surgeon: Lulu Leone MD;  Location: [facility]    DILATION AND CURETTAGE SUCTION N/A 2023    Procedure: DILATION AND CURETTAGE, UTERUS, USING SUCTION;  Surgeon: Deborah Zurita MD;  Location: [facility] OR    EXCISE MASS FOOT Right 2023    Procedure: Excision and Neuroplasty of Right Plantar Foot Mass;  Surgeon: Hunter Redding MD;  Location: [facility] OR    Blanco teeth extraction       Family History   Problem Relation Age of Onset    Thyroid Disease Mother     Obesity Mother     Hypertension Mother     Depression Mother     Diabetes Father     Nephrolithiasis Father         uric acid    Preeclampsia Sister     Diabetes Brother     Depression Brother     No Known Problems Brother     Depression Brother     Other - See Comments Maternal Grandmother         superficial blood clots    Dementia Maternal Grandfather     Heart Disease Maternal Grandfather     Dementia Paternal Grandmother     Diabetes Paternal Grandfather     Heart Failure Paternal Grandfather     Alzheimer Disease Paternal Grandfather      Birth Son     Other - See Comments Son         medically complex, multiple congenital anomalies    Pulmonary Embolism Paternal Uncle         fatal    Pulmonary Embolism Other         fatal    Anesthesia Reaction No family hx of        Time spent:  Chart review/prechartin min prior to day of service  Face-to-face visit:   70 min   Documentation:  20 min  Total time spent on day of service: 90 min    ROSITA Vanessa, CNM, IBCLC

## 2024-08-08 ENCOUNTER — OFFICE VISIT (OUTPATIENT)
Dept: MIDWIFE SERVICES | Facility: CLINIC | Age: 34
End: 2024-08-08
Attending: ADVANCED PRACTICE MIDWIFE
Payer: COMMERCIAL

## 2024-08-08 VITALS — DIASTOLIC BLOOD PRESSURE: 70 MMHG | HEART RATE: 64 BPM | SYSTOLIC BLOOD PRESSURE: 106 MMHG

## 2024-08-08 DIAGNOSIS — O92.79 OTHER DISORDERS OF LACTATION: Primary | ICD-10-CM

## 2024-08-08 PROCEDURE — 99215 OFFICE O/P EST HI 40 MIN: CPT | Performed by: ADVANCED PRACTICE MIDWIFE

## 2024-08-08 PROCEDURE — 99417 PROLNG OP E/M EACH 15 MIN: CPT | Performed by: ADVANCED PRACTICE MIDWIFE

## 2024-08-08 RX ORDER — IBUPROFEN 200 MG
400 TABLET ORAL EVERY 6 HOURS PRN
COMMUNITY

## 2024-08-08 RX ORDER — CEPHALEXIN 500 MG/1
500 CAPSULE ORAL 2 TIMES DAILY
COMMUNITY
Start: 2024-08-06

## 2024-08-08 ASSESSMENT — EDINBURGH POSTNATAL DEPRESSION SCALE (EPDS)
I HAVE BEEN ABLE TO LAUGH AND SEE THE FUNNY SIDE OF THINGS: AS MUCH AS I ALWAYS COULD
I HAVE LOOKED FORWARD WITH ENJOYMENT TO THINGS: RATHER LESS THAN I USED TO
I HAVE BLAMED MYSELF UNNECESSARILY WHEN THINGS WENT WRONG: YES, SOME OF THE TIME
I HAVE BEEN SO UNHAPPY THAT I HAVE BEEN CRYING: ONLY OCCASIONALLY
I HAVE FELT SAD OR MISERABLE: NOT VERY OFTEN
I HAVE FELT SCARED OR PANICKY FOR NO GOOD REASON: NO, NOT MUCH
I HAVE BEEN ANXIOUS OR WORRIED FOR NO GOOD REASON: NO, NOT AT ALL
TOTAL SCORE: 7
THINGS HAVE BEEN GETTING ON TOP OF ME: NO, MOST OF THE TIME I HAVE COPED QUITE WELL
I HAVE BEEN SO UNHAPPY THAT I HAVE HAD DIFFICULTY SLEEPING: NOT AT ALL
THE THOUGHT OF HARMING MYSELF HAS OCCURRED TO ME: NEVER

## 2024-08-08 NOTE — PATIENT INSTRUCTIONS
Average Infant Milk Intake by Age    Age Average milk volume per feeding (mL) Average milk volume per feeding (oz) Average 24 hour milk intake (mL) Average 24 hour milk intake (oz)   Day 1 Few drops - 5mL < tsp Up to 30 mL Up to 1 oz   Day 2 5 - 15 mL <0.5 oz - 1 TB 30 - 120 mL 1 - 4 oz   Day 3 15 - 30 mL  0.5 - 1 oz 120 - 240 mL 4 - 8 oz   Day 4 30 - 45 mL  1 - 1.5 oz 240 - 360 mL 8 - 12 oz   Day 5-7 45 - 60 mL 1.5 - 2 oz 360 - 600 mL 12 - 18 oz   Week 2-3 60 - 90 mL 2 - 3 oz 450 - 750 mL 15 - 25 oz   Months 1-6 90 - 150 mL 3 - 5 oz 750 - 1035 mL 25 - 35 oz            Good breastfeeding resources:    Websites:  www.[employer]Upon  www.[employer]/blog/  [email or web address].org/breastfeeding-info/    Instagram:    [email or web address]  [email or web address]    Books:   The Womanly Art of Breastfeeding by [employer]  Breastfeeding Doesn't Need to Suck, by Lorenza Ward      For help with using baby carriers:  https://babywearingtwincities.org/

## 2024-09-02 ENCOUNTER — TRANSFERRED RECORDS (OUTPATIENT)
Dept: MULTI SPECIALTY CLINIC | Facility: CLINIC | Age: 34
End: 2024-09-02

## 2024-09-02 LAB — PAP SMEAR - HIM PATIENT REPORTED: NORMAL

## 2024-10-02 ENCOUNTER — MYC MEDICAL ADVICE (OUTPATIENT)
Dept: FAMILY MEDICINE | Facility: CLINIC | Age: 34
End: 2024-10-02
Payer: COMMERCIAL

## 2024-10-02 DIAGNOSIS — Z87.440 PERSONAL HISTORY OF URINARY TRACT INFECTION: ICD-10-CM

## 2024-10-02 DIAGNOSIS — Z87.442 HISTORY OF RENAL CALCULI: Primary | ICD-10-CM

## 2024-10-10 ENCOUNTER — OFFICE VISIT (OUTPATIENT)
Dept: DERMATOLOGY | Facility: CLINIC | Age: 34
End: 2024-10-10
Payer: COMMERCIAL

## 2024-10-10 ENCOUNTER — LAB (OUTPATIENT)
Dept: LAB | Facility: CLINIC | Age: 34
End: 2024-10-10
Payer: COMMERCIAL

## 2024-10-10 VITALS — DIASTOLIC BLOOD PRESSURE: 72 MMHG | WEIGHT: 252.3 LBS | SYSTOLIC BLOOD PRESSURE: 107 MMHG | BODY MASS INDEX: 36.2 KG/M2

## 2024-10-10 DIAGNOSIS — L73.2 HIDRADENITIS SUPPURATIVA: ICD-10-CM

## 2024-10-10 DIAGNOSIS — L73.2 HIDRADENITIS SUPPURATIVA: Primary | ICD-10-CM

## 2024-10-10 LAB
EST. AVERAGE GLUCOSE BLD GHB EST-MCNC: 120 MG/DL
HBA1C MFR BLD: 5.8 %

## 2024-10-10 PROCEDURE — 99000 SPECIMEN HANDLING OFFICE-LAB: CPT | Performed by: PATHOLOGY

## 2024-10-10 PROCEDURE — 84630 ASSAY OF ZINC: CPT | Mod: 90 | Performed by: PATHOLOGY

## 2024-10-10 PROCEDURE — 99214 OFFICE O/P EST MOD 30 MIN: CPT | Mod: GC | Performed by: DERMATOLOGY

## 2024-10-10 PROCEDURE — 83036 HEMOGLOBIN GLYCOSYLATED A1C: CPT | Performed by: DERMATOLOGY

## 2024-10-10 PROCEDURE — 36415 COLL VENOUS BLD VENIPUNCTURE: CPT | Performed by: PATHOLOGY

## 2024-10-10 RX ORDER — CEPHALEXIN 500 MG/1
500 CAPSULE ORAL 2 TIMES DAILY
Qty: 28 CAPSULE | Refills: 3 | Status: SHIPPED | OUTPATIENT
Start: 2024-10-10 | End: 2024-10-24

## 2024-10-10 RX ORDER — SPIRONOLACTONE 50 MG/1
50 TABLET, FILM COATED ORAL DAILY
Qty: 90 TABLET | Refills: 3 | Status: SHIPPED | OUTPATIENT
Start: 2024-10-10 | End: 2025-10-10

## 2024-10-10 NOTE — LETTER
10/10/2024       RE: Toma Blanco  [address]  [place] [address]     Dear Colleague,    Thank you for referring your patient, Toma Blanco, to the [facility] at [facility]. Please see a copy of my visit note below.    [facility] Dermatology Note  Encounter Date: Oct 10, 2024  Office Visit     Dermatology Problem List:  1. HS   - Current tx: cephalexin 500 mg; Spironolactone 50mg; Zn?  - Prior tx: Doxycycline and Minocycline (discontinued d/t GI effects), Keflex, Clindamycin lotion, Zinc   2. Perioral dermatitis  - Current tx: metronidazole cream  SHx: historian   ____________________________________________    Assessment & Plan:  # HS  Pt with flare after 3 years of no medication intervention d/t natural family planning and wariness of medication causing birth defects. Shared decision making today with pt having history of responding to Keflex and Spironolactone in the past along with zinc. Likely that her hormonal surges with pregnancies could be the driving factor for her HS flare, which is mild today in clinic, so it is suspected that reaching goal dose of spironolactone will aid in symptoms. Will trial her previous method of treatment starting with 50mg of Spironolactone with goal of increasing to 100mg if tolerated and 14 days of Keflex to improve current symptoms. Did discuss adding on metformin as possible next step as well. Will see back in 12 weeks to assess changes and increase dosing as necessary.    - Discussed several treatment options in detail including steroid injections and Resorcinol.   > Recommended calling to acquire Resorcinol   - Mild flare:   - Start cephalexin 500 mg BID for   - Initiate Spironolactone 50 mg Daily   > Goal of 100mg Daily if tolerating   - Discussed laser hair treatment (letter written for nd-yag laser)   > Will email to help with appointment  - Will  consider metformin at next visit. Pt does report constipation on this med, but will discuss further if no improvement with current regimen.     Follow-up: 12 wks    This pt was seen and staffed with Dr. Greg Shane MD  Internal Medicine PGY3    Staff Physician:  I was present with the medical student who participated in the service and in the documentation of the note. I have verified the history and personally performed the physical exam and medical decision making. I agree with the assessment and plan of care as documented in the note.     Cesario Garza MD    Department of Dermatology  [facility]     ____________________________________________    CC:   Chief Complaint   Patient presents with     Derm Problem     Toma is here for HS follow up. Recently had baby and would like to discuss medication. Not breastfeeding.        HPI:  Ms. Toma Blanco is a(n) 33 year old female who presents today for follow-up  for HS.    Last seen in derm by me on 2/15/24 as a new HS patient to my clinic. Because she was pregnant at the time, started on cephalexin 500 mg BID for mild flare with plan to discuss after she gave birth spironolactone 100 mg and LHR (PA letter written at the time). For perioral dermatitis, she was to start MetroCream BID.    She gave birth to a son on 8/1/24.    Today, she is presenting with worsening symptoms after pregnancy. She states that before her 2 live births, she was controlled on Keflex and spironolactone intermittently with zinc and clindamycin lotion as needed. She tried Doxy and minocycline in the past, but it resulted in intolerable GI symptoms. When she began family planning, she was hesitant about treatment causing birth defects so after her first pregnancy, she continued to not be on any medications. She did have a bad flare of her HS during that time, but a lack of contraceptive use made  here wary of starting anything. She had 2 miscarriages during that time and noticed that her HS continued to progressively worsen. Now that her 2nd delivery was on 8/1/24 and she is feeling more willing to start abx despite not using contraceptions with insight to how her body presents during pregnancy, she is wishing to control her symptoms.    Her usual lesions are in her vulvar area, but those have worsened. She states that her vulvar area has become more inflamed with areas she believes are non-tunneled and possibly draining with spread to her upper thighs and RLQ. She did report improvement on Cephalexin prescribed before her delivery that calmed her symptoms, but it has been mostly intractable with lesions never going away. Pt reports worsening of flare during ovulation period of menstrual cycles.     Patient is otherwise feeling well, without additional skin concerns.    HS Nurse Assessment      2/15/2024    11:09 AM   Nurse Assessment Data   Over the past 30 days how many old lesions flared back up? 4-5   Over the past 30 days how many new lesions did you get? 1   Over the past week, how many dressing changes do you do each day? 0   Over the past week, has your wound drainage been: None   Rate your HS overall from 0 - 10 (0 = no disease, 10 = worst) over the past week:  1   Rate your pain score from 0 - 10 (0 = no disease, 10 = worst) for the most painful/symptomatic lesion in the past week:  4   Over the past week, how much has HS influenced your quality of life? slightly       Physical Exam:  Vitals: LMP 11/02/2023   SKIN: Total skin excluding the undergarment areas was performed. The exam included the head/face, neck, both arms, chest, back, abdomen, both legs, digits and/or nails.   - Rt arm with 2 nodules that are not erythematous or draining.   - Rt thigh with 1 erythematous nodule and some papules.   - Lft thigh with multiple papules with no tunnels or open skin   - Suprapubic area with draining  lesion   - No other lesions of concern on areas examined.   HS Data      2/15/2024    12:04 PM 10/10/2024     2:46 PM   HS Exam Data   LC Type  LC1   Clinical Subtypes  Regular type   Acne? No No   Dissecting Cellulitis? No No   Total Urias Stage  II   Total Inflammatory Nodules 0 2   Total Abcesses 0 0   Total Draining Tunnels 0 0   Total Abscess and Nodule Count 0 2   IHS4 Score  0 2   Total  HASI Score 10 18   HS-PGA  2         Medications:  Current Outpatient Medications   Medication Sig Dispense Refill     acetaminophen (TYLENOL) 500 MG tablet Take 500-1,000 mg by mouth every 8 hours as needed for mild pain       cephALEXin (KEFLEX) 500 MG capsule Take 500 mg by mouth 2 times daily       enoxaparin ANTICOAGULANT (LOVENOX) 40 MG/0.4ML syringe Inject 0.4 mLs (40 mg) Subcutaneous daily 12 mL 7     ibuprofen (ADVIL/MOTRIN) 200 MG tablet Take 400 mg by mouth every 6 hours as needed for pain       MAGNESIUM GLYCINATE PO Take 400 mg by mouth daily       nitroFURantoin macrocrystal-monohydrate (MACROBID) 100 MG capsule Take 100 mg by mouth 2 times daily (Patient not taking: Reported on 2024)       Prenatal Vit-DSS-Fe Cbn-FA (PRENATAL AD PO) Take 1 tablet by mouth daily       Vitamin D3 (VITAMIN D, CHOLECALCIFEROL,) 25 mcg (1000 units) tablet Take 1 tablet by mouth 2 times daily       No current facility-administered medications for this visit.      Past Medical History:   Patient Active Problem List   Diagnosis     Hidradenitis suppurativa     Acute deep vein thrombosis (DVT) of proximal vein of left lower extremity (H)     Vulval hidradenitis suppurativa     History of renal calculi     History of miscarriage     Migraine with aura and without status migrainosus, not intractable     Perioral dermatitis     Thyroid nodule     Encounter for triage in pregnant patient     Personal history of DVT (deep vein thrombosis)      (spontaneous vaginal delivery)     Past Medical History:   Diagnosis Date     Acute deep  vein thrombosis (DVT) of proximal vein of left lower extremity (H)     post partum     Anxiety      Hidradenitis suppurativa      Infection due to 2019 novel coronavirus      Liver lesion 09/22/2023     Migraine with aura and without status migrainosus, not intractable 09/22/2023     Migraine with aura and without status migrainosus, not intractable 09/22/2023     Nephrolithiasis      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, antepartum      Plantar fascial fibromatosis of right foot      Thyroid nodule 09/22/2023     Varicella             Again, thank you for allowing me to participate in the care of your patient.      Sincerely,    Cesario Garza MD

## 2024-10-10 NOTE — NURSING NOTE
Dermatology Rooming Note    Toma Blanco's goals for this visit include:   Chief Complaint   Patient presents with    Derm Problem     Toma is here for HS follow up. Recently had baby and would like to discuss medication. Not breastfeeding.

## 2024-10-10 NOTE — PATIENT INSTRUCTIONS
PLAN  - Start spironolactone 50mg (half pill) for 1 week and if toleratincrease to 100mg daily  - Continue zinc pyrithione shampoo as body wash   - Check labs today  - Flare plan  Resorcinol 15% cream twice a day as needed (mild to severe flare)   [facility]  Address: [address] [place] [address], [place] [address]  Phone: [phone]   Keflex 500mg twice a day for 14 days (moderate to severe flare)  3 month phone visit

## 2024-10-10 NOTE — PROGRESS NOTES
[facility] Dermatology Note  Encounter Date: Oct 10, 2024  Office Visit     Dermatology Problem List:  1. HS   - Current tx: cephalexin 500 mg; Spironolactone 50mg; Zn?  - Prior tx: Doxycycline and Minocycline (discontinued d/t GI effects), Keflex, Clindamycin lotion, Zinc   2. Perioral dermatitis  - Current tx: metronidazole cream  SHx: historian   ____________________________________________    Assessment & Plan:  # HS  Pt with flare after 3 years of no medication intervention d/t natural family planning and wariness of medication causing birth defects. Shared decision making today with pt having history of responding to Keflex and Spironolactone in the past along with zinc. Likely that her hormonal surges with pregnancies could be the driving factor for her HS flare, which is mild today in clinic, so it is suspected that reaching goal dose of spironolactone will aid in symptoms. Will trial her previous method of treatment starting with 50mg of Spironolactone with goal of increasing to 100mg if tolerated and 14 days of Keflex to improve current symptoms. Did discuss adding on metformin as possible next step as well. Will see back in 12 weeks to assess changes and increase dosing as necessary.    - Discussed several treatment options in detail including steroid injections and Resorcinol.   > Recommended calling to acquire Resorcinol   - Mild flare:   - Start cephalexin 500 mg BID for   - Initiate Spironolactone 50 mg Daily   > Goal of 100mg Daily if tolerating   - Discussed laser hair treatment (letter written for nd-yag laser)   > Will email to help with appointment  - Will consider metformin at next visit. Pt does report constipation on this med, but will discuss further if no improvement with current regimen.     Follow-up: 12 wks    This pt was seen and staffed with Dr. Greg Shane MD  Internal Medicine PGY3    Staff Physician:  I was present with the medical student  who participated in the service and in the documentation of the note. I have verified the history and personally performed the physical exam and medical decision making. I agree with the assessment and plan of care as documented in the note.     Cesario Garza MD    Department of Dermatology  [facility]     ____________________________________________    CC:   Chief Complaint   Patient presents with    Derm Problem     Toma is here for HS follow up. Recently had baby and would like to discuss medication. Not breastfeeding.        HPI:  Ms. Toma Blanco is a(n) 33 year old female who presents today for follow-up  for HS.    Last seen in derm by me on 2/15/24 as a new HS patient to my clinic. Because she was pregnant at the time, started on cephalexin 500 mg BID for mild flare with plan to discuss after she gave birth spironolactone 100 mg and LHR (PA letter written at the time). For perioral dermatitis, she was to start MetroCream BID.    She gave birth to a son on 8/1/24.    Today, she is presenting with worsening symptoms after pregnancy. She states that before her 2 live births, she was controlled on Keflex and spironolactone intermittently with zinc and clindamycin lotion as needed. She tried Doxy and minocycline in the past, but it resulted in intolerable GI symptoms. When she began family planning, she was hesitant about treatment causing birth defects so after her first pregnancy, she continued to not be on any medications. She did have a bad flare of her HS during that time, but a lack of contraceptive use made here wary of starting anything. She had 2 miscarriages during that time and noticed that her HS continued to progressively worsen. Now that her 2nd delivery was on 8/1/24 and she is feeling more willing to start abx despite not using contraceptions with insight to how her body presents during pregnancy, she is wishing to control  her symptoms.    Her usual lesions are in her vulvar area, but those have worsened. She states that her vulvar area has become more inflamed with areas she believes are non-tunneled and possibly draining with spread to her upper thighs and RLQ. She did report improvement on Cephalexin prescribed before her delivery that calmed her symptoms, but it has been mostly intractable with lesions never going away. Pt reports worsening of flare during ovulation period of menstrual cycles.     Patient is otherwise feeling well, without additional skin concerns.    HS Nurse Assessment      2/15/2024    11:09 AM   Nurse Assessment Data   Over the past 30 days how many old lesions flared back up? 4-5   Over the past 30 days how many new lesions did you get? 1   Over the past week, how many dressing changes do you do each day? 0   Over the past week, has your wound drainage been: None   Rate your HS overall from 0 - 10 (0 = no disease, 10 = worst) over the past week:  1   Rate your pain score from 0 - 10 (0 = no disease, 10 = worst) for the most painful/symptomatic lesion in the past week:  4   Over the past week, how much has HS influenced your quality of life? slightly       Physical Exam:  Vitals: LMP 11/02/2023   SKIN: Total skin excluding the undergarment areas was performed. The exam included the head/face, neck, both arms, chest, back, abdomen, both legs, digits and/or nails.   - Rt arm with 2 nodules that are not erythematous or draining.   - Rt thigh with 1 erythematous nodule and some papules.   - Lft thigh with multiple papules with no tunnels or open skin   - Suprapubic area with draining lesion   - No other lesions of concern on areas examined.   HS Data      2/15/2024    12:04 PM 10/10/2024     2:46 PM   HS Exam Data   LC Type  LC1   Clinical Subtypes  Regular type   Acne? No No   Dissecting Cellulitis? No No   Total Urias Stage  II   Total Inflammatory Nodules 0 2   Total Abcesses 0 0   Total Draining Tunnels 0 0    Total Abscess and Nodule Count 0 2   IHS4 Score  0 2   Total  HASI Score 10 18   HS-PGA  2         Medications:  Current Outpatient Medications   Medication Sig Dispense Refill    acetaminophen (TYLENOL) 500 MG tablet Take 500-1,000 mg by mouth every 8 hours as needed for mild pain      cephALEXin (KEFLEX) 500 MG capsule Take 500 mg by mouth 2 times daily      enoxaparin ANTICOAGULANT (LOVENOX) 40 MG/0.4ML syringe Inject 0.4 mLs (40 mg) Subcutaneous daily 12 mL 7    ibuprofen (ADVIL/MOTRIN) 200 MG tablet Take 400 mg by mouth every 6 hours as needed for pain      MAGNESIUM GLYCINATE PO Take 400 mg by mouth daily      nitroFURantoin macrocrystal-monohydrate (MACROBID) 100 MG capsule Take 100 mg by mouth 2 times daily (Patient not taking: Reported on 2024)      Prenatal Vit-DSS-Fe Cbn-FA (PRENATAL AD PO) Take 1 tablet by mouth daily      Vitamin D3 (VITAMIN D, CHOLECALCIFEROL,) 25 mcg (1000 units) tablet Take 1 tablet by mouth 2 times daily       No current facility-administered medications for this visit.      Past Medical History:   Patient Active Problem List   Diagnosis    Hidradenitis suppurativa    Acute deep vein thrombosis (DVT) of proximal vein of left lower extremity (H)    Vulval hidradenitis suppurativa    History of renal calculi    History of miscarriage    Migraine with aura and without status migrainosus, not intractable    Perioral dermatitis    Thyroid nodule    Encounter for triage in pregnant patient    Personal history of DVT (deep vein thrombosis)     (spontaneous vaginal delivery)     Past Medical History:   Diagnosis Date    Acute deep vein thrombosis (DVT) of proximal vein of left lower extremity (H)     post partum    Anxiety     Hidradenitis suppurativa     Infection due to 2019 novel coronavirus     Liver lesion 2023    Migraine with aura and without status migrainosus, not intractable 2023    Migraine with aura and without status migrainosus, not intractable 2023     Nephrolithiasis     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, antepartum     Plantar fascial fibromatosis of right foot     Thyroid nodule 09/22/2023    Varicella

## 2024-10-11 LAB — ZINC SERPL-MCNC: 56.1 UG/DL

## 2024-10-19 DIAGNOSIS — E55.9 VITAMIN D DEFICIENCY: Primary | ICD-10-CM

## 2024-10-19 RX ORDER — ZINC GLUCONATE 50 MG
100 TABLET ORAL DAILY
Qty: 90 TABLET | Refills: 0 | Status: SHIPPED | OUTPATIENT
Start: 2024-10-19

## 2024-12-10 ENCOUNTER — VIRTUAL VISIT (OUTPATIENT)
Dept: UROLOGY | Facility: CLINIC | Age: 34
End: 2024-12-10
Attending: FAMILY MEDICINE
Payer: COMMERCIAL

## 2024-12-10 VITALS — HEIGHT: 70 IN | BODY MASS INDEX: 34.36 KG/M2 | WEIGHT: 240 LBS

## 2024-12-10 DIAGNOSIS — Z87.440 PERSONAL HISTORY OF URINARY TRACT INFECTION: ICD-10-CM

## 2024-12-10 DIAGNOSIS — Z87.442 HISTORY OF RENAL CALCULI: ICD-10-CM

## 2024-12-10 ASSESSMENT — PAIN SCALES - GENERAL: PAINLEVEL_OUTOF10: NO PAIN (0)

## 2024-12-10 NOTE — NURSING NOTE
Current patient location: [address] [place] [address]    Is the patient currently in the state of [place]? YES    Visit mode:VIDEO    If the visit is dropped, the patient can be reconnected by:VIDEO VISIT: Send to e-mail at: [email or web address]    Will anyone else be joining the visit? NO  (If patient encounters technical issues they should call [phone]    Are changes needed to the allergy or medication list? No    Are refills needed on medications prescribed by this physician? NO    Rooming Documentation:  Questionnaire(s) completed    Reason for visit: Consult    Guillermina PRETTY

## 2024-12-10 NOTE — LETTER
12/10/2024       RE: Toma Blanco  [address] [place] [address]     Dear Colleague,    Thank you for referring your patient, Toma Blanco, to the [facility] [place] at [facility]. Please see a copy of my visit note below.          UROLOGY OUTPATIENT VISIT      Chief Complaint:   Kidney stones      Synopsis    Toma Blanco is a very pleasant AGE: 33 year old year old person    She has a history of recurrent kidney stones.  She experienced her first stone event in 2021 which was managed conservatively.  She later had ureteroscopy for an obstructing stone in 2023.  Stone was calcium oxalate.  More recently she was taken for CT scan shortly after pregnancy this past August which showed small bilateral nonobstructing renal stones.  She is in the process of transferring all of her medical care to the [facility] system and is being referred for ongoing management and discussion regarding renal stones.  She is currently asymptomatic from a stone perspective.  She did have a urinary tract infection while pregnant but has not had any definitive signs or symptoms of UTI since then.  She does note that her urine does appear occasionally cloudy but it is not malodorous and she has no dysuria.  Her father has a history of uric acid stones.    She has previously undergone metabolic testing in the form of 24-hour urine which was personally reviewed.  There were no obvious severe abnormalities.  She is not on any specific dietary plan in regards to kidney stone prevention.    CT from August was personally reviewed and there are very tiny stones bilaterally in the kidneys each about 2 mm or less and nonobstructing.           Medications     Current Outpatient Medications   Medication Sig Dispense Refill     acetaminophen (TYLENOL) 500 MG tablet Take 500-1,000 mg by mouth every 8 hours as needed for mild pain (Patient not taking:  Reported on 10/10/2024)       cephALEXin (KEFLEX) 500 MG capsule Take 500 mg by mouth 2 times daily (Patient not taking: Reported on 10/10/2024)       enoxaparin ANTICOAGULANT (LOVENOX) 40 MG/0.4ML syringe Inject 0.4 mLs (40 mg) Subcutaneous daily (Patient not taking: Reported on 10/10/2024) 12 mL 7     ibuprofen (ADVIL/MOTRIN) 200 MG tablet Take 400 mg by mouth every 6 hours as needed for pain (Patient not taking: Reported on 10/10/2024)       MAGNESIUM GLYCINATE PO Take 400 mg by mouth daily (Patient not taking: Reported on 10/10/2024)       nitroFURantoin macrocrystal-monohydrate (MACROBID) 100 MG capsule Take 100 mg by mouth 2 times daily (Patient not taking: Reported on 8/8/2024)       Prenatal Vit-DSS-Fe Cbn-FA (PRENATAL AD PO) Take 1 tablet by mouth daily (Patient not taking: Reported on 10/10/2024)       Probiotic Product (PROBIOTIC DAILY PO) Take by mouth.       spironolactone (ALDACTONE) 50 MG tablet Take 1 tablet (50 mg) by mouth daily. 90 tablet 3     Vitamin D3 (VITAMIN D, CHOLECALCIFEROL,) 25 mcg (1000 units) tablet Take 1 tablet by mouth 2 times daily (Patient not taking: Reported on 10/10/2024)       zinc gluconate 50 MG tablet Take 2 tablets (100 mg) by mouth daily. 90 tablet 0     No current facility-administered medications for this visit.         The following  distinct labs were reviewed    I personally reviewed all applicable laboratory data and went over findings with patient  Significant for:    CBC RESULTS:  Recent Labs   Lab Test 08/03/24  0629 08/02/24  0944 08/01/24  1517 07/08/24  1606 12/22/23  1801 08/29/23  0830   WBC  --   --  11.6* 11.2* 11.2* 8.4   HGB 11.7  --  13.1 11.5* 12.3 12.2   PLT  --  270 289 296 360 289        BMP RESULTS:  Recent Labs   Lab Test 08/02/24  0944 07/08/24  1606 08/25/23  1942 08/14/23  1154 04/08/23  2115   NA  --  135 138 137 141   POTASSIUM  --  4.2 4.0 3.8 4.2   CHLORIDE  --  103 106 108* 106   CO2  --  23 21* 21* 23   ANIONGAP  --  9 11 8 12   GLC  --   105* 89 90 125   BUN  --  6.3 8 9 13   CR 0.55 0.61 0.65 0.65 0.80   GFRESTIMATED >90 >90 >90 >90 >90       CALCIUM RESULTS:  Recent Labs   Lab Test 07/08/24  1606 08/25/23  1942 08/14/23  1154 04/08/23  2115   TAURUS 9.5 9.2 9.2 9.6     HGB A1C RESULTS:  Lab Results   Component Value Date    A1C 5.8 10/10/2024       UA RESULTS:   Recent Labs   Lab Test 08/21/23  0857 08/14/23  1215 04/08/23 2014   SG 1.015 1.009 1.032*   URINEPH 6.5 6.5 5.5   NITRITE Negative Negative Negative   RBCU  --  <1 1   WBCU  --  1 2              Assessment/Plan   33 year old year old person with small nonobstructing renal stones.  -Discussed that surgical treatment of small nonobstructing stones is of limited benefit especially in the setting of her being asymptomatic.  We discussed red flag symptoms for stone growth or movement.  We will make shared decision to monitor her with renal ultrasound and KUB in approximately 3 months.  This would be to ensure no significant change to existing stone burden.  Will also update a urinalysis today given history of cloudy urine.  We discussed dietary prevention efforts against calcium oxalate stones.    CC:  Alesia Harris          Again, thank you for allowing me to participate in the care of your patient.      Sincerely,    Benja Upton MD

## 2024-12-10 NOTE — PROGRESS NOTES
UROLOGY OUTPATIENT VISIT      Chief Complaint:   Kidney stones      Synopsis    Toma Blanco is a very pleasant AGE: 33 year old year old person    She has a history of recurrent kidney stones.  She experienced her first stone event in 2021 which was managed conservatively.  She later had ureteroscopy for an obstructing stone in 2023.  Stone was calcium oxalate.  More recently she was taken for CT scan shortly after pregnancy this past August which showed small bilateral nonobstructing renal stones.  She is in the process of transferring all of her medical care to the [facility] system and is being referred for ongoing management and discussion regarding renal stones.  She is currently asymptomatic from a stone perspective.  She did have a urinary tract infection while pregnant but has not had any definitive signs or symptoms of UTI since then.  She does note that her urine does appear occasionally cloudy but it is not malodorous and she has no dysuria.  Her father has a history of uric acid stones.    She has previously undergone metabolic testing in the form of 24-hour urine which was personally reviewed.  There were no obvious severe abnormalities.  She is not on any specific dietary plan in regards to kidney stone prevention.    CT from August was personally reviewed and there are very tiny stones bilaterally in the kidneys each about 2 mm or less and nonobstructing.           Medications     Current Outpatient Medications   Medication Sig Dispense Refill    acetaminophen (TYLENOL) 500 MG tablet Take 500-1,000 mg by mouth every 8 hours as needed for mild pain (Patient not taking: Reported on 10/10/2024)      cephALEXin (KEFLEX) 500 MG capsule Take 500 mg by mouth 2 times daily (Patient not taking: Reported on 10/10/2024)      enoxaparin ANTICOAGULANT (LOVENOX) 40 MG/0.4ML syringe Inject 0.4 mLs (40 mg) Subcutaneous daily (Patient not taking: Reported on 10/10/2024) 12 mL 7    ibuprofen  (ADVIL/MOTRIN) 200 MG tablet Take 400 mg by mouth every 6 hours as needed for pain (Patient not taking: Reported on 10/10/2024)      MAGNESIUM GLYCINATE PO Take 400 mg by mouth daily (Patient not taking: Reported on 10/10/2024)      nitroFURantoin macrocrystal-monohydrate (MACROBID) 100 MG capsule Take 100 mg by mouth 2 times daily (Patient not taking: Reported on 8/8/2024)      Prenatal Vit-DSS-Fe Cbn-FA (PRENATAL AD PO) Take 1 tablet by mouth daily (Patient not taking: Reported on 10/10/2024)      Probiotic Product (PROBIOTIC DAILY PO) Take by mouth.      spironolactone (ALDACTONE) 50 MG tablet Take 1 tablet (50 mg) by mouth daily. 90 tablet 3    Vitamin D3 (VITAMIN D, CHOLECALCIFEROL,) 25 mcg (1000 units) tablet Take 1 tablet by mouth 2 times daily (Patient not taking: Reported on 10/10/2024)      zinc gluconate 50 MG tablet Take 2 tablets (100 mg) by mouth daily. 90 tablet 0     No current facility-administered medications for this visit.         The following  distinct labs were reviewed    I personally reviewed all applicable laboratory data and went over findings with patient  Significant for:    CBC RESULTS:  Recent Labs   Lab Test 08/03/24  0629 08/02/24  0944 08/01/24  1517 07/08/24  1606 12/22/23  1801 08/29/23  0830   WBC  --   --  11.6* 11.2* 11.2* 8.4   HGB 11.7  --  13.1 11.5* 12.3 12.2   PLT  --  270 289 296 360 289        BMP RESULTS:  Recent Labs   Lab Test 08/02/24  0944 07/08/24  1606 08/25/23  1942 08/14/23  1154 04/08/23  2115   NA  --  135 138 137 141   POTASSIUM  --  4.2 4.0 3.8 4.2   CHLORIDE  --  103 106 108* 106   CO2  --  23 21* 21* 23   ANIONGAP  --  9 11 8 12   GLC  --  105* 89 90 125   BUN  --  6.3 8 9 13   CR 0.55 0.61 0.65 0.65 0.80   GFRESTIMATED >90 >90 >90 >90 >90       CALCIUM RESULTS:  Recent Labs   Lab Test 07/08/24  1606 08/25/23  1942 08/14/23  1154 04/08/23  2115   TAURUS 9.5 9.2 9.2 9.6     HGB A1C RESULTS:  Lab Results   Component Value Date    A1C 5.8 10/10/2024       UA  RESULTS:   Recent Labs   Lab Test 08/21/23  0857 08/14/23  1215 04/08/23 2014   SG 1.015 1.009 1.032*   URINEPH 6.5 6.5 5.5   NITRITE Negative Negative Negative   RBCU  --  <1 1   WBCU  --  1 2              Assessment/Plan   33 year old year old person with small nonobstructing renal stones.  -Discussed that surgical treatment of small nonobstructing stones is of limited benefit especially in the setting of her being asymptomatic.  We discussed red flag symptoms for stone growth or movement.  We will make shared decision to monitor her with renal ultrasound and KUB in approximately 3 months.  This would be to ensure no significant change to existing stone burden.  Will also update a urinalysis today given history of cloudy urine.  We discussed dietary prevention efforts against calcium oxalate stones.    CC:  Alesia Harris

## 2024-12-20 ENCOUNTER — HOSPITAL ENCOUNTER (OUTPATIENT)
Dept: ULTRASOUND IMAGING | Facility: CLINIC | Age: 34
Discharge: HOME | End: 2024-12-20
Attending: UROLOGY | Admitting: UROLOGY
Payer: COMMERCIAL

## 2024-12-20 DIAGNOSIS — Z87.442 HISTORY OF RENAL CALCULI: ICD-10-CM

## 2024-12-20 PROCEDURE — 76770 US EXAM ABDO BACK WALL COMP: CPT

## 2025-01-13 PROBLEM — Z36.89 ENCOUNTER FOR TRIAGE IN PREGNANT PATIENT: Status: RESOLVED | Noted: 2024-06-23 | Resolved: 2025-01-13

## 2025-01-13 NOTE — PROGRESS NOTES
Assessment/Plan:   Toma is a 34 year old female here for physical with acute concerns    Routine adult health maintenance  Physical completed today.  Labs as below.  Up-to-date with immunizations.  Following with OB for Pap smear.  - Basic metabolic panel  (Ca, Cl, CO2, Creat, Gluc, K, Na, BUN)  - CBC with platelets  - Vitamin D deficiency screening    Lipid screening  Given age and weight, will check lipid panel today.  - Lipid panel reflex to direct LDL Fasting    Diabetes mellitus screening  Given age and weight, screen for diabetes today.  Was in the prediabetes range on last check.  - Hemoglobin A1c    Hepatitis B vaccination status unknown  Hepatitis B vaccine status unknown, check titer with rest of labs today.  - Hepatitis B Surface Antibody    Cloudy urine  Patient having issues with cloudy urine, has seen urology for this, we will recheck UA/UC today to determine if infection is present.  - UA Macroscopic with reflex to Microscopic and Culture - Lab Collect  - UA Microscopic with Reflex to Culture    Vaginal discharge  Patient concerned for BV, wet prep obtained and will treat if needed.  - Wet prep - Clinic Collect    Acute pain of right knee  Acute pain of left knee  Patient having issues with bilateral knee pain, somewhat worse on the right and the right knee is very loud/clicking, will evaluate with x-rays today, anticipate referral to PT.  - XR Knee Right 1/2 Views  - XR Knee Left 1/2 Views       I have had an Advance Directives discussion with the patient.  The following high BMI interventions were performed this visit: encouragement to exercise and lifestyle education regarding diet    Follow up: 1 year for physical, sooner as needed    Alesia Harris MD  [facility]      Subjective:     Toma Blanco is a 34 year old female who presents for an annual exam.     Question 1/15/2025 10:06 AM CST - Filed by Aurora Faust MA   In general, how would you rate your overall physical  health? Good   Do you get at least 3 servings of foods that have calcium each day (dairy, green leafy vegetables, etc)? Yes   Do you have a special diet? Carbohydrate counting   How many servings of fruits and vegetables do you eat daily? 4 or more   On average, how many sweetened beverages do you drink each day (Examples: soda, juice, sweet tea, etc.)? Do NOT count diet or artificially sweetened beverages. 0-1   On average, how many days per week do you engage in moderate to strenuous exercise (like a brisk walk)? 5 days   On average, how many minutes do you engage in exercise at this level? 30 min   How often do you get together with friends or relatives? Twice a week   Do you see a dentist two times every year? Yes   Do you feel stress - tense, restless, nervous, or anxious, or unable to sleep at night because your mind is troubled all the time - these days? Not at all   If you drink alcohol, do you typically have more than 3 drinks per day OR more than 7 drinks per week? Yes Abnormal    Do you use any substances not prescribed by a provider, out of habit or for other reasons? No   Have you had any new sexual partners since you were last tested for sexually transmitted infections, including HIV? No   Do you have any questions about contraception (birth control) or family planning? No   Within the past 12 months, did you worry that your food would run out before you got money to buy more? No   Within the past 12 months, did the food you bought just not last and you didn t have money to get more? No   Do you have housing? (Housing is defined as stable permanent housing and does not include staying ouside in a car, in a tent, in an abandoned building, in an overnight shelter, or couch-surfing.) Yes   Are you worried about losing your housing? No   Within the past 12 months, has lack of transportation kept you from medical appointments, getting your medicines, non-medical meetings or appointments, work, or from  getting things that you need? No   Within the past 12 months, have you or your family members you live with been unable to get utilities (heat, electricity) when it was really needed? No   Were you born outside of the [place]? No     Question 1/15/2025 10:06 AM CST - Filed by Aurora Faust MA   Please use the following picture for reference only: Drawing on 1/15/2025 at 10:05 AM CST   1.  How often do you have a drink containing alcohol? Monthly or less   2.  How many drinks containing alcohol do you have on a typical day when you are drinking? 1 or 2   3.  How often do you have five or more drinks on one occasion? Never   Audit 2/3 Score (range: 0 - 8) 0     Question 1/15/2025 10:06 AM CST - Filed by Aurora Faust MA   Please use the following picture for reference only: Drawing on 1/15/2025 at 10:05 AM CST     4.  How often during the last year have you found that you were not able to stop drinking once you had started? Never   5.  How often during the last year have you failed to do what was normally expected of you because of drinking? Never   6.  How often during the last year have you needed a first drink in the morning to get yourself going after a heavy drinking session? Never   7.  How often during the last year have you had a feeling of guilt or remorse after drinking? Never   8.  How often during the last year have you been unable to remember what happened the night before because of your drinking? Never     Question 1/15/2025 10:06 AM CST - Filed by Aurora Faust MA   Please use the following picture for reference only: Drawing on 1/15/2025 at 10:05 AM CST     9.  Have you or someone else been injured because of your drinking? No   10. Has a relative, friend, doctor or other health care worker been concerned about your drinking or suggested you cut down? No   TOTAL SCORE - AUDIT (range: 0 - 40) 1     Question 1/15/2025 10:06 AM CST - Filed by Aurora Faust MA   The following questionnaire  should only be answered by the patient. Are you the patient? Yes   Over the last 2 weeks, how often have you been bothered by any of the following problems?    Q1: Little interest or pleasure in doing things Not at all   Q2: Feeling down, depressed or hopeless Not at all   PHQ2 (   1999 [employer],ALL RIGHTS RESERVED. USED WITH PERMISSION. DEVELOPED BY AMY RADFORD,MARY ANN JACOBS,BRENTON ACEVEDO AND COLLEAGUES,WITH AN EDUCATIONAL THIERRY FROM [employer].)    PHQ-2 Score (range: 0 - 6) 0     Had baby in August - was on lovenox, pregnancy/labor went well - no longer breastfeeding    Found out prediabetic from derm labs - making some lifestyle changes to help with this - limiting sugar/carbs, doing intermittent fasting - hoping to lose weight before having another kid (thinking maybe this summer)    Worried about BV - did the treatment  - sometimes fishy odor, pain with sex and with tampons (burning sensation), discharge noted  -cloudy urine - seeing urologist d/t hx kidney stones - didn't recommend treating based on her results    Migraines with aura - for a few months was having significant R sided headaches, no aura usually, this was on top of your normal migraines with aura - has been ok the last few weeks - migraines with aura sometimes 3/month and then nothing for a few months, this last year maybe 4-5 total - the other headaches was every other day for a while, now let us, seems connected to her muscles/tension    Knee pain - R side - loud cracking, on knee cap, sometimes will swell    Health Maintenance reviewed:  Lipid Profile: yes  Glucose Screen: yes  Colonoscopy: no  Mammogram: no    Gynecologic History  Patient's last menstrual period was 01/08/2025 (approximate).  Contraception: none  Last Pap: 2024. Results were: nml - due 3-5 yrs, pt unsure    Immunization History   Administered Date(s) Administered    COVID-19 Monovalent 18+ (Moderna) 10/13/2021, 01/20/2022    TDAP (Adacel,Boostrix)  2018, 2021, 05/15/2024     Immunization status: up to date and documented.    No current outpatient medications on file.     Past Medical History:   Diagnosis Date    Acute deep vein thrombosis (DVT) of proximal vein of left lower extremity (H)     post partum    Anxiety     Hidradenitis suppurativa     Infection due to 2019 novel coronavirus     Liver lesion 2023    Migraine with aura and without status migrainosus, not intractable 2023    Migraine with aura and without status migrainosus, not intractable 2023    Nephrolithiasis     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, antepartum     Plantar fascial fibromatosis of right foot     Thyroid nodule 2023    Varicella      Past Surgical History:   Procedure Laterality Date    DILATION AND CURETTAGE SUCTION N/A 2023    Procedure: DILATION AND CURETTAGE, UTERUS, USING SUCTION;  Surgeon: Lulu Leone MD;  Location: [facility] OR    DILATION AND CURETTAGE SUCTION N/A 2023    Procedure: DILATION AND CURETTAGE, UTERUS, USING SUCTION;  Surgeon: Deborah Zurita MD;  Location: [facility] OR    EXCISE MASS FOOT Right 2023    Procedure: Excision and Neuroplasty of Right Plantar Foot Mass;  Surgeon: Hunter Redding MD;  Location: [facility] OR    [place] teeth extraction       Patient has no known allergies.  Family History   Problem Relation Age of Onset    Thyroid Disease Mother     Obesity Mother     Hypertension Mother     Depression Mother     Diabetes Father     Nephrolithiasis Father         uric acid    Preeclampsia Sister     Diabetes Brother     Depression Brother     No Known Problems Brother     Depression Brother     Other - See Comments Maternal Grandmother         superficial blood clots    Dementia Maternal Grandfather     Heart Disease Maternal Grandfather     Dementia Paternal Grandmother     Diabetes Paternal Grandfather     Heart Failure Paternal Grandfather     Alzheimer Disease Paternal Grandfather       Birth Son     Other - See Comments Son         medically complex, multiple congenital anomalies    Pulmonary Embolism Paternal Uncle         fatal    Pulmonary Embolism Other         fatal    Anesthesia Reaction No family hx of      Social History     Socioeconomic History    Marital status:      Spouse name: Not on file    Number of children: Not on file    Years of education: Not on file    Highest education level: Not on file   Occupational History    Not on file   Tobacco Use    Smoking status: Never     Passive exposure: Never    Smokeless tobacco: Never   Vaping Use    Vaping status: Never Used   Substance and Sexual Activity    Alcohol use: Yes     Alcohol/week: 1.0 - 2.0 standard drink of alcohol     Types: 1 - 2 Standard drinks or equivalent per week     Comment: 1-2 twice a week    Drug use: Never    Sexual activity: Not Currently     Partners: Male   Other Topics Concern    Not on file   Social History Narrative    Not on file     Social Drivers of Health     Financial Resource Strain: Low Risk  (1/15/2025)    Financial Resource Strain     Within the past 12 months, have you or your family members you live with been unable to get utilities (heat, electricity) when it was really needed?: No   Food Insecurity: Low Risk  (1/15/2025)    Food Insecurity     Within the past 12 months, did you worry that your food would run out before you got money to buy more?: No     Within the past 12 months, did the food you bought just not last and you didn t have money to get more?: No   Transportation Needs: Low Risk  (1/15/2025)    Transportation Needs     Within the past 12 months, has lack of transportation kept you from medical appointments, getting your medicines, non-medical meetings or appointments, work, or from getting things that you need?: No   Physical Activity: Sufficiently Active (1/15/2025)    Exercise Vital Sign     Days of Exercise per Week: 5 days     Minutes of Exercise per Session: 30 min  "  Stress: No Stress Concern Present (1/15/2025)    [employer] [place] [employer] - Occupational Stress Questionnaire     Feeling of Stress : Not at all   Social Connections: Unknown (1/15/2025)    Social Connection and Isolation Panel [NHANES]     Frequency of Communication with Friends and Family: Not on file     Frequency of Social Gatherings with Friends and Family: Twice a week     Attends Mu-ism Services: Not on file     Active Member of Clubs or Organizations: Not on file     Attends Club or Organization Meetings: Not on file     Marital Status: Not on file   Interpersonal Safety: Low Risk  (1/15/2025)    Interpersonal Safety     Do you feel physically and emotionally safe where you currently live?: Yes     Within the past 12 months, have you been hit, slapped, kicked or otherwise physically hurt by someone?: No     Within the past 12 months, have you been humiliated or emotionally abused in other ways by your partner or ex-partner?: No   Housing Stability: Low Risk  (1/15/2025)    Housing Stability     Do you have housing? : Yes     Are you worried about losing your housing?: No       Review of Systems negative unless noted    Objective:        Vitals:    01/15/25 1007   BP: 110/70   Pulse: 69   Resp: 18   Temp: 98.1  F (36.7  C)   TempSrc: Temporal   SpO2: 96%   Weight: 111.9 kg (246 lb 11.2 oz)   Height: 1.778 m (5' 10\")   PainSc: No Pain (0)     Body mass index is 35.4 kg/m .    Physical Exam:  General Appearance: Alert, pleasant, appears stated age  Head: Normocephalic, without obvious abnormality  Eyes: PERRL, conjunctiva/corneas clear, EOM's intact  Ears: Normal TM's and external ear canals, both ears  Nose: Nares normal, septum midline,mucosa normal, no drainage  Throat: Lips, mucosa, and tongue normal; teeth and gums normal; oropharynx is clear  Neck: Supple,without lymphadenopathy, no thyromegaly or nodules noted  Lungs: Clear to auscultation bilaterally, respirations unlabored, no " wheezing or crackles  Heart: Regular rate and rhythm, no murmur   Abdomen: Soft, non-tender, no masses, no organomegaly  Extremities: Extremities with strong and symmetric pulses, no cyanosis or edema - loud clicking of R knee when going up step  Skin: Skin color, texture normal, no rashes or lesions  Neurologic: Grossly normal, no focal deficits

## 2025-01-15 ENCOUNTER — ANCILLARY PROCEDURE (OUTPATIENT)
Dept: GENERAL RADIOLOGY | Facility: CLINIC | Age: 35
End: 2025-01-15
Attending: FAMILY MEDICINE
Payer: COMMERCIAL

## 2025-01-15 ENCOUNTER — OFFICE VISIT (OUTPATIENT)
Dept: FAMILY MEDICINE | Facility: CLINIC | Age: 35
End: 2025-01-15
Payer: COMMERCIAL

## 2025-01-15 VITALS
HEIGHT: 70 IN | BODY MASS INDEX: 35.32 KG/M2 | SYSTOLIC BLOOD PRESSURE: 110 MMHG | HEART RATE: 69 BPM | TEMPERATURE: 98.1 F | OXYGEN SATURATION: 96 % | DIASTOLIC BLOOD PRESSURE: 70 MMHG | WEIGHT: 246.7 LBS | RESPIRATION RATE: 18 BRPM

## 2025-01-15 DIAGNOSIS — Z00.00 ROUTINE ADULT HEALTH MAINTENANCE: Primary | ICD-10-CM

## 2025-01-15 DIAGNOSIS — M25.561 ACUTE PAIN OF RIGHT KNEE: ICD-10-CM

## 2025-01-15 DIAGNOSIS — Z78.9 HEPATITIS B VACCINATION STATUS UNKNOWN: ICD-10-CM

## 2025-01-15 DIAGNOSIS — N89.8 VAGINAL DISCHARGE: ICD-10-CM

## 2025-01-15 DIAGNOSIS — Z13.1 DIABETES MELLITUS SCREENING: ICD-10-CM

## 2025-01-15 DIAGNOSIS — R82.90 CLOUDY URINE: ICD-10-CM

## 2025-01-15 DIAGNOSIS — Z13.220 LIPID SCREENING: ICD-10-CM

## 2025-01-15 DIAGNOSIS — M25.562 ACUTE PAIN OF LEFT KNEE: ICD-10-CM

## 2025-01-15 LAB
ALBUMIN UR-MCNC: NEGATIVE MG/DL
ANION GAP SERPL CALCULATED.3IONS-SCNC: 10 MMOL/L (ref 7–15)
APPEARANCE UR: CLEAR
BACTERIA #/AREA URNS HPF: ABNORMAL /HPF
BILIRUB UR QL STRIP: NEGATIVE
BUN SERPL-MCNC: 11.9 MG/DL (ref 6–20)
CALCIUM SERPL-MCNC: 9.6 MG/DL (ref 8.8–10.4)
CHLORIDE SERPL-SCNC: 103 MMOL/L (ref 98–107)
CHOLEST SERPL-MCNC: 176 MG/DL
CLUE CELLS: PRESENT
COLOR UR AUTO: YELLOW
CREAT SERPL-MCNC: 0.74 MG/DL (ref 0.51–0.95)
EGFRCR SERPLBLD CKD-EPI 2021: >90 ML/MIN/1.73M2
ERYTHROCYTE [DISTWIDTH] IN BLOOD BY AUTOMATED COUNT: 12.1 % (ref 10–15)
EST. AVERAGE GLUCOSE BLD GHB EST-MCNC: 108 MG/DL
FASTING STATUS PATIENT QL REPORTED: ABNORMAL
FASTING STATUS PATIENT QL REPORTED: NORMAL
GLUCOSE SERPL-MCNC: 93 MG/DL (ref 70–99)
GLUCOSE UR STRIP-MCNC: NEGATIVE MG/DL
HBA1C MFR BLD: 5.4 % (ref 0–5.6)
HCO3 SERPL-SCNC: 25 MMOL/L (ref 22–29)
HCT VFR BLD AUTO: 43 % (ref 35–47)
HDLC SERPL-MCNC: 46 MG/DL
HGB BLD-MCNC: 14.6 G/DL (ref 11.7–15.7)
HGB UR QL STRIP: NEGATIVE
KETONES UR STRIP-MCNC: NEGATIVE MG/DL
LDLC SERPL CALC-MCNC: 111 MG/DL
LEUKOCYTE ESTERASE UR QL STRIP: ABNORMAL
MCH RBC QN AUTO: 28.2 PG (ref 26.5–33)
MCHC RBC AUTO-ENTMCNC: 34 G/DL (ref 31.5–36.5)
MCV RBC AUTO: 83 FL (ref 78–100)
NITRATE UR QL: NEGATIVE
NONHDLC SERPL-MCNC: 130 MG/DL
PH UR STRIP: 6.5 [PH] (ref 5–8)
PLATELET # BLD AUTO: 337 10E3/UL (ref 150–450)
POTASSIUM SERPL-SCNC: 4.1 MMOL/L (ref 3.4–5.3)
RBC # BLD AUTO: 5.17 10E6/UL (ref 3.8–5.2)
RBC #/AREA URNS AUTO: ABNORMAL /HPF
SODIUM SERPL-SCNC: 138 MMOL/L (ref 135–145)
SP GR UR STRIP: 1.01 (ref 1–1.03)
SQUAMOUS #/AREA URNS AUTO: ABNORMAL /LPF
TRICHOMONAS, WET PREP: ABNORMAL
TRIGL SERPL-MCNC: 94 MG/DL
UROBILINOGEN UR STRIP-ACNC: 0.2 E.U./DL
VIT D+METAB SERPL-MCNC: 35 NG/ML (ref 20–50)
WBC # BLD AUTO: 6.2 10E3/UL (ref 4–11)
WBC #/AREA URNS AUTO: ABNORMAL /HPF
WBC'S/HIGH POWER FIELD, WET PREP: ABNORMAL
YEAST, WET PREP: ABNORMAL

## 2025-01-15 PROCEDURE — 99213 OFFICE O/P EST LOW 20 MIN: CPT | Mod: 25 | Performed by: FAMILY MEDICINE

## 2025-01-15 PROCEDURE — 80061 LIPID PANEL: CPT | Performed by: FAMILY MEDICINE

## 2025-01-15 PROCEDURE — 85027 COMPLETE CBC AUTOMATED: CPT | Performed by: FAMILY MEDICINE

## 2025-01-15 PROCEDURE — 36415 COLL VENOUS BLD VENIPUNCTURE: CPT | Performed by: FAMILY MEDICINE

## 2025-01-15 PROCEDURE — 86706 HEP B SURFACE ANTIBODY: CPT | Performed by: FAMILY MEDICINE

## 2025-01-15 PROCEDURE — 80048 BASIC METABOLIC PNL TOTAL CA: CPT | Performed by: FAMILY MEDICINE

## 2025-01-15 PROCEDURE — 73560 X-RAY EXAM OF KNEE 1 OR 2: CPT | Mod: TC | Performed by: RADIOLOGY

## 2025-01-15 PROCEDURE — 87210 SMEAR WET MOUNT SALINE/INK: CPT | Performed by: FAMILY MEDICINE

## 2025-01-15 PROCEDURE — 99395 PREV VISIT EST AGE 18-39: CPT | Performed by: FAMILY MEDICINE

## 2025-01-15 PROCEDURE — 82306 VITAMIN D 25 HYDROXY: CPT | Performed by: FAMILY MEDICINE

## 2025-01-15 PROCEDURE — G2211 COMPLEX E/M VISIT ADD ON: HCPCS | Performed by: FAMILY MEDICINE

## 2025-01-15 PROCEDURE — 81001 URINALYSIS AUTO W/SCOPE: CPT | Performed by: FAMILY MEDICINE

## 2025-01-15 PROCEDURE — 83036 HEMOGLOBIN GLYCOSYLATED A1C: CPT | Performed by: FAMILY MEDICINE

## 2025-01-15 SDOH — HEALTH STABILITY: PHYSICAL HEALTH: ON AVERAGE, HOW MANY DAYS PER WEEK DO YOU ENGAGE IN MODERATE TO STRENUOUS EXERCISE (LIKE A BRISK WALK)?: 5 DAYS

## 2025-01-15 SDOH — HEALTH STABILITY: PHYSICAL HEALTH: ON AVERAGE, HOW MANY MINUTES DO YOU ENGAGE IN EXERCISE AT THIS LEVEL?: 30 MIN

## 2025-01-15 ASSESSMENT — PAIN SCALES - GENERAL: PAINLEVEL_OUTOF10: NO PAIN (0)

## 2025-01-15 ASSESSMENT — SOCIAL DETERMINANTS OF HEALTH (SDOH): HOW OFTEN DO YOU GET TOGETHER WITH FRIENDS OR RELATIVES?: TWICE A WEEK

## 2025-01-16 LAB
HBV SURFACE AB SERPL IA-ACNC: 26.9 M[IU]/ML
HBV SURFACE AB SERPL IA-ACNC: REACTIVE M[IU]/ML

## 2025-01-23 ENCOUNTER — THERAPY VISIT (OUTPATIENT)
Dept: PHYSICAL THERAPY | Facility: REHABILITATION | Age: 35
End: 2025-01-23
Attending: FAMILY MEDICINE
Payer: COMMERCIAL

## 2025-01-23 DIAGNOSIS — M25.562 ACUTE PAIN OF LEFT KNEE: ICD-10-CM

## 2025-01-23 DIAGNOSIS — M25.561 ACUTE PAIN OF RIGHT KNEE: ICD-10-CM

## 2025-01-23 DIAGNOSIS — R29.898 LEG WEAKNESS, BILATERAL: Primary | ICD-10-CM

## 2025-01-23 ASSESSMENT — ACTIVITIES OF DAILY LIVING (ADL)
STAND: ACTIVITY IS NOT DIFFICULT
WALK: ACTIVITY IS NOT DIFFICULT
PAIN: THE SYMPTOM AFFECTS MY ACTIVITY SLIGHTLY
STIFFNESS: I DO NOT HAVE THE SYMPTOM
SIT WITH YOUR KNEE BENT: ACTIVITY IS NOT DIFFICULT
HOW_WOULD_YOU_RATE_THE_OVERALL_FUNCTION_OF_YOUR_KNEE_DURING_YOUR_USUAL_DAILY_ACTIVITIES?: NEARLY NORMAL
STIFFNESS: I DO NOT HAVE THE SYMPTOM
GIVING WAY, BUCKLING OR SHIFTING OF KNEE: I DO NOT HAVE THE SYMPTOM
HOW_WOULD_YOU_RATE_THE_CURRENT_FUNCTION_OF_YOUR_KNEE_DURING_YOUR_USUAL_DAILY_ACTIVITIES_ON_A_SCALE_FROM_0_TO_100_WITH_100_BEING_YOUR_LEVEL_OF_KNEE_FUNCTION_PRIOR_TO_YOUR_INJURY_AND_0_BEING_THE_INABILITY_TO_PERFORM_ANY_OF_YOUR_USUAL_DAILY_ACTIVITIES?: 85
RISE FROM A CHAIR: ACTIVITY IS NOT DIFFICULT
KNEE_ACTIVITY_OF_DAILY_LIVING_SUM: 63
HOW_WOULD_YOU_RATE_THE_CURRENT_FUNCTION_OF_YOUR_KNEE_DURING_YOUR_USUAL_DAILY_ACTIVITIES_ON_A_SCALE_FROM_0_TO_100_WITH_100_BEING_YOUR_LEVEL_OF_KNEE_FUNCTION_PRIOR_TO_YOUR_INJURY_AND_0_BEING_THE_INABILITY_TO_PERFORM_ANY_OF_YOUR_USUAL_DAILY_ACTIVITIES?: 85
GO DOWN STAIRS: ACTIVITY IS NOT DIFFICULT
KNEEL ON THE FRONT OF YOUR KNEE: ACTIVITY IS NOT DIFFICULT
AS_A_RESULT_OF_YOUR_KNEE_INJURY,_HOW_WOULD_YOU_RATE_YOUR_CURRENT_LEVEL_OF_DAILY_ACTIVITY?: NEARLY NORMAL
PAIN: THE SYMPTOM AFFECTS MY ACTIVITY SLIGHTLY
WEAKNESS: I DO NOT HAVE THE SYMPTOM
SWELLING: I HAVE THE SYMPTOM BUT IT DOES NOT AFFECT MY ACTIVITY
SQUAT: ACTIVITY IS SOMEWHAT DIFFICULT
RISE FROM A CHAIR: ACTIVITY IS NOT DIFFICULT
SWELLING: I HAVE THE SYMPTOM BUT IT DOES NOT AFFECT MY ACTIVITY
SQUAT: ACTIVITY IS SOMEWHAT DIFFICULT
HOW_WOULD_YOU_RATE_THE_OVERALL_FUNCTION_OF_YOUR_KNEE_DURING_YOUR_USUAL_DAILY_ACTIVITIES?: NEARLY NORMAL
WEAKNESS: I DO NOT HAVE THE SYMPTOM
LIMPING: I DO NOT HAVE THE SYMPTOM
PLEASE_INDICATE_YOR_PRIMARY_REASON_FOR_REFERRAL_TO_THERAPY:: KNEE
RAW_SCORE: 63
GIVING WAY, BUCKLING OR SHIFTING OF KNEE: I DO NOT HAVE THE SYMPTOM
KNEEL ON THE FRONT OF YOUR KNEE: ACTIVITY IS NOT DIFFICULT
GO DOWN STAIRS: ACTIVITY IS NOT DIFFICULT
STAND: ACTIVITY IS NOT DIFFICULT
WALK: ACTIVITY IS NOT DIFFICULT
SIT WITH YOUR KNEE BENT: ACTIVITY IS NOT DIFFICULT
AS_A_RESULT_OF_YOUR_KNEE_INJURY,_HOW_WOULD_YOU_RATE_YOUR_CURRENT_LEVEL_OF_DAILY_ACTIVITY?: NEARLY NORMAL
KNEE_ACTIVITY_OF_DAILY_LIVING_SCORE: 90
GO UP STAIRS: ACTIVITY IS SOMEWHAT DIFFICULT
LIMPING: I DO NOT HAVE THE SYMPTOM
GO UP STAIRS: ACTIVITY IS SOMEWHAT DIFFICULT

## 2025-01-23 NOTE — PROGRESS NOTES
PHYSICAL THERAPY EVALUATION  Type of Visit: Evaluation     Fall Risk Screen:  Fall screen completed by: PT  Have you fallen 2 or more times in the past year?: No  Have you fallen and had an injury in the past year?: No  Is patient a fall risk?: No    Subjective         Presenting condition or subjective complaint: Pt reports that she is having knee pain and crepitus in both of her knees but R.>L.  She does have a history of knee injury while dancing and twisted her knee about 6-7 years ago.  Her imaging was fine and she did 3 PT sessions and then quit.  She has pain with squatting, stairs, bent knee positions.  Pain in in the knee cap and above.  She has tried to start strengtheing her knees in November - full body strength training - squats cause pain.  She is avoiding lunges but will squat.  Her current x-rays are negative. Family h/o bad joints.  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) kidney stone renoval (march 2023)    Prior diagnostic imaging/testing results: MRI; X-ray     Prior therapy history for the same diagnosis, illness or injury: Yes      Prior Level of Function  Transfers: Independent  Ambulation: Independent  ADL: Independent    Living Environment  Social support: With family members   Type of home: House; 2-story; Basement   Stairs to enter the home: Yes       Ramp: No   Stairs inside the home: Yes 2 Is there a railing: Yes     Help at home: None  Equipment owned:       Employment: No    Hobbies/Interests: Cooking, literature, education    Patient goals for therapy: walk up stairs and squat pain free    Pain assessment: Pain present     Objective   KNEE EVALUATION  PAIN: Pain Quality: Aching, Sharp, Shooting, and crepitus   INTEGUMENTARY (edema, incisions):  very min swelling?    POSTURE: WNL    GAIT:  Gait Deviations:  WNL    BALANCE/PROPRIOCEPTION:  very min decrease in SLS     WEIGHTBEARING ALIGNMENT: WNL    ROM:   AROM Left Right   Hip Flexion WNL WNL   Hip Extension WNL WNL   Hip Abduction WNL WNL   Hip Adduction     Hip External Rotation WNL WNL   Hip Internal Rotation WNL WNL   Knee Extension WNL WNL   Knee flexion WNL WNL     STRENGTH:    Left Right   Hip Flexion 5 5   Hip Extension 4 4   Glut Max 4 4   Hip Abduction 4+ 4+   Hip Adduction 5 5   Hip External Rotation     Hip Internal Rotation     Knee Extension 5 5   Knee flexion 5 5     FLEXIBILITY:  min decrease in quads     SPECIAL TESTS:   Meniscus Left Right   Thessaly Test - -   Mary's Test - -   Flexion Overpressure - -   Extension Overpressure - -   Joint Line Tenderness - -   Ligamentous Integrity     Valgus Stress at 30  and 0  - -   Varus Stress 30  and 0  - -   Anterior Drawer - -   Posterior Drawer - -   Posterior Sag     Lachman Test - -   Patella     Patellar Apprehension     Patellar Grind - +   Other     Noble's Compression     Jayla     Other       FUNCTIONAL TESTS:  Squat technique overall good     PALPATION:  minimal tenderness over R patella   JOINT MOBILITY:  patella mobility fairly good - signif crepitus on R       Assessment & Plan   CLINICAL IMPRESSIONS  Medical Diagnosis: Acute pain of right knee  Acute pain of left knee    Treatment Diagnosis: R>L knee pain and weakness   Impression/Assessment:  Pt presents to PT with R>L knee pain that has progressively increased over several years, but she is noticing more pain since she started exercising in November 2024.  Overall, the pt does have fairly good LE strength in all gross muscles.  She has full knee ROM.  She does have significant patella crepitus with patella movement and increased pain.  Squatting technique and SLS minimally limited.  Overall decreased R quad control and some muscle tightness.  She will benefit from PT to address these impairments and reduce pain with activity.       Clinical  Decision Making (Complexity):  Clinical Presentation: Stable/Uncomplicated  Clinical Presentation Rationale: based on medical and personal factors listed in PT evaluation  Clinical Decision Making (Complexity): Low complexity    PLAN OF CARE  Treatment Interventions:  Modalities: Cryotherapy, Dry Needling, E-stim, Hot Pack  Interventions: Gait Training, Manual Therapy, Neuromuscular Re-education, Therapeutic Activity, Therapeutic Exercise, Self-Care/Home Management    Long Term Goals     PT Goal 1  Goal Identifier: [place]  Goal Description: Pt will demonstrate readiness for independent sx management and HEP  Rationale: to maximize safety and independence within the home  Target Date: 04/22/25  PT Goal 2  Goal Identifier: Stairs  Goal Description: Pt will be able to go up/down stairs with decreased crepitus and knee pain </=4/10  Rationale: to maximize safety and independence with performance of ADLs and functional tasks  Target Date: 04/22/25  PT Goal 3  Goal Identifier: Exercising/Squats  Goal Description: Pt will be able to exercise including performing squats with knee pain </=4/10 with improved technique and strength  Rationale: to maximize safety and independence with performance of ADLs and functional tasks  Target Date: 04/22/25      Frequency of Treatment: 1x/week  Duration of Treatment: 90 days    Recommended Referrals to Other Professionals:  None   Education Assessment:   Learner/Method: Patient;Demonstration;Pictures/Video  Education Comments: Pt educated on POC, pathology, etc    Risks and benefits of evaluation/treatment have been explained.   Patient/Family/caregiver agrees with Plan of Care.     Evaluation Time:     PT Eval, Low Complexity Minutes (61373): 20     Signing Clinician: Deborah Barnes PT

## 2025-02-14 ENCOUNTER — HOSPITAL ENCOUNTER (OUTPATIENT)
Dept: RADIOLOGY | Facility: CLINIC | Age: 35
Discharge: HOME | End: 2025-02-14
Attending: UROLOGY | Admitting: UROLOGY
Payer: COMMERCIAL

## 2025-02-14 DIAGNOSIS — Z87.442 HISTORY OF RENAL CALCULI: ICD-10-CM

## 2025-02-14 PROCEDURE — 74019 RADEX ABDOMEN 2 VIEWS: CPT

## 2025-02-25 ENCOUNTER — OFFICE VISIT (OUTPATIENT)
Dept: PEDIATRICS | Facility: CLINIC | Age: 35
End: 2025-02-25
Payer: COMMERCIAL

## 2025-02-25 VITALS
WEIGHT: 246.91 LBS | BODY MASS INDEX: 35.35 KG/M2 | TEMPERATURE: 98 F | HEART RATE: 90 BPM | HEIGHT: 70 IN | DIASTOLIC BLOOD PRESSURE: 69 MMHG | OXYGEN SATURATION: 100 % | SYSTOLIC BLOOD PRESSURE: 107 MMHG | RESPIRATION RATE: 14 BRPM

## 2025-02-25 DIAGNOSIS — J02.9 ACUTE PHARYNGITIS, UNSPECIFIED ETIOLOGY: Primary | ICD-10-CM

## 2025-02-25 LAB
DEPRECATED S PYO AG THROAT QL EIA: POSITIVE
FLUAV AG SPEC QL IA: NEGATIVE
FLUBV AG SPEC QL IA: NEGATIVE

## 2025-02-25 PROCEDURE — 99214 OFFICE O/P EST MOD 30 MIN: CPT | Performed by: INTERNAL MEDICINE

## 2025-02-25 PROCEDURE — 3074F SYST BP LT 130 MM HG: CPT | Performed by: INTERNAL MEDICINE

## 2025-02-25 PROCEDURE — 3078F DIAST BP <80 MM HG: CPT | Performed by: INTERNAL MEDICINE

## 2025-02-25 PROCEDURE — 87635 SARS-COV-2 COVID-19 AMP PRB: CPT | Performed by: INTERNAL MEDICINE

## 2025-02-25 PROCEDURE — 87880 STREP A ASSAY W/OPTIC: CPT | Performed by: INTERNAL MEDICINE

## 2025-02-25 PROCEDURE — 87804 INFLUENZA ASSAY W/OPTIC: CPT | Performed by: INTERNAL MEDICINE

## 2025-02-25 RX ORDER — AMOXICILLIN 500 MG/1
500 TABLET, FILM COATED ORAL 2 TIMES DAILY
Qty: 20 TABLET | Refills: 0 | Status: SHIPPED | OUTPATIENT
Start: 2025-02-25 | End: 2025-03-07

## 2025-02-25 ASSESSMENT — ENCOUNTER SYMPTOMS: SORE THROAT: 1

## 2025-02-25 NOTE — PROGRESS NOTES
"  Assessment & Plan     Acute pharyngitis, unspecified etiology  Strep confirmed today.  Antibiotics prescribed.  - Influenza A & B Antigen - Clinic Collect  - COVID-19 Virus (Coronavirus) by PCR Nose  - Streptococcus A Rapid Screen w/Reflex to PCR - Clinic Collect  - amoxicillin (AMOXIL) 500 MG tablet; Take 1 tablet (500 mg) by mouth 2 times daily for 10 days.          BMI  Estimated body mass index is 35.43 kg/m  as calculated from the following:    Height as of this encounter: 1.778 m (5' 10\").    Weight as of this encounter: 112 kg (246 lb 14.6 oz).         See Patient Instructions    Subjective   Toma is a 34 year old, presenting for the following health issues:  Pharyngitis      2/25/2025    10:31 AM   Additional Questions   Roomed by GIOVANNI BLANDON     Via the Health Maintenance questionnaire, the patient has reported the following services have been completed -Cervical Cancer Screening: Karen Davidson 2024-09-02, this information has been sent to the abstraction team.  Pharyngitis     History of Present Illness       Reason for visit:  Strep throat  Symptom onset:  3-7 days ago  Symptoms include:  Severe sore throat, fever  Symptom intensity:  Severe  Symptom progression:  Worsening  Had these symptoms before:  Yes  Has tried/received treatment for these symptoms:  Yes  Previous treatment was successful:  Yes  Prior treatment description:  Antibiotics   She is taking medications regularly.     Sore throat started over weekend.  Got bad day 2 and 3.  Fever yesterday.  Lymph nodes swollen.  Best friend had strep last week.  Has a preschooler and baby.        All other systems on a 10-point review are negative, unless otherwise noted in HPI        Objective    /69 (BP Location: Right arm, Patient Position: Sitting, Cuff Size: Adult Large)   Pulse 90   Temp 98  F (36.7  C) (Temporal)   Resp 14   Ht 1.778 m (5' 10\")   Wt 112 kg (246 lb 14.6 oz)   LMP 02/02/2025 (Approximate)   SpO2 100%   BMI 35.43 kg/m  "   Body mass index is 35.43 kg/m .  Physical Exam   GENERAL: alert and no distress  HENT: normal cephalic/atraumatic, ear canals and TM's normal, nose and mouth without ulcers or lesions, tonsillar hypertrophy, tonsillar erythema, and tonsillar exudate  NECK: no adenopathy, no asymmetry, masses, or scars  RESP: lungs clear to auscultation - no rales, rhonchi or wheezes  MS: no gross musculoskeletal defects noted, no edema    Results for orders placed or performed in visit on 02/25/25   Influenza A & B Antigen - Clinic Collect     Status: Normal    Specimen: Nose; Swab   Result Value Ref Range    Influenza A antigen Negative Negative    Influenza B antigen Negative Negative    Narrative    Test results must be correlated with clinical data. If necessary, results should be confirmed by a molecular assay or viral culture.   Streptococcus A Rapid Screen w/Reflex to PCR - Clinic Collect     Status: Abnormal    Specimen: Throat; Swab   Result Value Ref Range    Group A Strep antigen Positive (A) Negative           Signed Electronically by: Trish Garcia MD

## 2025-02-25 NOTE — PATIENT INSTRUCTIONS
Sore Throat: Care Instructions  Overview     Infection by bacteria or a virus causes most sore throats. Cigarette smoke, dry air, air pollution, allergies, and yelling can also cause a sore throat. Sore throats can be painful and annoying. Fortunately, most sore throats go away on their own. If you have a bacterial infection, your doctor may prescribe antibiotics.  Follow-up care is a key part of your treatment and safety. Be sure to make and go to all appointments, and call your doctor if you are having problems. It's also a good idea to know your test results and keep a list of the medicines you take.  How can you care for yourself at home?  If your doctor prescribed antibiotics, take them as directed. Do not stop taking them just because you feel better. You need to take the full course of antibiotics.  Gargle with warm salt water several times a day to help reduce swelling and relieve pain. Mix 1/2 teaspoon of salt in 1 cup of warm water.  Take an over-the-counter pain medicine, such as acetaminophen (Tylenol), ibuprofen (Advil, Motrin), or naproxen (Aleve). Read and follow all instructions on the label.  Be careful when taking over-the-counter cold or flu medicines and Tylenol at the same time. Many of these medicines have acetaminophen, which is Tylenol. Read the labels to make sure that you are not taking more than the recommended dose. Too much acetaminophen (Tylenol) can be harmful.  Drink plenty of fluids. Fluids may help soothe an irritated throat. Hot fluids, such as tea or soup, may help decrease throat pain.  Use over-the-counter throat lozenges to soothe pain. Regular cough drops or hard candy may also help. These should not be given to young children because of the risk of choking.  Do not smoke or allow others to smoke around you. If you need help quitting, talk to your doctor about stop-smoking programs and medicines. These can increase your chances of quitting for good.  Use a vaporizer or  "humidifier to add moisture to your bedroom. Follow the directions for cleaning the machine.  When should you call for help?   Call your doctor now or seek immediate medical care if:    You have trouble breathing.     Your sore throat gets much worse on one side.     You have new or worse trouble swallowing.     You have a new or higher fever.   Watch closely for changes in your health, and be sure to contact your doctor if you do not get better as expected.  Where can you learn more?  Go to https://www.[employer].Re-APP/patiented  Enter U420 in the search box to learn more about \"Sore Throat: Care Instructions.\"  Current as of: September 27, 2023  Content Version: 14.3    2024 [employer].   Care instructions adapted under license by your healthcare professional. If you have questions about a medical condition or this instruction, always ask your healthcare professional. [employer] disclaims any warranty or liability for your use of this information.    Strep Throat: Care Instructions  Overview     Strep throat is a bacterial infection that causes sudden, severe sore throat and fever. Symptoms may include red, swollen tonsils that may have white or yellow patches. A strep test may be needed to tell if the sore throat is caused by strep bacteria. Treatment can help ease symptoms and may prevent future problems.  Follow-up care is a key part of your treatment and safety. Be sure to make and go to all appointments, and call your doctor if you are having problems. It's also a good idea to know your test results and keep a list of the medicines you take.  How can you care for yourself at home?  Take your antibiotics as directed. Do not stop taking them just because you feel better. You need to take the full course of antibiotics.  Strep throat can spread to others until 24 hours after you begin taking antibiotics. During this time, avoid contact with other people at work, school, or home, especially " "infants and children. Do not sneeze or cough on others, and wash your hands often. Keep your drinking glass and eating utensils separate from those of others. Wash these items well in hot, soapy water.  Gargle with warm salt water several times a day to help reduce swelling and relieve pain. Mix 1/2 teaspoon of salt in 1 cup of warm water.  Take an over-the-counter pain medication, such as acetaminophen (Tylenol), ibuprofen (Advil, Motrin), or naproxen (Aleve). Read and follow all instructions on the label.  Try an over-the-counter anesthetic throat spray or throat lozenges, which may help relieve throat pain.  Drink plenty of fluids. Fluids may help soothe an irritated throat. Hot fluids, such as tea or soup, may help your throat feel better.  Eat soft solids and drink plenty of liquids. Flavored ice pops, ice cream, scrambled eggs, sherbet, and gelatin dessert (such as Jell-O) may also soothe the throat.  Get lots of rest.  If you smoke, try to quit. If you can't quit, cut back as much as you can. Smoking can interfere with healing. If you need help quitting, talk to your doctor about stop-smoking programs and medicines. These can increase your chances of quitting for good.  Use a vaporizer or humidifier to add moisture to the air where you sleep. Follow the directions for cleaning the machine.  When should you call for help?   Call your doctor now or seek immediate medical care if:    You have new or worse symptoms of infection, such as:  A new or higher fever.  A fever with a stiff neck or severe headache.  New or worse trouble swallowing.  Pain that becomes much worse on one side of your throat.   Watch closely for changes in your health, and be sure to contact your doctor if:    You are not getting better after 2 days (48 hours) after taking an antibiotic.   Where can you learn more?  Go to https://www.healthwise.net/patiented  Enter K625 in the search box to learn more about \"Strep Throat: Care " "Instructions.\"  Current as of: September 27, 2023  Content Version: 14.3    2024 [email or web address].   Care instructions adapted under license by your healthcare professional. If you have questions about a medical condition or this instruction, always ask your healthcare professional. [email or web address] disclaims any warranty or liability for your use of this information.    "

## 2025-02-26 ENCOUNTER — NURSE TRIAGE (OUTPATIENT)
Dept: FAMILY MEDICINE | Facility: CLINIC | Age: 35
End: 2025-02-26
Payer: COMMERCIAL

## 2025-02-26 LAB — SARS-COV-2 RNA RESP QL NAA+PROBE: NEGATIVE

## 2025-02-26 NOTE — TELEPHONE ENCOUNTER
Nurse Triage SBAR    Is this a 2nd Level Triage? NO    Situation: Patient calling to report update on symptoms following visit on 2/25, diagnosed with Strep.     Background: Patient started amoxicillin yesterday, 2/25 at about 2 pm. She has taken 3 doses so far.    Assessment: Patient reports tonsil appears larger and is more painful on left side. Also appears there is more pus on the left side. Patient reports pain is severe - she is able to eat and drink fluids. Tonsils do not appear to be touching. No difficultly breathing. Denies fever, states she had a very low grade fever last night.   Patient reports she took an oxycodone that she had from previous kidney stone for the pain, as ibuprofen was not providing enough relief.     Protocol Recommended Disposition:   Home Care    Recommendation: Advised patient to continue to monitor at this time and to call back in the morning if pain is still severe and symptoms are not improving. Advised to give the antibiotics at least 24 hours to provide relief. Advised to call back if symptoms are worsening in the meantime or to go to UC/ED with onset of trouble breathing, inability to swallow or fever of 103F.  Advised to continue ibuprofen as needed.     Does the patient meet one of the following criteria for ADS visit consideration? 16+ years old, with an [facility] PCP     TIP  Providers, please consider if this condition is appropriate for management at one of our Acute and Diagnostic Services sites.     If patient is a good candidate, please use dotphrase <dot>triageresponse and select Refer to ADS to document.    Reason for Disposition   [1] Taking antibiotic < 72 hours (3 days) for strep throat AND [2] sore throat not improved    Additional Information   Negative: SEVERE difficulty breathing (e.g., struggling for each breath, speaks in single words, stridor)   Negative: Sounds like a life-threatening emergency to the triager   Negative: [1] Drooling or spitting out saliva  "(because can't swallow) AND [2] new-onset   Negative: Unable to open mouth completely   Negative: [1] Difficulty breathing AND [2] not severe   Negative: [1] Fever > 103 F (39.4 C) AND [2] taking antibiotic > 24 hours   Negative: [1] Drinking very little AND [2] dehydration suspected (e.g., no urine > 12 hours, very dry mouth, very lightheaded)   Negative: [1] Refuses to drink anything AND [2] for > 12 hours   Negative: Patient sounds very sick or weak to the triager   Negative: [1] Taking antibiotic > 24 hours for strep throat AND [2] sore throat pain is SEVERE   Negative: [1] Taking antibiotic > 48 hours (2 days) for strep throat AND [2] fever persists   Negative: [1] Taking antibiotic > 72 hours (3 days) for strep throat AND [2] sore throat not improved   Negative: [1] Taking antibiotics < 48 hours for strep throat AND [2] fever persists    Answer Assessment - Initial Assessment Questions  1. SYMPTOM: \"What's the main symptom you're concerned about?\" (e.g., fever, difficulty swallowing, sore throat)      Pain and left tonsil is larger than right  2. ANTIBIOTIC: \"What antibiotic are you taking?\" \"How many times a day?\"      Amoxicillin   3. ONSET: \"When was the antibiotic started?\"      2/25 at 2 pm  4. THROAT PAIN:  \"How bad is the sore throat?\" (Scale 1-10; mild, moderate or severe)    - MILD (1-3):  Doesn't interfere with eating or normal activities.    - MODERATE (4-7): Interferes with eating some solids and normal activities.    - SEVERE (8-10):  Excruciating pain, interferes with most normal activities.    - SEVERE WITH DYSPHAGIA (10): Can't swallow liquids, drooling.      Severe.  5. FEVER: \"Do you have a fever?\" If Yes, ask: \"What is your temperature, how was it measured, and when did it start?\"      No  6. OTHER SYMPTOMS: \"Do you have any other symptoms?\" (e.g., rash)      Pus on left tonsil.  7. BETTER-SAME-WORSE: \"Are you getting better, staying the same, or getting worse compared to the day you started " "the antibiotics?\"      About the same, but concerned about left tonsil  8. PREGNANCY: \"Is there any chance you are pregnant?\" \"When was your last menstrual period?\"      DNA    Protocols used: Strep Throat Infection on Antibiotic Follow-up Call-A-AH    "

## 2025-02-26 NOTE — TELEPHONE ENCOUNTER
Reason for visit: Follow up - Kidney stones     Relevant information: Last seen 12/10/24 by Dr. Upton. Plan of care was to monitor her with renal ultrasound and KUB in approximately 3 months.    Records/imaging/labs/orders: IN [facility], CARE EVERYWHERE, AND PACS   XR AB - 2/14/25.   RASTA - Dec. 2024 - RN aware     At Rooming: Standard rooming    Gabby Mcgill  2/26/2025  9:38 AM

## 2025-03-11 ENCOUNTER — PRE VISIT (OUTPATIENT)
Dept: UROLOGY | Facility: CLINIC | Age: 35
End: 2025-03-11

## 2025-03-11 ENCOUNTER — VIRTUAL VISIT (OUTPATIENT)
Dept: UROLOGY | Facility: CLINIC | Age: 35
End: 2025-03-11
Payer: COMMERCIAL

## 2025-03-11 VITALS — WEIGHT: 245 LBS | HEIGHT: 70 IN | BODY MASS INDEX: 35.07 KG/M2

## 2025-03-11 DIAGNOSIS — N20.0 KIDNEY STONE: Primary | ICD-10-CM

## 2025-03-11 ASSESSMENT — PAIN SCALES - GENERAL: PAINLEVEL_OUTOF10: NO PAIN (0)

## 2025-03-11 NOTE — NURSING NOTE
Current patient location: [address] [place] [address]    Is the patient currently in the state of [place]? YES    Visit mode: VIDEO    If the visit is dropped, the patient can be reconnected by:VIDEO VISIT: Send to e-mail at: [email or web address]    Will anyone else be joining the visit? NO  (If patient encounters technical issues they should call [phone]    Are changes needed to the allergy or medication list? No    Are refills needed on medications prescribed by this physician? NO    Rooming Documentation:  Questionnaire(s) completed    Reason for visit: RECHECK    Guillermina TINOCOF

## 2025-03-11 NOTE — PROGRESS NOTES
Virtual Visit Details    Type of service:  Video Visit     Originating Location (pt. Location): Home    Distant Location (provider location):  Off-site  Platform used for Video Visit: [facility]          UROLOGY OUTPATIENT VISIT      Chief Complaint:   Left renal stone      Synopsis    Toma Blanco is a very pleasant AGE: 34 year old year old person    She has a history of recurrent stones.  At last visit was curious about updating imaging for known nonobstructing renal stones and also had concerns about UTI in the setting of cloudy urine.     KUB was updated and personally reviewed, shows a small nonobstructing stone in left lower pole.    Ucx grew Group B strep    She is asymptomatic at the moment    She does not takke medication or stone forming supplements         Medications     No current outpatient medications on file.     No current facility-administered medications for this visit.         The following  distinct labs were reviewed    I personally reviewed all applicable laboratory data and went over findings with patient  Significant for:    CBC RESULTS:  Recent Labs   Lab Test 01/15/25  1110 08/03/24  0629 08/02/24  0944 08/01/24  1517 07/08/24  1606 12/22/23  1801   WBC 6.2  --   --  11.6* 11.2* 11.2*   HGB 14.6 11.7  --  13.1 11.5* 12.3     --  270 289 296 360        BMP RESULTS:  Recent Labs   Lab Test 01/15/25  1110 08/02/24  0944 07/08/24  1606 08/25/23  1942 08/14/23  1154     --  135 138 137   POTASSIUM 4.1  --  4.2 4.0 3.8   CHLORIDE 103  --  103 106 108*   CO2 25  --  23 21* 21*   ANIONGAP 10  --  9 11 8   GLC 93  --  105* 89 90   BUN 11.9  --  6.3 8 9   CR 0.74 0.55 0.61 0.65 0.65   GFRESTIMATED >90 >90 >90 >90 >90       CALCIUM RESULTS:  Recent Labs   Lab Test 01/15/25  1110 07/08/24  1606 08/25/23  1942 08/14/23  1154   TAURUS 9.6 9.5 9.2 9.2       HGB A1C RESULTS:  Lab Results   Component Value Date    A1C 5.4 01/15/2025    A1C 5.8 10/10/2024       UA RESULTS:   Recent Labs   Lab  Test 01/15/25  1110 12/20/24  0939 08/21/23  0857 08/14/23  1215   SG 1.015 1.020 1.015 1.009   URINEPH 6.5 7.0 6.5 6.5   NITRITE Negative Negative Negative Negative   RBCU 0-2 None Seen  --  <1   WBCU None Seen 5-10*  --  1       Recent Imaging Report    I personally reviewed all applicable imaging and went over the below findings with patient.    Results for orders placed or performed during the hospital encounter of 02/14/25   XR Abdomen 2 Views    Narrative    EXAM: XR ABDOMEN 2 VIEWS  LOCATION: [facility]  DATE: 2/14/2025    INDICATION: Nephrolithiasis.  COMPARISON: CT abdomen/pelvis 8/6/2024.      Impression    IMPRESSION: No intraperitoneal free air. Nonspecific bowel gas pattern, due to a paucity of small bowel gas. Mild diffuse colonic stool burden. There is a 3 to 4 mm stone overlying the inferior pole left kidney, similar to previous CT imaging.            Assessment/Plan   34 year old year old person with left renal stone, small nonobstructing  1) Left renal stone.  Discussed management options including observation and intervention.  Shared decision made to closely monitor, update imaging in one year  2) Cloudy urine - unclear cause.  Offered updated 24 hour urine to check supersaturations, shared decision to focus on metabolic efforts including higher fluid intake and lower salt/oxalate    CC:  Alesia Harris

## 2025-03-11 NOTE — LETTER
3/11/2025       RE: Toma Blanco  [address]  [place] [address]     Dear Colleague,    Thank you for referring your patient, Toma Blanco, to the [facility] [place] [facility]. Please see a copy of my visit note below.    Virtual Visit Details    Type of service:  Video Visit     Originating Location (pt. Location): Home    Distant Location (provider location):  Off-site  Platform used for Video Visit: [facility]          UROLOGY OUTPATIENT VISIT      Chief Complaint:   Left renal stone      Synopsis    Toma Blanco is a very pleasant AGE: 34 year old year old person    She has a history of recurrent stones.  At last visit was curious about updating imaging for known nonobstructing renal stones and also had concerns about UTI in the setting of cloudy urine.     KUB was updated and personally reviewed, shows a small nonobstructing stone in left lower pole.    Ucx grew Group B strep    She is asymptomatic at the moment    She does not takke medication or stone forming supplements         Medications     No current outpatient medications on file.     No current facility-administered medications for this visit.         The following  distinct labs were reviewed    I personally reviewed all applicable laboratory data and went over findings with patient  Significant for:    CBC RESULTS:  Recent Labs   Lab Test 01/15/25  1110 08/03/24  0629 08/02/24  0944 08/01/24  1517 07/08/24  1606 12/22/23  1801   WBC 6.2  --   --  11.6* 11.2* 11.2*   HGB 14.6 11.7  --  13.1 11.5* 12.3     --  270 289 296 360        BMP RESULTS:  Recent Labs   Lab Test 01/15/25  1110 08/02/24  0944 07/08/24  1606 08/25/23  1942 08/14/23  1154     --  135 138 137   POTASSIUM 4.1  --  4.2 4.0 3.8   CHLORIDE 103  --  103 106 108*   CO2 25 --  23 21* 21*   ANIONGAP 10  --  9 11 8   GLC 93  --  105* 89 90   BUN 11.9  --  6.3 8 9   CR 0.74 0.55 0.61  0.65 0.65   GFRESTIMATED >90 >90 >90 >90 >90       CALCIUM RESULTS:  Recent Labs   Lab Test 01/15/25  1110 07/08/24  1606 08/25/23  1942 08/14/23  1154   TAURUS 9.6 9.5 9.2 9.2       HGB A1C RESULTS:  Lab Results   Component Value Date    A1C 5.4 01/15/2025    A1C 5.8 10/10/2024       UA RESULTS:   Recent Labs   Lab Test 01/15/25  1110 12/20/24  0939 08/21/23  0857 08/14/23  1215   SG 1.015 1.020 1.015 1.009   URINEPH 6.5 7.0 6.5 6.5   NITRITE Negative Negative Negative Negative   RBCU 0-2 None Seen  --  <1   WBCU None Seen 5-10*  --  1       Recent Imaging Report    I personally reviewed all applicable imaging and went over the below findings with patient.    Results for orders placed or performed during the hospital encounter of 02/14/25   XR Abdomen 2 Views    Narrative    EXAM: XR ABDOMEN 2 VIEWS  LOCATION: [facility]  DATE: 2/14/2025    INDICATION: Nephrolithiasis.  COMPARISON: CT abdomen/pelvis 8/6/2024.      Impression    IMPRESSION: No intraperitoneal free air. Nonspecific bowel gas pattern, due to a paucity of small bowel gas. Mild diffuse colonic stool burden. There is a 3 to 4 mm stone overlying the inferior pole left kidney, similar to previous CT imaging.            Assessment/Plan   34 year old year old person with left renal stone, small nonobstructing  1) Left renal stone.  Discussed management options including observation and intervention.  Shared decision made to closely monitor, update imaging in one year  2) Cloudy urine - unclear cause.  Offered updated 24 hour urine to check supersaturations, shared decision to focus on metabolic efforts including higher fluid intake and lower salt/oxalate    CC:  Alesia Harris      Again, thank you for allowing me to participate in the care of your patient.      Sincerely,    Benja Upton MD

## 2025-06-17 ENCOUNTER — TELEPHONE (OUTPATIENT)
Dept: HEMATOLOGY | Facility: CLINIC | Age: 35
End: 2025-06-17
Payer: COMMERCIAL

## 2025-06-17 DIAGNOSIS — Z86.718 PERSONAL HISTORY OF DVT (DEEP VEIN THROMBOSIS): Primary | ICD-10-CM

## 2025-06-17 RX ORDER — ENOXAPARIN SODIUM 100 MG/ML
40 INJECTION SUBCUTANEOUS EVERY 24 HOURS
Qty: 12 ML | Refills: 9 | Status: SHIPPED | OUTPATIENT
Start: 2025-06-17

## 2025-06-17 NOTE — CONFIDENTIAL NOTE
[id number]  Toma Blanco  34 year old female  [facility] Diagnosis: postpartum DVT and superficial thrombophlebitis  [facility] Provider: REYNA Flanagan    Incoming call from Toma who states she had a positive pregnancy test this morning. She thinks she is almost 4 weeks pregnant. She has left over Lovenox on hand from her piror pregnancy but will need a refill. Chart review confirms that Toma was on Lovenox 40mg every 24 hours during her last pregnancy. She did NOT switch to heparin around delivery, though this was a possibility.    Oumar LIMON RN  [facility]  Office: [phone]  Clinic: [phone]  Fax: [phone]

## 2025-06-17 NOTE — CONFIDENTIAL NOTE
Called Toma and left a VM letting her know that a refill for Lovenox 40mg was sent to [facility] pharmacy. Confirmed that she should take this once/day and follow up with REYNA Flanagan at the 30-32 week see. Provided her with the  number to schedule this appt.    Oumar LIMON RN  [facility]  Office: [phone]  Clinic: [phone]  Fax: [phone]

## 2025-06-18 ENCOUNTER — TELEPHONE (OUTPATIENT)
Dept: HEMATOLOGY | Facility: CLINIC | Age: 35
End: 2025-06-18
Payer: COMMERCIAL

## 2025-06-18 NOTE — TELEPHONE ENCOUNTER
Called from in basket to schedule a 30 min return CLOT in person (or virtual) with Magdalena around middle to end of December    (M6) obeys commands

## 2025-07-09 ENCOUNTER — HOSPITAL ENCOUNTER (OUTPATIENT)
Dept: ULTRASOUND IMAGING | Facility: HOSPITAL | Age: 35
Discharge: HOME | End: 2025-07-09
Attending: OBSTETRICS & GYNECOLOGY | Admitting: RADIOLOGY
Payer: COMMERCIAL

## 2025-07-09 DIAGNOSIS — O20.9 VAGINAL BLEEDING AFFECTING EARLY PREGNANCY: ICD-10-CM

## 2025-07-09 LAB — RADIOLOGIST FLAGS: ABNORMAL

## 2025-07-09 PROCEDURE — 76801 OB US < 14 WKS SINGLE FETUS: CPT

## 2025-07-17 ENCOUNTER — TRANSFERRED RECORDS (OUTPATIENT)
Dept: HEALTH INFORMATION MANAGEMENT | Facility: CLINIC | Age: 35
End: 2025-07-17
Payer: COMMERCIAL

## 2025-07-17 ENCOUNTER — MEDICAL CORRESPONDENCE (OUTPATIENT)
Dept: HEALTH INFORMATION MANAGEMENT | Facility: CLINIC | Age: 35
End: 2025-07-17
Payer: COMMERCIAL

## 2025-07-22 ENCOUNTER — TRANSCRIBE ORDERS (OUTPATIENT)
Dept: MATERNAL FETAL MEDICINE | Facility: CLINIC | Age: 35
End: 2025-07-22
Payer: COMMERCIAL

## 2025-07-22 DIAGNOSIS — O26.90 PREGNANCY RELATED CONDITION, ANTEPARTUM: Primary | ICD-10-CM

## 2025-08-12 ENCOUNTER — PRE VISIT (OUTPATIENT)
Dept: MATERNAL FETAL MEDICINE | Facility: CLINIC | Age: 35
End: 2025-08-12
Payer: COMMERCIAL

## 2025-08-15 ENCOUNTER — HOSPITAL ENCOUNTER (OUTPATIENT)
Dept: ULTRASOUND IMAGING | Facility: CLINIC | Age: 35
Discharge: HOME | End: 2025-08-15
Attending: OBSTETRICS & GYNECOLOGY
Payer: COMMERCIAL

## 2025-08-15 DIAGNOSIS — Z86.718 PERSONAL HISTORY OF DVT (DEEP VEIN THROMBOSIS): ICD-10-CM

## 2025-08-15 DIAGNOSIS — Z87.59 HISTORY OF MISCARRIAGE: ICD-10-CM

## 2025-08-15 PROCEDURE — 76801 OB US < 14 WKS SINGLE FETUS: CPT

## 2025-08-15 PROCEDURE — 76801 OB US < 14 WKS SINGLE FETUS: CPT | Mod: 26 | Performed by: STUDENT IN AN ORGANIZED HEALTH CARE EDUCATION/TRAINING PROGRAM

## (undated) DEVICE — STRAP KNEE/BODY 31143004

## (undated) DEVICE — SUCTION CANNULA UTERINE 08MM CVD 022108-10

## (undated) DEVICE — PREP DURAPREP 26ML APL 8630

## (undated) DEVICE — DRSG STERI STRIP 1/2X4" R1547

## (undated) DEVICE — SOL NACL 0.9% IRRIG 1000ML BOTTLE 2F7124

## (undated) DEVICE — Device

## (undated) DEVICE — ESU GROUND PAD UNIVERSAL W/O CORD

## (undated) DEVICE — GOWN XLG DISP 9545

## (undated) DEVICE — GLOVE BIOGEL PI MICRO INDICATOR UNDERGLOVE SZ 6.5 48965

## (undated) DEVICE — SOLIDIFIER (USE FOR UP TO 1500CC) MSOLID1500

## (undated) DEVICE — SUCTION VACUUM CANISTER STANDARD W/LID&CAPS 003987-901

## (undated) DEVICE — DECANTER TRANSFER DEVICE 2008S

## (undated) DEVICE — PACK LOWER EXTREMITY RIVERSIDE SOP32LEFSX

## (undated) DEVICE — GOWN IMPERVIOUS SPECIALTY XLG/XLONG 32474

## (undated) DEVICE — PAD CHUX UNDERPAD 30X36" P3036C

## (undated) DEVICE — DRSG TEGADERM 4X4 3/4" 1626W

## (undated) DEVICE — SU PDS II 3-0 PS-1 18" Z683G

## (undated) DEVICE — DRAPE STOCKINETTE 8" 8586

## (undated) DEVICE — SPECIMEN CONTAINER 5OZ STERILE 2600SA

## (undated) DEVICE — LINEN GOWN X4 5410

## (undated) DEVICE — TUBING SUCTION VACUUM COLLECTION 6FTX3/8" 022310

## (undated) DEVICE — GLOVE BIOGEL PI ULTRATOUCH G SZ 6.5 42165

## (undated) DEVICE — PREP SKIN SCRUB TRAY 4461A

## (undated) DEVICE — SOL WATER IRRIG 1000ML BOTTLE 2F7114

## (undated) DEVICE — LINEN TOWEL PACK X5 5464

## (undated) DEVICE — PREP DYNA-HEX 4% CHG SCRUB 4OZ BOTTLE MDS098710

## (undated) DEVICE — PREP POVIDONE-IODINE 7.5% SCRUB 4OZ BOTTLE MDS093945

## (undated) DEVICE — DRSG TEGADERM ALGINATE AG 4X5" 90303

## (undated) DEVICE — GLOVE BIOGEL PI MICRO INDICATOR UNDERGLOVE SZ 7.5 48975

## (undated) DEVICE — SPECIMEN TRAP VACUUM SUCTION SAFETOUCH 003853-902

## (undated) DEVICE — ESU CORD BIPOLAR GREEN 10-4000

## (undated) DEVICE — GLOVE BIOGEL PI MICRO SZ 6.0 48560

## (undated) DEVICE — DRSG TELFA 3X8" 1238

## (undated) DEVICE — TOURNIQUET CUFF 30" REPRO BLUE 60-7070-105

## (undated) DEVICE — SUCTION MANIFOLD NEPTUNE 2 SYS 4 PORT 0702-020-000

## (undated) DEVICE — GLOVE BIOGEL PI ULTRATOUCH SZ 7.0 41170

## (undated) DEVICE — LINEN GOWN OVERSIZE 5408

## (undated) DEVICE — DRAPE IOBAN INCISE 23X17" 6650EZ

## (undated) DEVICE — LINEN ORTHO PACK 5446

## (undated) RX ORDER — FENTANYL CITRATE 50 UG/ML
INJECTION, SOLUTION INTRAMUSCULAR; INTRAVENOUS
Status: DISPENSED
Start: 2023-08-30

## (undated) RX ORDER — PROPOFOL 10 MG/ML
INJECTION, EMULSION INTRAVENOUS
Status: DISPENSED
Start: 2023-08-30

## (undated) RX ORDER — LIDOCAINE HYDROCHLORIDE 10 MG/ML
INJECTION, SOLUTION EPIDURAL; INFILTRATION; INTRACAUDAL; PERINEURAL
Status: DISPENSED
Start: 2023-08-30

## (undated) RX ORDER — DEXAMETHASONE SODIUM PHOSPHATE 4 MG/ML
INJECTION, SOLUTION INTRA-ARTICULAR; INTRALESIONAL; INTRAMUSCULAR; INTRAVENOUS; SOFT TISSUE
Status: DISPENSED
Start: 2023-01-17

## (undated) RX ORDER — FENTANYL CITRATE-0.9 % NACL/PF 10 MCG/ML
PLASTIC BAG, INJECTION (ML) INTRAVENOUS
Status: DISPENSED
Start: 2023-08-30

## (undated) RX ORDER — ACETAMINOPHEN 325 MG/1
TABLET ORAL
Status: DISPENSED
Start: 2023-08-30

## (undated) RX ORDER — OXYCODONE HYDROCHLORIDE 5 MG/1
TABLET ORAL
Status: DISPENSED
Start: 2023-01-17

## (undated) RX ORDER — FENTANYL CITRATE 50 UG/ML
INJECTION, SOLUTION INTRAMUSCULAR; INTRAVENOUS
Status: DISPENSED
Start: 2023-06-02

## (undated) RX ORDER — FENTANYL CITRATE 50 UG/ML
INJECTION, SOLUTION INTRAMUSCULAR; INTRAVENOUS
Status: DISPENSED
Start: 2023-01-17

## (undated) RX ORDER — ONDANSETRON 2 MG/ML
INJECTION INTRAMUSCULAR; INTRAVENOUS
Status: DISPENSED
Start: 2023-01-17

## (undated) RX ORDER — PROPOFOL 10 MG/ML
INJECTION, EMULSION INTRAVENOUS
Status: DISPENSED
Start: 2023-01-17

## (undated) RX ORDER — KETOROLAC TROMETHAMINE 30 MG/ML
INJECTION, SOLUTION INTRAMUSCULAR; INTRAVENOUS
Status: DISPENSED
Start: 2023-08-30

## (undated) RX ORDER — ACETAMINOPHEN 325 MG/1
TABLET ORAL
Status: DISPENSED
Start: 2023-06-02

## (undated) RX ORDER — DOXYCYCLINE 100 MG/10ML
INJECTION, POWDER, LYOPHILIZED, FOR SOLUTION INTRAVENOUS
Status: DISPENSED
Start: 2023-08-30

## (undated) RX ORDER — DOXYCYCLINE 100 MG/10ML
INJECTION, POWDER, LYOPHILIZED, FOR SOLUTION INTRAVENOUS
Status: DISPENSED
Start: 2023-06-02

## (undated) RX ORDER — BUPIVACAINE HYDROCHLORIDE 2.5 MG/ML
INJECTION, SOLUTION EPIDURAL; INFILTRATION; INTRACAUDAL
Status: DISPENSED
Start: 2023-01-17

## (undated) RX ORDER — ACETAMINOPHEN 325 MG/1
TABLET ORAL
Status: DISPENSED
Start: 2023-01-17

## (undated) RX ORDER — LIDOCAINE HYDROCHLORIDE 10 MG/ML
INJECTION, SOLUTION EPIDURAL; INFILTRATION; INTRACAUDAL; PERINEURAL
Status: DISPENSED
Start: 2023-06-02

## (undated) RX ORDER — DEXAMETHASONE SODIUM PHOSPHATE 4 MG/ML
INJECTION, SOLUTION INTRA-ARTICULAR; INTRALESIONAL; INTRAMUSCULAR; INTRAVENOUS; SOFT TISSUE
Status: DISPENSED
Start: 2023-08-30

## (undated) RX ORDER — ONDANSETRON 2 MG/ML
INJECTION INTRAMUSCULAR; INTRAVENOUS
Status: DISPENSED
Start: 2023-08-30

## (undated) RX ORDER — CEFAZOLIN SODIUM/WATER 2 G/20 ML
SYRINGE (ML) INTRAVENOUS
Status: DISPENSED
Start: 2023-01-17